# Patient Record
Sex: MALE | Race: WHITE | Employment: OTHER | URBAN - METROPOLITAN AREA
[De-identification: names, ages, dates, MRNs, and addresses within clinical notes are randomized per-mention and may not be internally consistent; named-entity substitution may affect disease eponyms.]

---

## 2017-01-05 RX ORDER — PRAVASTATIN SODIUM 40 MG
TABLET ORAL
Qty: 90 TABLET | Refills: 2 | Status: SHIPPED | OUTPATIENT
Start: 2017-01-05 | End: 2017-06-16 | Stop reason: SDUPTHER

## 2017-01-05 RX ORDER — LISINOPRIL AND HYDROCHLOROTHIAZIDE 25; 20 MG/1; MG/1
TABLET ORAL
Qty: 90 TABLET | Refills: 0 | Status: SHIPPED | OUTPATIENT
Start: 2017-01-05 | End: 2017-04-15 | Stop reason: SDUPTHER

## 2017-04-13 ENCOUNTER — OFFICE VISIT (OUTPATIENT)
Dept: INTERNAL MEDICINE CLINIC | Age: 82
End: 2017-04-13

## 2017-04-13 VITALS — DIASTOLIC BLOOD PRESSURE: 60 MMHG | HEIGHT: 68 IN | SYSTOLIC BLOOD PRESSURE: 130 MMHG

## 2017-04-13 DIAGNOSIS — R41.3 MEMORY CHANGE: ICD-10-CM

## 2017-04-13 DIAGNOSIS — H61.23 IMPACTED CERUMEN OF BOTH EARS: Primary | ICD-10-CM

## 2017-04-13 PROCEDURE — 99212 OFFICE O/P EST SF 10 MIN: CPT | Performed by: NURSE PRACTITIONER

## 2017-04-17 RX ORDER — LISINOPRIL AND HYDROCHLOROTHIAZIDE 25; 20 MG/1; MG/1
TABLET ORAL
Qty: 90 TABLET | Refills: 0 | Status: SHIPPED | OUTPATIENT
Start: 2017-04-17 | End: 2017-06-16 | Stop reason: SDUPTHER

## 2017-04-25 ENCOUNTER — OFFICE VISIT (OUTPATIENT)
Dept: INTERNAL MEDICINE CLINIC | Age: 82
End: 2017-04-25

## 2017-04-25 VITALS
DIASTOLIC BLOOD PRESSURE: 70 MMHG | BODY MASS INDEX: 23.64 KG/M2 | HEIGHT: 68 IN | SYSTOLIC BLOOD PRESSURE: 130 MMHG | WEIGHT: 156 LBS

## 2017-04-25 DIAGNOSIS — E11.9 TYPE 2 DIABETES MELLITUS WITHOUT COMPLICATION, WITHOUT LONG-TERM CURRENT USE OF INSULIN (HCC): ICD-10-CM

## 2017-04-25 DIAGNOSIS — R41.3 MEMORY CHANGES: Primary | ICD-10-CM

## 2017-04-25 DIAGNOSIS — I10 ESSENTIAL HYPERTENSION: ICD-10-CM

## 2017-04-25 DIAGNOSIS — H61.21 IMPACTED CERUMEN OF RIGHT EAR: ICD-10-CM

## 2017-04-25 PROCEDURE — 99214 OFFICE O/P EST MOD 30 MIN: CPT | Performed by: NURSE PRACTITIONER

## 2017-04-25 ASSESSMENT — ENCOUNTER SYMPTOMS
CONSTIPATION: 0
COUGH: 0
BACK PAIN: 0
COLOR CHANGE: 0
DIARRHEA: 0
SHORTNESS OF BREATH: 0
BLOOD IN STOOL: 0
EYES NEGATIVE: 1

## 2017-05-31 ENCOUNTER — HOSPITAL ENCOUNTER (OUTPATIENT)
Dept: GENERAL RADIOLOGY | Age: 82
Discharge: HOME | End: 2017-05-31
Attending: PSYCHIATRY & NEUROLOGY | Admitting: PSYCHIATRY & NEUROLOGY

## 2017-05-31 LAB
ANTI-THYROGLOB ABS: 12 IU/ML
FOLATE: >20 NG/ML (ref 4.78–24.2)
HOMOCYSTEINE: 8 UMOL/L (ref 0–10)
T4 TOTAL: 7.5 UG/DL (ref 4.5–10.9)
TSH SERPL DL<=0.05 MIU/L-ACNC: 1.12 UIU/ML (ref 0.27–4.2)

## 2017-06-01 LAB
RPR: NORMAL
THYROID PEROXIDASE (TPO) ABS: 13 IU/ML

## 2017-06-01 RX ORDER — ALENDRONATE SODIUM 70 MG/1
TABLET ORAL
Qty: 4 TABLET | Refills: 9 | Status: SHIPPED | OUTPATIENT
Start: 2017-06-01 | End: 2018-03-27 | Stop reason: SDUPTHER

## 2017-06-08 ENCOUNTER — HOSPITAL ENCOUNTER (OUTPATIENT)
Dept: NEUROLOGY | Age: 82
Discharge: HOME | End: 2017-06-08
Attending: PSYCHIATRY & NEUROLOGY | Admitting: PSYCHIATRY & NEUROLOGY

## 2017-06-08 DIAGNOSIS — R41.3 OTHER AMNESIA: ICD-10-CM

## 2017-06-08 PROCEDURE — 95816 EEG AWAKE AND DROWSY: CPT | Performed by: PSYCHIATRY & NEUROLOGY

## 2017-06-14 ENCOUNTER — TELEPHONE (OUTPATIENT)
Dept: INTERNAL MEDICINE CLINIC | Age: 82
End: 2017-06-14

## 2017-06-16 ENCOUNTER — HOSPITAL ENCOUNTER (OUTPATIENT)
Dept: GENERAL RADIOLOGY | Age: 82
Discharge: HOME | End: 2017-06-16
Attending: NURSE PRACTITIONER | Admitting: NURSE PRACTITIONER

## 2017-06-16 ENCOUNTER — OFFICE VISIT (OUTPATIENT)
Dept: INTERNAL MEDICINE CLINIC | Age: 82
End: 2017-06-16

## 2017-06-16 VITALS
BODY MASS INDEX: 23.72 KG/M2 | WEIGHT: 156 LBS | HEART RATE: 92 BPM | DIASTOLIC BLOOD PRESSURE: 76 MMHG | SYSTOLIC BLOOD PRESSURE: 132 MMHG | OXYGEN SATURATION: 98 %

## 2017-06-16 DIAGNOSIS — I44.7 LBBB (LEFT BUNDLE BRANCH BLOCK): ICD-10-CM

## 2017-06-16 DIAGNOSIS — E11.9 TYPE 2 DIABETES MELLITUS WITHOUT COMPLICATION, WITHOUT LONG-TERM CURRENT USE OF INSULIN (HCC): Primary | ICD-10-CM

## 2017-06-16 DIAGNOSIS — I10 ESSENTIAL HYPERTENSION: ICD-10-CM

## 2017-06-16 DIAGNOSIS — E78.2 MIXED HYPERLIPIDEMIA: ICD-10-CM

## 2017-06-16 DIAGNOSIS — E11.9 TYPE 2 DIABETES MELLITUS WITHOUT COMPLICATION, WITHOUT LONG-TERM CURRENT USE OF INSULIN (HCC): ICD-10-CM

## 2017-06-16 LAB
A/G RATIO: 1.2 (ref 1.1–2.2)
ALBUMIN SERPL-MCNC: 4.4 G/DL (ref 3.4–5)
ALP BLD-CCNC: 62 U/L (ref 40–129)
ALT SERPL-CCNC: 9 U/L (ref 10–40)
ANION GAP SERPL CALCULATED.3IONS-SCNC: 13 MMOL/L (ref 3–16)
AST SERPL-CCNC: 21 U/L (ref 15–37)
BILIRUB SERPL-MCNC: 0.7 MG/DL (ref 0–1)
BUN BLDV-MCNC: 17 MG/DL (ref 7–20)
CALCIUM SERPL-MCNC: 9.5 MG/DL (ref 8.3–10.6)
CHLORIDE BLD-SCNC: 97 MMOL/L (ref 99–110)
CHOLESTEROL, TOTAL: 139 MG/DL (ref 0–199)
CO2: 29 MMOL/L (ref 21–32)
CREAT SERPL-MCNC: 0.6 MG/DL (ref 0.8–1.3)
ESTIMATED AVERAGE GLUCOSE: 157.1 MG/DL
GFR AFRICAN AMERICAN: >60
GFR NON-AFRICAN AMERICAN: >60
GLOBULIN: 3.6 G/DL
GLUCOSE BLD-MCNC: 121 MG/DL (ref 70–99)
HBA1C MFR BLD: 0 %
HBA1C MFR BLD: 7.1 %
HDLC SERPL-MCNC: 59 MG/DL (ref 40–60)
LDL CHOLESTEROL CALCULATED: 67 MG/DL
POTASSIUM SERPL-SCNC: 4.1 MMOL/L (ref 3.5–5.1)
SODIUM BLD-SCNC: 139 MMOL/L (ref 136–145)
TOTAL PROTEIN: 8 G/DL (ref 6.4–8.2)
TRIGL SERPL-MCNC: 66 MG/DL (ref 0–150)
VLDLC SERPL CALC-MCNC: 13 MG/DL

## 2017-06-16 PROCEDURE — 99214 OFFICE O/P EST MOD 30 MIN: CPT | Performed by: NURSE PRACTITIONER

## 2017-06-16 PROCEDURE — 83036 HEMOGLOBIN GLYCOSYLATED A1C: CPT | Performed by: NURSE PRACTITIONER

## 2017-06-16 RX ORDER — PRAVASTATIN SODIUM 40 MG
TABLET ORAL
Qty: 90 TABLET | Refills: 3 | Status: SHIPPED | OUTPATIENT
Start: 2017-06-16 | End: 2018-08-18 | Stop reason: SDUPTHER

## 2017-06-16 RX ORDER — LISINOPRIL AND HYDROCHLOROTHIAZIDE 25; 20 MG/1; MG/1
TABLET ORAL
Qty: 90 TABLET | Refills: 3 | Status: SHIPPED | OUTPATIENT
Start: 2017-06-16 | End: 2018-09-12 | Stop reason: SDUPTHER

## 2017-06-16 ASSESSMENT — ENCOUNTER SYMPTOMS
FACIAL SWELLING: 0
NAUSEA: 0
COUGH: 0
SINUS PRESSURE: 0
VOMITING: 0
SORE THROAT: 0
DIARRHEA: 0

## 2017-06-16 ASSESSMENT — PATIENT HEALTH QUESTIONNAIRE - PHQ9
SUM OF ALL RESPONSES TO PHQ9 QUESTIONS 1 & 2: 2
1. LITTLE INTEREST OR PLEASURE IN DOING THINGS: 1
SUM OF ALL RESPONSES TO PHQ QUESTIONS 1-9: 2
2. FEELING DOWN, DEPRESSED OR HOPELESS: 1

## 2017-07-03 ENCOUNTER — TELEPHONE (OUTPATIENT)
Dept: INTERNAL MEDICINE CLINIC | Age: 82
End: 2017-07-03

## 2017-07-31 ENCOUNTER — TELEPHONE (OUTPATIENT)
Dept: INTERNAL MEDICINE CLINIC | Age: 82
End: 2017-07-31

## 2017-08-01 ENCOUNTER — OFFICE VISIT (OUTPATIENT)
Dept: INTERNAL MEDICINE CLINIC | Age: 82
End: 2017-08-01

## 2017-08-01 VITALS
TEMPERATURE: 98.8 F | WEIGHT: 154 LBS | SYSTOLIC BLOOD PRESSURE: 124 MMHG | HEART RATE: 53 BPM | HEIGHT: 68 IN | DIASTOLIC BLOOD PRESSURE: 62 MMHG | BODY MASS INDEX: 23.34 KG/M2 | OXYGEN SATURATION: 95 %

## 2017-08-01 DIAGNOSIS — J04.0 LARYNGITIS: ICD-10-CM

## 2017-08-01 DIAGNOSIS — J06.9 UPPER RESPIRATORY TRACT INFECTION, UNSPECIFIED TYPE: Primary | ICD-10-CM

## 2017-08-01 PROCEDURE — 99213 OFFICE O/P EST LOW 20 MIN: CPT | Performed by: INTERNAL MEDICINE

## 2017-08-01 PROCEDURE — 4010F ACE/ARB THERAPY RXD/TAKEN: CPT | Performed by: INTERNAL MEDICINE

## 2017-10-04 ENCOUNTER — HOSPITAL ENCOUNTER (OUTPATIENT)
Dept: GENERAL RADIOLOGY | Age: 82
Discharge: HOME | End: 2017-10-04
Attending: PSYCHIATRY & NEUROLOGY | Admitting: PSYCHIATRY & NEUROLOGY

## 2017-10-04 LAB — VITAMIN B-12: 629 PG/ML (ref 211–911)

## 2017-10-05 ENCOUNTER — TELEPHONE (OUTPATIENT)
Dept: INTERNAL MEDICINE CLINIC | Age: 82
End: 2017-10-05

## 2017-11-08 ENCOUNTER — OFFICE VISIT (OUTPATIENT)
Dept: INTERNAL MEDICINE CLINIC | Age: 82
End: 2017-11-08

## 2017-11-08 ENCOUNTER — HOSPITAL ENCOUNTER (OUTPATIENT)
Dept: CT IMAGING | Age: 82
Discharge: HOME | End: 2017-11-08
Attending: FAMILY MEDICINE | Admitting: FAMILY MEDICINE

## 2017-11-08 ENCOUNTER — TELEPHONE (OUTPATIENT)
Dept: INTERNAL MEDICINE CLINIC | Age: 82
End: 2017-11-08

## 2017-11-08 VITALS
SYSTOLIC BLOOD PRESSURE: 138 MMHG | DIASTOLIC BLOOD PRESSURE: 82 MMHG | WEIGHT: 145 LBS | OXYGEN SATURATION: 96 % | HEART RATE: 62 BPM | BODY MASS INDEX: 22.05 KG/M2

## 2017-11-08 DIAGNOSIS — R59.0 LOCALIZED ENLARGED LYMPH NODES: ICD-10-CM

## 2017-11-08 DIAGNOSIS — R59.0 LYMPHADENOPATHY OF RIGHT CERVICAL REGION: ICD-10-CM

## 2017-11-08 DIAGNOSIS — E11.9 TYPE 2 DIABETES MELLITUS WITHOUT COMPLICATION, WITHOUT LONG-TERM CURRENT USE OF INSULIN (HCC): ICD-10-CM

## 2017-11-08 DIAGNOSIS — Z12.5 SCREENING FOR PROSTATE CANCER: Primary | ICD-10-CM

## 2017-11-08 DIAGNOSIS — R59.0 LYMPHADENOPATHY OF RIGHT CERVICAL REGION: Primary | ICD-10-CM

## 2017-11-08 DIAGNOSIS — R63.4 WEIGHT LOSS: ICD-10-CM

## 2017-11-08 LAB
A/G RATIO: 1.4 (ref 1.1–2.2)
ALBUMIN SERPL-MCNC: 4.2 G/DL (ref 3.4–5)
ALP BLD-CCNC: 60 U/L (ref 40–129)
ALT SERPL-CCNC: 17 U/L (ref 10–40)
ANION GAP SERPL CALCULATED.3IONS-SCNC: 12 MMOL/L (ref 3–16)
AST SERPL-CCNC: 19 U/L (ref 15–37)
BASOPHILS ABSOLUTE: 0 K/UL (ref 0–0.2)
BASOPHILS RELATIVE PERCENT: 0.8 %
BILIRUB SERPL-MCNC: 0.6 MG/DL (ref 0–1)
BUN BLDV-MCNC: 28 MG/DL (ref 7–20)
CALCIUM SERPL-MCNC: 9.9 MG/DL (ref 8.3–10.6)
CHLORIDE BLD-SCNC: 100 MMOL/L (ref 99–110)
CO2: 31 MMOL/L (ref 21–32)
CREAT SERPL-MCNC: 0.6 MG/DL (ref 0.8–1.3)
CREAT SERPL-MCNC: 0.7 MG/DL (ref 0.8–1.3)
EOSINOPHILS ABSOLUTE: 0 K/UL (ref 0–0.6)
EOSINOPHILS RELATIVE PERCENT: 0.9 %
GFR AFRICAN AMERICAN: >60
GFR AFRICAN AMERICAN: >60
GFR NON-AFRICAN AMERICAN: >60
GFR NON-AFRICAN AMERICAN: >60
GLOBULIN: 3.1 G/DL
GLUCOSE BLD-MCNC: 108 MG/DL (ref 70–99)
HCT VFR BLD CALC: 40.9 % (ref 40.5–52.5)
HEMOGLOBIN: 13.7 G/DL (ref 13.5–17.5)
LYMPHOCYTES ABSOLUTE: 0.8 K/UL (ref 1–5.1)
LYMPHOCYTES RELATIVE PERCENT: 21.6 %
MCH RBC QN AUTO: 31.9 PG (ref 26–34)
MCHC RBC AUTO-ENTMCNC: 33.4 G/DL (ref 31–36)
MCV RBC AUTO: 95.5 FL (ref 80–100)
MONOCYTES ABSOLUTE: 0.3 K/UL (ref 0–1.3)
MONOCYTES RELATIVE PERCENT: 9.1 %
NEUTROPHILS ABSOLUTE: 2.6 K/UL (ref 1.7–7.7)
NEUTROPHILS RELATIVE PERCENT: 67.6 %
PDW BLD-RTO: 13.7 % (ref 12.4–15.4)
PLATELET # BLD: 171 K/UL (ref 135–450)
PMV BLD AUTO: 10.8 FL (ref 5–10.5)
POTASSIUM SERPL-SCNC: 5 MMOL/L (ref 3.5–5.1)
RBC # BLD: 4.28 M/UL (ref 4.2–5.9)
SODIUM BLD-SCNC: 143 MMOL/L (ref 136–145)
TOTAL PROTEIN: 7.3 G/DL (ref 6.4–8.2)
WBC # BLD: 3.8 K/UL (ref 4–11)

## 2017-11-08 PROCEDURE — 1036F TOBACCO NON-USER: CPT | Performed by: FAMILY MEDICINE

## 2017-11-08 PROCEDURE — 4040F PNEUMOC VAC/ADMIN/RCVD: CPT | Performed by: FAMILY MEDICINE

## 2017-11-08 PROCEDURE — G8420 CALC BMI NORM PARAMETERS: HCPCS | Performed by: FAMILY MEDICINE

## 2017-11-08 PROCEDURE — 1123F ACP DISCUSS/DSCN MKR DOCD: CPT | Performed by: FAMILY MEDICINE

## 2017-11-08 PROCEDURE — G8427 DOCREV CUR MEDS BY ELIG CLIN: HCPCS | Performed by: FAMILY MEDICINE

## 2017-11-08 PROCEDURE — 99214 OFFICE O/P EST MOD 30 MIN: CPT | Performed by: FAMILY MEDICINE

## 2017-11-08 PROCEDURE — G8484 FLU IMMUNIZE NO ADMIN: HCPCS | Performed by: FAMILY MEDICINE

## 2017-11-08 ASSESSMENT — ENCOUNTER SYMPTOMS
STRIDOR: 0
TROUBLE SWALLOWING: 0
BACK PAIN: 0
SORE THROAT: 0
COUGH: 0
NAUSEA: 0
SHORTNESS OF BREATH: 0
ABDOMINAL PAIN: 0
DIARRHEA: 0
VOMITING: 0

## 2017-11-08 NOTE — TELEPHONE ENCOUNTER
I will call Pt tomorrow when all results are available    Neck CT showed reactive lymphadenopathy, ?  Reactive right submandibular gland, nonspecific sclerotic lesions in C7, T1 and T2

## 2017-11-08 NOTE — PROGRESS NOTES
Neck CT showed reactive lymphadenopathy, ?  Reactive right submandibular gland, nonspecific sclerotic lesions in C7, T1 and T2

## 2017-11-09 DIAGNOSIS — Z12.5 SCREENING FOR PROSTATE CANCER: ICD-10-CM

## 2017-11-09 LAB
ESTIMATED AVERAGE GLUCOSE: 159.9 MG/DL
HBA1C MFR BLD: 7.2 %
PROSTATE SPECIFIC ANTIGEN: 1.76 NG/ML (ref 0–4)

## 2017-11-09 NOTE — PROGRESS NOTES
CBC stable, no anemia   CMP ok except BUN slightly up - rec drink more water  A1c stable and acceptable at 7. 2
normal.   Left Ear: External ear normal.   Nose: Nose normal.   Edentulous on top, dentures in place, poor dentition on the bottom, no oral masses   Eyes: Conjunctivae are normal.   Cardiovascular: Normal rate, regular rhythm and normal heart sounds. Pulmonary/Chest: Effort normal and breath sounds normal.   Abdominal: Soft. Bowel sounds are normal.   Musculoskeletal:   Kyphotic spine   Lymphadenopathy:     He has cervical adenopathy (Palpable, mildly tender mass along superior anterior cervical chain). Right axillary: No pectoral and no lateral adenopathy present. Left axillary: No pectoral and no lateral adenopathy present. Right: No inguinal and no supraclavicular adenopathy present. Left: Inguinal adenopathy present. No supraclavicular adenopathy present. Neurological: He is alert and oriented to person, place, and time. Skin: He is not diaphoretic. Psychiatric: He has a normal mood and affect. His behavior is normal. Thought content normal.   Vitals reviewed. Vitals:    11/08/17 1152   BP: 138/82   Site: Right Arm   Position: Sitting   Cuff Size: Medium Adult   Pulse: 62   SpO2: 96%   Weight: 145 lb (65.8 kg)     Body mass index is 22.05 kg/m². Assessment/Plan:    1. Lymphadenopathy of right cervical region  2. Weight loss  -Concern for lymphadenopathy, possible lymphoma given lack of infectious symptoms and weight loss  Sent patient for CT scan of his neck  Will also check bloodwork    - Comprehensive Metabolic Panel; Future  - CBC Auto Differential; Future  - CREATININE, SERUM; Future  - CT Soft Tissue Neck W Contrast; Future        3. Type 2 diabetes mellitus without complication, without long-term current use of insulin (HCC)  Diet controlled  Will check hemoglobin A1c with blood work, as he will be due for that soon  - Hemoglobin A1C      Return if symptoms worsen or fail to improve.

## 2017-11-09 NOTE — TELEPHONE ENCOUNTER
I called Pt back to discuss CT and lab results:    CBC stable, no anemia   CMP ok except BUN slightly up - rec drink more water  A1c stable and acceptable at 7.2    He does not c/o neck pain. He cannot recall if he has had problems with his prostate in the past. I rec adding on a PSA level to yesterday's bloodwork, as this could be a possible primary CA if bone lesions are metastatic. We discussed considering further workup with a bone scan. He would like to discuss this with his daughters and let us know. I offered to call one of his children as well to discuss, but he declined. He has a daughter in town with him right now and will discuss it. He has a follow-up appointment with his PCP Yuan next month. I told him I would let her know about these results as well.

## 2017-12-18 ENCOUNTER — OFFICE VISIT (OUTPATIENT)
Dept: INTERNAL MEDICINE CLINIC | Age: 82
End: 2017-12-18

## 2017-12-18 ENCOUNTER — TELEPHONE (OUTPATIENT)
Dept: INTERNAL MEDICINE CLINIC | Age: 82
End: 2017-12-18

## 2017-12-18 VITALS
WEIGHT: 145 LBS | DIASTOLIC BLOOD PRESSURE: 84 MMHG | BODY MASS INDEX: 22.05 KG/M2 | OXYGEN SATURATION: 97 % | HEART RATE: 81 BPM | SYSTOLIC BLOOD PRESSURE: 136 MMHG

## 2017-12-18 DIAGNOSIS — I10 ESSENTIAL HYPERTENSION: ICD-10-CM

## 2017-12-18 DIAGNOSIS — E78.2 MIXED HYPERLIPIDEMIA: ICD-10-CM

## 2017-12-18 DIAGNOSIS — R93.89 ABNORMAL CT SCAN, NECK: ICD-10-CM

## 2017-12-18 DIAGNOSIS — E11.9 TYPE 2 DIABETES MELLITUS WITHOUT COMPLICATION, WITHOUT LONG-TERM CURRENT USE OF INSULIN (HCC): Primary | ICD-10-CM

## 2017-12-18 DIAGNOSIS — R41.3 MEMORY LOSS: ICD-10-CM

## 2017-12-18 DIAGNOSIS — N88.9 CERVICAL LESION: ICD-10-CM

## 2017-12-18 DIAGNOSIS — R35.0 URINARY FREQUENCY: ICD-10-CM

## 2017-12-18 DIAGNOSIS — Z23 NEED FOR INFLUENZA VACCINATION: ICD-10-CM

## 2017-12-18 DIAGNOSIS — I49.9 CARDIAC ARRHYTHMIA, UNSPECIFIED CARDIAC ARRHYTHMIA TYPE: ICD-10-CM

## 2017-12-18 PROCEDURE — G8427 DOCREV CUR MEDS BY ELIG CLIN: HCPCS | Performed by: NURSE PRACTITIONER

## 2017-12-18 PROCEDURE — 1123F ACP DISCUSS/DSCN MKR DOCD: CPT | Performed by: NURSE PRACTITIONER

## 2017-12-18 PROCEDURE — G8420 CALC BMI NORM PARAMETERS: HCPCS | Performed by: NURSE PRACTITIONER

## 2017-12-18 PROCEDURE — G8484 FLU IMMUNIZE NO ADMIN: HCPCS | Performed by: NURSE PRACTITIONER

## 2017-12-18 PROCEDURE — 99214 OFFICE O/P EST MOD 30 MIN: CPT | Performed by: NURSE PRACTITIONER

## 2017-12-18 PROCEDURE — 81002 URINALYSIS NONAUTO W/O SCOPE: CPT | Performed by: NURSE PRACTITIONER

## 2017-12-18 PROCEDURE — 4040F PNEUMOC VAC/ADMIN/RCVD: CPT | Performed by: NURSE PRACTITIONER

## 2017-12-18 PROCEDURE — 1036F TOBACCO NON-USER: CPT | Performed by: NURSE PRACTITIONER

## 2017-12-18 NOTE — PROGRESS NOTES
Concern    Not on file     Social History Narrative    No narrative on file     Family History   Problem Relation Age of Onset    Cancer Mother     Diabetes Mother     Alzheimer's Disease Father          Review of Systems   Constitutional: Positive for fatigue. Negative for appetite change, chills, fever and unexpected weight change. HENT: Negative for congestion, ear discharge, ear pain, facial swelling, hearing loss, sinus pressure, sneezing and sore throat. Respiratory: Negative for cough. Cardiovascular: Negative for chest pain. Gastrointestinal: Positive for constipation. Negative for diarrhea, nausea and vomiting. Genitourinary: Positive for frequency. Negative for difficulty urinating, dysuria, hematuria and urgency. Musculoskeletal: Negative for arthralgias and gait problem. Neurological: Negative for dizziness, weakness and headaches. Memory changes   Hematological: Negative for adenopathy. Psychiatric/Behavioral: Negative for sleep disturbance and suicidal ideas. Objective:   Physical Exam   Constitutional: He is oriented to person, place, and time. He appears well-developed and well-nourished. No distress. Thin   HENT:   Head: Normocephalic and atraumatic. Nose: Nose normal.   Mouth/Throat: Oropharynx is clear and moist.   Full dentures   Eyes: Conjunctivae and EOM are normal. Pupils are equal, round, and reactive to light. Neck:   Superior anterior cervical chain with mild tender lymphadenopathy   Cardiovascular: Normal rate, regular rhythm, normal heart sounds and intact distal pulses. Pulmonary/Chest: Effort normal and breath sounds normal. He has no wheezes. He has no rales. Abdominal: Soft. Bowel sounds are normal. He exhibits no mass. There is no tenderness. There is no rebound and no guarding. Musculoskeletal: He exhibits no edema. Kyphotic spine   Neurological: He is alert and oriented to person, place, and time. No cranial nerve deficit. Ambulation with walker    Skin: Skin is warm and dry. He is not diaphoretic. Psychiatric: He has a normal mood and affect. His behavior is normal. Thought content normal.       Assessment:      1. Type 2 diabetes mellitus without complication, without long-term current use of insulin (HCC)  Stable    2. Essential hypertension  Monitor, cont medication    3. Mixed hyperlipidemia  Monitor, cont medication    4. Cardiac arrhythmia, unspecified cardiac arrhythmia type  Monitor    5. Urinary frequency  Unable to leave urine sample will send home with cup    - POCT Urinalysis no Micro    6. Need for influenza vaccination  Pt tolerated well  - INFLUENZA, HIGH DOSE, 65 YRS +, IM, PF, PREFILL SYR, 0.5ML (FLUZONE HD)    7. Cervical lesion  Reviewed abnormal scan with pt and son-in-law. Pt will consider bone scan. - NM Bone Scan Whole Body; Future    8. Abnormal CT scan, neck  Reviewed abnormal scan with patient, will consider bone scan. - NM Bone Scan Whole Body; Future    9. Memory loss  Will consider Aricept          Plan:      See above plan    Consider doxazosin pending UA results    Return in about 6 months (around 6/18/2018) for 30 min appt, HTN, Hyperlipidemia.

## 2017-12-18 NOTE — PATIENT INSTRUCTIONS
Consider Aricept for memory. Consider Doxazosin for frequent urination. Consider Colace for softener of stool.     Take aspirin daily 81mg

## 2017-12-18 NOTE — TELEPHONE ENCOUNTER
Patient had appt today and was told to bring in a Urine sample. .. He was also suppose to get a flu shot and didn't. Please call back and tell him if he can get the flu shot when he brings in the urine sample.   [phone]

## 2017-12-19 LAB
BILIRUBIN, POC: NORMAL
BLOOD URINE, POC: NORMAL
CLARITY, POC: CLEAR
COLOR, POC: YELLOW
GLUCOSE URINE, POC: NORMAL
KETONES, POC: NORMAL
LEUKOCYTE EST, POC: NORMAL
NITRITE, POC: NORMAL
PH, POC: 6
PROTEIN, POC: NORMAL
SPECIFIC GRAVITY, POC: 0.2
UROBILINOGEN, POC: 1.02

## 2017-12-19 PROCEDURE — G0008 ADMIN INFLUENZA VIRUS VAC: HCPCS | Performed by: NURSE PRACTITIONER

## 2017-12-19 PROCEDURE — 90662 IIV NO PRSV INCREASED AG IM: CPT | Performed by: NURSE PRACTITIONER

## 2017-12-19 NOTE — PROGRESS NOTES
Vaccine Information Sheet, \"Influenza - Inactivated\"  given to Lisa Shelton, or parent/legal guardian of  Lisa Shelton and verbalized understanding. Patient responses:    Have you ever had a reaction to a flu vaccine? No  Are you able to eat eggs without adverse effects? Yes  Do you have any current illness? No  Have you ever had Guillian Midkiff Syndrome? No    Flu vaccine given per order. Please see immunization tab.

## 2017-12-20 ASSESSMENT — ENCOUNTER SYMPTOMS
SINUS PRESSURE: 0
NAUSEA: 0
CONSTIPATION: 1
COUGH: 0
VOMITING: 0
DIARRHEA: 0
FACIAL SWELLING: 0
SORE THROAT: 0

## 2018-02-28 ENCOUNTER — TELEPHONE (OUTPATIENT)
Dept: INTERNAL MEDICINE CLINIC | Age: 83
End: 2018-02-28

## 2018-02-28 NOTE — TELEPHONE ENCOUNTER
Son-in-law, Gricel Bray, calling to ask the name of the surgeon that may have taken a growth off of Edrachael's buttocks. Maday Harris doesn't seem to remember who did this surgery a while ago and is now worried that this growth is returning. I don't see a name of a surgeon in the chart that would have done this, but hoping you may be aware of the situation. Call Gricel Bray with the name at [phone].

## 2018-06-22 ENCOUNTER — TELEPHONE (OUTPATIENT)
Dept: INTERNAL MEDICINE CLINIC | Age: 83
End: 2018-06-22

## 2018-06-22 ENCOUNTER — OFFICE VISIT (OUTPATIENT)
Dept: INTERNAL MEDICINE CLINIC | Age: 83
End: 2018-06-22

## 2018-06-22 VITALS — BODY MASS INDEX: 22.05 KG/M2 | SYSTOLIC BLOOD PRESSURE: 138 MMHG | DIASTOLIC BLOOD PRESSURE: 80 MMHG | WEIGHT: 145 LBS

## 2018-06-22 DIAGNOSIS — R41.3 MEMORY LOSS: ICD-10-CM

## 2018-06-22 DIAGNOSIS — N40.1 BENIGN PROSTATIC HYPERPLASIA WITH URINARY FREQUENCY: ICD-10-CM

## 2018-06-22 DIAGNOSIS — K59.00 CONSTIPATION, UNSPECIFIED CONSTIPATION TYPE: ICD-10-CM

## 2018-06-22 DIAGNOSIS — E78.2 MIXED HYPERLIPIDEMIA: ICD-10-CM

## 2018-06-22 DIAGNOSIS — R35.0 BENIGN PROSTATIC HYPERPLASIA WITH URINARY FREQUENCY: ICD-10-CM

## 2018-06-22 DIAGNOSIS — E11.9 TYPE 2 DIABETES MELLITUS WITHOUT COMPLICATION, WITHOUT LONG-TERM CURRENT USE OF INSULIN (HCC): Primary | ICD-10-CM

## 2018-06-22 DIAGNOSIS — I49.9 CARDIAC ARRHYTHMIA, UNSPECIFIED CARDIAC ARRHYTHMIA TYPE: ICD-10-CM

## 2018-06-22 DIAGNOSIS — I10 ESSENTIAL HYPERTENSION: ICD-10-CM

## 2018-06-22 DIAGNOSIS — R93.89 ABNORMAL CT SCAN, NECK: ICD-10-CM

## 2018-06-22 PROCEDURE — 4040F PNEUMOC VAC/ADMIN/RCVD: CPT | Performed by: NURSE PRACTITIONER

## 2018-06-22 PROCEDURE — 1036F TOBACCO NON-USER: CPT | Performed by: NURSE PRACTITIONER

## 2018-06-22 PROCEDURE — 3288F FALL RISK ASSESSMENT DOCD: CPT | Performed by: NURSE PRACTITIONER

## 2018-06-22 PROCEDURE — 1123F ACP DISCUSS/DSCN MKR DOCD: CPT | Performed by: NURSE PRACTITIONER

## 2018-06-22 PROCEDURE — G8420 CALC BMI NORM PARAMETERS: HCPCS | Performed by: NURSE PRACTITIONER

## 2018-06-22 PROCEDURE — G8510 SCR DEP NEG, NO PLAN REQD: HCPCS | Performed by: NURSE PRACTITIONER

## 2018-06-22 PROCEDURE — 99214 OFFICE O/P EST MOD 30 MIN: CPT | Performed by: NURSE PRACTITIONER

## 2018-06-22 PROCEDURE — G8427 DOCREV CUR MEDS BY ELIG CLIN: HCPCS | Performed by: NURSE PRACTITIONER

## 2018-06-22 ASSESSMENT — PATIENT HEALTH QUESTIONNAIRE - PHQ9
SUM OF ALL RESPONSES TO PHQ QUESTIONS 1-9: 0
2. FEELING DOWN, DEPRESSED OR HOPELESS: 0
1. LITTLE INTEREST OR PLEASURE IN DOING THINGS: 0
SUM OF ALL RESPONSES TO PHQ9 QUESTIONS 1 & 2: 0

## 2018-06-22 ASSESSMENT — ENCOUNTER SYMPTOMS
VOMITING: 0
SORE THROAT: 0
COUGH: 0
CONSTIPATION: 1
SINUS PRESSURE: 0
DIARRHEA: 0
FACIAL SWELLING: 0
NAUSEA: 0

## 2018-06-25 ENCOUNTER — TELEPHONE (OUTPATIENT)
Dept: INTERNAL MEDICINE CLINIC | Age: 83
End: 2018-06-25

## 2018-06-29 NOTE — TELEPHONE ENCOUNTER
Please call Misty, Patient's daughter, [phone]. They have concerns and I have concerns for patient's safety at home. Daugher would like some help on community resources. Can you call her to help this family?

## 2018-07-02 ENCOUNTER — CARE COORDINATION (OUTPATIENT)
Dept: CARE COORDINATION | Age: 83
End: 2018-07-02

## 2018-07-02 NOTE — LETTER
7/3/2018    [address] [place] [address] [id number]      Dear Efrain Chambers,    My name is Tori Tony and I am a registered nurse who partners with TEMITOPE Hicks CNP to improve patients' health. TEMITOPE Hicks CNP believes you would benefit from working with me. As a member of your health care team, I would work with other providers involved in your care, offer education for your specific health conditions, and connect you with additional resources as needed. I will collaborate with TEMITOPE Hicks CNP to support you in following your treatment plan. The additional support I provide is no additional cost to you. My primary focus is to help you achieve specific goals and improve your health. We are committed to walk with you on this journey and look forward to working with you. Please call me to further discuss your healthcare needs. I am available by phone or for appointments at the office. You can reach me at [phone].     In good health,     Tori Tony RN

## 2018-07-03 NOTE — CARE COORDINATION
medicaitons)  Barriers to medication adherence:  None  Are you able to afford your medications?:  Yes  How often do you have trouble taking your medications the way you have been told to take them?:  I always take them as prescribed. Do you have Home O2 Therapy?:  No      Ability to seek help/take action for Emergent Urgent situations i.e. fire, crime, inclement weather or health crisis. :  Independent  Ability to ambulate to restroom:  Independent  Ability handle personal hygeine needs (bathing/dressing/grooming): Independent  Ability to manage Medications: Independent  Ability to prepare Food Preparation:  Independent  Ability to maintain home (clean home, laundry):  Needs Assistance  Ability to drive and/or has transportation:  Independent  Ability to do shopping:  Independent  Ability to manage finances: Independent  Is patient able to live independently?:  Yes     Current Housing:  Private Residence        Per the Fall Risk Screening, did the patient have 2 or more falls or 1 fall with injury in the past year?:  No     Frequent urination at night?:  No  Do you use rails/bars?:  Yes  Do you have a non-slip tub mat?:  Yes     Are you experiencing loss of meaning?:  No  Are you experiencing loss of hope and peace?:  No     Thinking about your patient's physical health needs, are there any symptoms or problems (risk indicators) you are unsure about that require further investigation?:  Mild vague physical symptoms or problems; but do not impact on daily life or are not of concern to patient   Are the patients physical health problems impacting on their mental well-being?:  No identified areas of concern   Are there any problems with your patients lifestyle behaviors (alcohol, drugs, diet, exercise) that are impacting on physical or mental well-being?:  No identified areas of concern   Do you have any other concerns about your patients mental well-being?  How would you rate their severity and impact on the patient?:  No identified areas of concern   How would you rate their home environment in terms of safety and stability (including domestic violence, insecure housing, neighbor harassment)?:  Safe, stable, but with some inconsistency   How do daily activities impact on the patient's well-being? (include current or anticipated unemployment, work, caregiving, access to transportation or other):  No identified problems or perceived positive benefits   How would you rate their social network (family, work, friends)?:  Adequate participation with social networks   How would you rate their financial resources (including ability to afford all required medical care)?:  Financially secure, resources adequate, no identified problems   How wells does the patient now understand their health and well-being (symptoms, signs or risk factors) and what they need to do to manage their health?:  Reasonable to good understanding and already engages in managing health or is willing to undertake better management   How well do you think your patient can engage in healthcare discussions? (Barriers include language, deafness, aphasia, alcohol or drug problems, learning difficulties, concentration):  Clear and open communication, no identified barriers   Do other services need to be involved to help this patient?:  Other care/services in place but not sufficient   Are current services involved with this patient well-coordinated? (Include coordination with other services you are now recommendation):  Required care/services in place with some coordination barriers   Suggested Interventions and Community Resources                  Prior to Admission medications    Medication Sig Start Date End Date Taking?  Authorizing Provider   alendronate (FOSAMAX) 70 MG tablet TAKE ONE TABLET BY MOUTH ONCE A WEEK 3/27/18 6/25/18  TEMITOPE Bocanegra - CNP   carbidopa-levodopa (SINEMET)  MG per tablet  5/31/17   Historical Provider, MD

## 2018-08-18 DIAGNOSIS — E78.2 MIXED HYPERLIPIDEMIA: ICD-10-CM

## 2018-08-20 RX ORDER — PRAVASTATIN SODIUM 40 MG
TABLET ORAL
Qty: 90 TABLET | Refills: 3 | Status: SHIPPED | OUTPATIENT
Start: 2018-08-20 | End: 2019-10-14 | Stop reason: SDUPTHER

## 2018-09-12 DIAGNOSIS — I10 ESSENTIAL HYPERTENSION: ICD-10-CM

## 2018-09-12 RX ORDER — LISINOPRIL AND HYDROCHLOROTHIAZIDE 25; 20 MG/1; MG/1
TABLET ORAL
Qty: 90 TABLET | Refills: 3 | Status: SHIPPED | OUTPATIENT
Start: 2018-09-12 | End: 2019-11-01 | Stop reason: SDUPTHER

## 2018-11-09 ENCOUNTER — OFFICE VISIT (OUTPATIENT)
Dept: INTERNAL MEDICINE CLINIC | Age: 83
End: 2018-11-09
Payer: MEDICARE

## 2018-11-09 ENCOUNTER — HOSPITAL ENCOUNTER (OUTPATIENT)
Age: 83
Discharge: HOME | End: 2018-11-09
Payer: MEDICARE

## 2018-11-09 ENCOUNTER — TELEPHONE (OUTPATIENT)
Dept: INTERNAL MEDICINE CLINIC | Age: 83
End: 2018-11-09

## 2018-11-09 ENCOUNTER — HOSPITAL ENCOUNTER (OUTPATIENT)
Dept: GENERAL RADIOLOGY | Age: 83
Discharge: HOME | End: 2018-11-09
Payer: MEDICARE

## 2018-11-09 VITALS
OXYGEN SATURATION: 92 % | WEIGHT: 141.6 LBS | BODY MASS INDEX: 23.59 KG/M2 | TEMPERATURE: 97.3 F | HEART RATE: 69 BPM | HEIGHT: 65 IN | DIASTOLIC BLOOD PRESSURE: 74 MMHG | SYSTOLIC BLOOD PRESSURE: 132 MMHG

## 2018-11-09 DIAGNOSIS — M54.2 NECK PAIN: Primary | ICD-10-CM

## 2018-11-09 DIAGNOSIS — R93.89 ABNORMAL CT SCAN: ICD-10-CM

## 2018-11-09 DIAGNOSIS — M54.2 NECK PAIN: ICD-10-CM

## 2018-11-09 DIAGNOSIS — Z23 NEED FOR IMMUNIZATION AGAINST INFLUENZA: ICD-10-CM

## 2018-11-09 PROCEDURE — G0008 ADMIN INFLUENZA VIRUS VAC: HCPCS | Performed by: NURSE PRACTITIONER

## 2018-11-09 PROCEDURE — 1101F PT FALLS ASSESS-DOCD LE1/YR: CPT | Performed by: NURSE PRACTITIONER

## 2018-11-09 PROCEDURE — G8482 FLU IMMUNIZE ORDER/ADMIN: HCPCS | Performed by: NURSE PRACTITIONER

## 2018-11-09 PROCEDURE — 1036F TOBACCO NON-USER: CPT | Performed by: NURSE PRACTITIONER

## 2018-11-09 PROCEDURE — 4040F PNEUMOC VAC/ADMIN/RCVD: CPT | Performed by: NURSE PRACTITIONER

## 2018-11-09 PROCEDURE — G8427 DOCREV CUR MEDS BY ELIG CLIN: HCPCS | Performed by: NURSE PRACTITIONER

## 2018-11-09 PROCEDURE — 72040 X-RAY EXAM NECK SPINE 2-3 VW: CPT

## 2018-11-09 PROCEDURE — 99213 OFFICE O/P EST LOW 20 MIN: CPT | Performed by: NURSE PRACTITIONER

## 2018-11-09 PROCEDURE — 1123F ACP DISCUSS/DSCN MKR DOCD: CPT | Performed by: NURSE PRACTITIONER

## 2018-11-09 PROCEDURE — G8420 CALC BMI NORM PARAMETERS: HCPCS | Performed by: NURSE PRACTITIONER

## 2018-11-09 PROCEDURE — 90662 IIV NO PRSV INCREASED AG IM: CPT | Performed by: NURSE PRACTITIONER

## 2018-11-09 ASSESSMENT — ENCOUNTER SYMPTOMS
SINUS PRESSURE: 0
NAUSEA: 0
FACIAL SWELLING: 0
COUGH: 0
SORE THROAT: 0
VOMITING: 0
DIARRHEA: 0

## 2018-11-12 NOTE — TELEPHONE ENCOUNTER
Please see my note from Friday and call pt and daughter. Also, I think they can call a place like [facility] who can help them get a lift chair. Let me know if they need a prescription.

## 2018-11-16 ENCOUNTER — TELEPHONE (OUTPATIENT)
Dept: INTERNAL MEDICINE CLINIC | Age: 83
End: 2018-11-16

## 2018-12-14 ENCOUNTER — TELEPHONE (OUTPATIENT)
Dept: INTERNAL MEDICINE CLINIC | Age: 83
End: 2018-12-14

## 2018-12-21 ENCOUNTER — HOSPITAL ENCOUNTER (OUTPATIENT)
Age: 83
Discharge: HOME | End: 2018-12-21
Payer: MEDICARE

## 2018-12-21 ENCOUNTER — OFFICE VISIT (OUTPATIENT)
Dept: INTERNAL MEDICINE CLINIC | Age: 83
End: 2018-12-21
Payer: MEDICARE

## 2018-12-21 VITALS — SYSTOLIC BLOOD PRESSURE: 128 MMHG | DIASTOLIC BLOOD PRESSURE: 76 MMHG | WEIGHT: 141 LBS | BODY MASS INDEX: 23.46 KG/M2

## 2018-12-21 DIAGNOSIS — E11.9 TYPE 2 DIABETES MELLITUS WITHOUT COMPLICATION, WITHOUT LONG-TERM CURRENT USE OF INSULIN (HCC): Primary | ICD-10-CM

## 2018-12-21 DIAGNOSIS — I49.9 CARDIAC ARRHYTHMIA, UNSPECIFIED CARDIAC ARRHYTHMIA TYPE: ICD-10-CM

## 2018-12-21 DIAGNOSIS — E78.2 MIXED HYPERLIPIDEMIA: ICD-10-CM

## 2018-12-21 DIAGNOSIS — R35.0 BENIGN PROSTATIC HYPERPLASIA WITH URINARY FREQUENCY: ICD-10-CM

## 2018-12-21 DIAGNOSIS — N40.1 BENIGN PROSTATIC HYPERPLASIA WITH URINARY FREQUENCY: ICD-10-CM

## 2018-12-21 DIAGNOSIS — R93.89 ABNORMAL CT SCAN, NECK: ICD-10-CM

## 2018-12-21 DIAGNOSIS — R41.3 MEMORY LOSS: ICD-10-CM

## 2018-12-21 DIAGNOSIS — I10 ESSENTIAL HYPERTENSION: ICD-10-CM

## 2018-12-21 DIAGNOSIS — G20 PARKINSON DISEASE (HCC): ICD-10-CM

## 2018-12-21 LAB
A/G RATIO: 1.5 (ref 1.1–2.2)
ALBUMIN SERPL-MCNC: 4.3 G/DL (ref 3.4–5)
ALP BLD-CCNC: 64 U/L (ref 40–129)
ALT SERPL-CCNC: 12 U/L (ref 10–40)
ANION GAP SERPL CALCULATED.3IONS-SCNC: 12 MMOL/L (ref 3–16)
AST SERPL-CCNC: 20 U/L (ref 15–37)
BASOPHILS ABSOLUTE: 0 K/UL (ref 0–0.2)
BASOPHILS RELATIVE PERCENT: 0.9 %
BILIRUB SERPL-MCNC: 0.5 MG/DL (ref 0–1)
BUN BLDV-MCNC: 25 MG/DL (ref 7–20)
CALCIUM SERPL-MCNC: 9.3 MG/DL (ref 8.3–10.6)
CHLORIDE BLD-SCNC: 100 MMOL/L (ref 99–110)
CO2: 29 MMOL/L (ref 21–32)
CREAT SERPL-MCNC: 0.6 MG/DL (ref 0.8–1.3)
EOSINOPHILS ABSOLUTE: 0.1 K/UL (ref 0–0.6)
EOSINOPHILS RELATIVE PERCENT: 2.4 %
GFR AFRICAN AMERICAN: >60
GFR NON-AFRICAN AMERICAN: >60
GLOBULIN: 2.9 G/DL
GLUCOSE BLD-MCNC: 122 MG/DL (ref 70–99)
HBA1C MFR BLD: 7.7 %
HCT VFR BLD CALC: 40.1 % (ref 40.5–52.5)
HEMOGLOBIN: 13.5 G/DL (ref 13.5–17.5)
LYMPHOCYTES ABSOLUTE: 0.7 K/UL (ref 1–5.1)
LYMPHOCYTES RELATIVE PERCENT: 24.4 %
MCH RBC QN AUTO: 32.3 PG (ref 26–34)
MCHC RBC AUTO-ENTMCNC: 33.7 G/DL (ref 31–36)
MCV RBC AUTO: 95.9 FL (ref 80–100)
MONOCYTES ABSOLUTE: 0.3 K/UL (ref 0–1.3)
MONOCYTES RELATIVE PERCENT: 10.1 %
NEUTROPHILS ABSOLUTE: 1.7 K/UL (ref 1.7–7.7)
NEUTROPHILS RELATIVE PERCENT: 62.2 %
PDW BLD-RTO: 12.7 % (ref 12.4–15.4)
PLATELET # BLD: 154 K/UL (ref 135–450)
PMV BLD AUTO: 10.5 FL (ref 5–10.5)
POTASSIUM SERPL-SCNC: 4.5 MMOL/L (ref 3.5–5.1)
RBC # BLD: 4.18 M/UL (ref 4.2–5.9)
SODIUM BLD-SCNC: 141 MMOL/L (ref 136–145)
TOTAL PROTEIN: 7.2 G/DL (ref 6.4–8.2)
WBC # BLD: 2.8 K/UL (ref 4–11)

## 2018-12-21 PROCEDURE — 85025 COMPLETE CBC W/AUTO DIFF WBC: CPT

## 2018-12-21 PROCEDURE — 1101F PT FALLS ASSESS-DOCD LE1/YR: CPT | Performed by: NURSE PRACTITIONER

## 2018-12-21 PROCEDURE — 1123F ACP DISCUSS/DSCN MKR DOCD: CPT | Performed by: NURSE PRACTITIONER

## 2018-12-21 PROCEDURE — G8482 FLU IMMUNIZE ORDER/ADMIN: HCPCS | Performed by: NURSE PRACTITIONER

## 2018-12-21 PROCEDURE — 99214 OFFICE O/P EST MOD 30 MIN: CPT | Performed by: NURSE PRACTITIONER

## 2018-12-21 PROCEDURE — 4040F PNEUMOC VAC/ADMIN/RCVD: CPT | Performed by: NURSE PRACTITIONER

## 2018-12-21 PROCEDURE — G8420 CALC BMI NORM PARAMETERS: HCPCS | Performed by: NURSE PRACTITIONER

## 2018-12-21 PROCEDURE — 36415 COLL VENOUS BLD VENIPUNCTURE: CPT

## 2018-12-21 PROCEDURE — 1036F TOBACCO NON-USER: CPT | Performed by: NURSE PRACTITIONER

## 2018-12-21 PROCEDURE — 80053 COMPREHEN METABOLIC PANEL: CPT

## 2018-12-21 PROCEDURE — G8427 DOCREV CUR MEDS BY ELIG CLIN: HCPCS | Performed by: NURSE PRACTITIONER

## 2018-12-21 PROCEDURE — 83036 HEMOGLOBIN GLYCOSYLATED A1C: CPT | Performed by: NURSE PRACTITIONER

## 2018-12-21 ASSESSMENT — ENCOUNTER SYMPTOMS
SINUS PRESSURE: 0
VOMITING: 0
FACIAL SWELLING: 0
SORE THROAT: 0
NAUSEA: 0
DIARRHEA: 0
COUGH: 0

## 2018-12-21 NOTE — PATIENT INSTRUCTIONS
Goal Fasting Blood Sugar: 90-140s. Check fasting blood sugar once a week or if you EVER DO NOT FEEL WELL. Smaller cheerios serving. Limit cookies. Smaller dessert portions. Aleve 2 pills daily ADD Prilosec 1 pill daily to protect stomach.

## 2018-12-21 NOTE — PROGRESS NOTES
Subjective:      Patient ID: Rosalia Wilhelm is a 80 y.o. male. HPI   Chief Complaint   Patient presents with    Diabetes    Neck Pain       Using walker and cane around home and no falls since last OV. No tripping on belongings. Weight is stable from last OV. His daughter is here today and states that each night he now eats dinner with them and sleeps at daughter's home. In morning, they return him to his house where he can spend his day. They confirm his medications that he is taking.      CT neck showed questionable nonspecific sclerotic lesions in posterior aspect of C7, T1 and T2. Could not exclude metastatic disease. Pt at the time refused bone scan but since then he has tried performing bone scan but d/t body stature radiology was unable to perform. Type 2 DM. A1c 7.7. He does not check BS. Denies hypoglycemic episodes. Denies neuropathy. Urinates every 2 hours, chronic. Dyslipidemia. Pravastatin. LBBB/Pacemaker. Dr Anders Pastrana manages q 6 months. 8/2018 last OV. Dr Sam Field. Increased Sinemet 1 1/2 TID. Prior to Visit Medications    Medication Sig Taking? Authorizing Provider   lisinopril-hydrochlorothiazide (PRINZIDE;ZESTORETIC) 20-25 MG per tablet TAKE ONE TABLET BY MOUTH ONCE DAILY Yes Bertrum Lesches, APRN - CNP   pravastatin (PRAVACHOL) 40 MG tablet TAKE ONE TABLET BY MOUTH ONCE DAILY Yes TEMITOPE Green - CNP   alendronate (FOSAMAX) 70 MG tablet TAKE ONE TABLET BY MOUTH ONCE A WEEK Yes Bertrum Lesches, APRN - [place]   carbidopa-levodopa (SINEMET)  MG per tablet  Yes Historical Provider, MD   therapeutic multivitamin-minerals (THERAGRAN-M) tablet Take 1 tablet by mouth daily. Yes Historical Provider, MD     Social History     Social History    Marital status:      Spouse name: N/A    Number of children: N/A    Years of education: N/A     Occupational History    Not on file.      Social History Main Topics    Smoking status: Former Smoker     Types:

## 2019-02-27 ENCOUNTER — TELEPHONE (OUTPATIENT)
Dept: INTERNAL MEDICINE CLINIC | Age: 84
End: 2019-02-27

## 2019-03-08 ENCOUNTER — TELEPHONE (OUTPATIENT)
Dept: INTERNAL MEDICINE CLINIC | Age: 84
End: 2019-03-08

## 2019-03-08 ENCOUNTER — OFFICE VISIT (OUTPATIENT)
Dept: INTERNAL MEDICINE CLINIC | Age: 84
End: 2019-03-08
Payer: MEDICARE

## 2019-03-08 VITALS
BODY MASS INDEX: 22.96 KG/M2 | WEIGHT: 138 LBS | HEART RATE: 97 BPM | OXYGEN SATURATION: 99 % | DIASTOLIC BLOOD PRESSURE: 68 MMHG | SYSTOLIC BLOOD PRESSURE: 120 MMHG

## 2019-03-08 DIAGNOSIS — R35.0 URINARY FREQUENCY: Primary | ICD-10-CM

## 2019-03-08 DIAGNOSIS — E11.9 TYPE 2 DIABETES MELLITUS WITHOUT COMPLICATION, WITHOUT LONG-TERM CURRENT USE OF INSULIN (HCC): ICD-10-CM

## 2019-03-08 DIAGNOSIS — G20 PARKINSON DISEASE (HCC): ICD-10-CM

## 2019-03-08 DIAGNOSIS — F03.B0 MODERATE DEMENTIA WITHOUT BEHAVIORAL DISTURBANCE: ICD-10-CM

## 2019-03-08 DIAGNOSIS — L89.153 PRESSURE INJURY OF SACRAL REGION, STAGE 3 (HCC): ICD-10-CM

## 2019-03-08 LAB
BILIRUBIN, POC: NORMAL
BLOOD URINE, POC: NORMAL
CLARITY, POC: CLEAR
COLOR, POC: YELLOW
GLUCOSE URINE, POC: 100
HBA1C MFR BLD: 7.6 %
KETONES, POC: NORMAL
LEUKOCYTE EST, POC: NORMAL
NITRITE, POC: NORMAL
PH, POC: 6
PROTEIN, POC: NORMAL
SPECIFIC GRAVITY, POC: 0.2
UROBILINOGEN, POC: 1.02

## 2019-03-08 PROCEDURE — 4040F PNEUMOC VAC/ADMIN/RCVD: CPT | Performed by: NURSE PRACTITIONER

## 2019-03-08 PROCEDURE — G8482 FLU IMMUNIZE ORDER/ADMIN: HCPCS | Performed by: NURSE PRACTITIONER

## 2019-03-08 PROCEDURE — G8427 DOCREV CUR MEDS BY ELIG CLIN: HCPCS | Performed by: NURSE PRACTITIONER

## 2019-03-08 PROCEDURE — 81002 URINALYSIS NONAUTO W/O SCOPE: CPT | Performed by: NURSE PRACTITIONER

## 2019-03-08 PROCEDURE — 83036 HEMOGLOBIN GLYCOSYLATED A1C: CPT | Performed by: NURSE PRACTITIONER

## 2019-03-08 PROCEDURE — 99215 OFFICE O/P EST HI 40 MIN: CPT | Performed by: NURSE PRACTITIONER

## 2019-03-08 PROCEDURE — G8420 CALC BMI NORM PARAMETERS: HCPCS | Performed by: NURSE PRACTITIONER

## 2019-03-08 PROCEDURE — 1123F ACP DISCUSS/DSCN MKR DOCD: CPT | Performed by: NURSE PRACTITIONER

## 2019-03-08 PROCEDURE — 1036F TOBACCO NON-USER: CPT | Performed by: NURSE PRACTITIONER

## 2019-03-08 PROCEDURE — 1101F PT FALLS ASSESS-DOCD LE1/YR: CPT | Performed by: NURSE PRACTITIONER

## 2019-03-08 RX ORDER — DONEPEZIL HYDROCHLORIDE 5 MG/1
5 TABLET, FILM COATED ORAL NIGHTLY
Qty: 30 TABLET | Refills: 1 | Status: SHIPPED | OUTPATIENT
Start: 2019-03-08 | End: 2019-04-08 | Stop reason: SDUPTHER

## 2019-03-11 ENCOUNTER — TELEPHONE (OUTPATIENT)
Dept: INTERNAL MEDICINE CLINIC | Age: 84
End: 2019-03-11

## 2019-03-13 ENCOUNTER — TELEPHONE (OUTPATIENT)
Dept: INTERNAL MEDICINE CLINIC | Age: 84
End: 2019-03-13

## 2019-03-13 RX ORDER — GLUCOSAMINE HCL/CHONDROITIN SU 500-400 MG
CAPSULE ORAL
Qty: 300 STRIP | Refills: 1 | Status: SHIPPED | OUTPATIENT
Start: 2019-03-13 | End: 2020-02-21 | Stop reason: SDUPTHER

## 2019-03-13 NOTE — TELEPHONE ENCOUNTER
Son calling back. He has not heard from [facility]. .. I called [facility] and talked to [place], and she informed me that there is an insurance issue and also, home care cannot be provided the dame day. It usually takes 24-48 hours to get approved. I told son that we will have to try and get this taken care of tomorrow. He was OK with it. We tried to call Pinky Ames for advice because this can't be done today as she asked.

## 2019-03-13 NOTE — TELEPHONE ENCOUNTER
This needs to be handled TODAY. He needs wound care regarding pressure ulcer. See my notes if you have questions or call me. Also, look at other call regarding medication directions for patient.

## 2019-03-13 NOTE — TELEPHONE ENCOUNTER
At last office visit, patient s AVS stated he will need a home care nurse for wound care. .. Please call son in law back with more information.   Sarai Garcia at [phone]

## 2019-03-14 ENCOUNTER — TELEPHONE (OUTPATIENT)
Dept: INTERNAL MEDICINE CLINIC | Age: 84
End: 2019-03-14

## 2019-03-14 ASSESSMENT — ENCOUNTER SYMPTOMS
CONSTIPATION: 1
DIARRHEA: 1
SORE THROAT: 0
COUGH: 0
SINUS PRESSURE: 0
NAUSEA: 0
VOMITING: 0
FACIAL SWELLING: 0

## 2019-03-14 NOTE — TELEPHONE ENCOUNTER
I called Yonathan Garcia, patients son, and he told me that someone from home health came out this afternoon and treated the pressure sore. They will return on Monday to treat again.

## 2019-03-14 NOTE — TELEPHONE ENCOUNTER
Alexi Valdesing with [facility] saw patient today for wound care. She used a cushion dressing, and said the wound on the back side was 1/2 cm x 1/2 cm and  Didn't look like a stage 3 to her. She will see him 2x next week and then weekly for wound care.

## 2019-03-14 NOTE — TELEPHONE ENCOUNTER
I spoke with Geraldine Calderon and she told me that she is trying to get this taken care of asap. They are still waiting on insurance clearance, and its being worked on. She said they will probably send someone out yet today or tonight. The manager of this team is JEANMARIE Nicole phone number is [phone]   I'll try to follow up  As the day goes on. ..

## 2019-04-08 ENCOUNTER — HOSPITAL ENCOUNTER (OUTPATIENT)
Age: 84
Discharge: HOME | End: 2019-04-08
Payer: MEDICARE

## 2019-04-08 ENCOUNTER — HOSPITAL ENCOUNTER (OUTPATIENT)
Dept: GENERAL RADIOLOGY | Age: 84
Discharge: HOME | End: 2019-04-08
Payer: MEDICARE

## 2019-04-08 ENCOUNTER — TELEPHONE (OUTPATIENT)
Dept: INTERNAL MEDICINE CLINIC | Age: 84
End: 2019-04-08

## 2019-04-08 ENCOUNTER — OFFICE VISIT (OUTPATIENT)
Dept: INTERNAL MEDICINE CLINIC | Age: 84
End: 2019-04-08
Payer: MEDICARE

## 2019-04-08 VITALS
DIASTOLIC BLOOD PRESSURE: 76 MMHG | OXYGEN SATURATION: 98 % | WEIGHT: 133 LBS | HEART RATE: 85 BPM | SYSTOLIC BLOOD PRESSURE: 132 MMHG | BODY MASS INDEX: 22.13 KG/M2

## 2019-04-08 DIAGNOSIS — R41.89 COGNITIVE IMPAIRMENT: Primary | ICD-10-CM

## 2019-04-08 DIAGNOSIS — M53.3 SACRAL PAIN: ICD-10-CM

## 2019-04-08 DIAGNOSIS — M54.50 LUMBAR PAIN: ICD-10-CM

## 2019-04-08 DIAGNOSIS — D72.819 LEUKOPENIA, UNSPECIFIED TYPE: ICD-10-CM

## 2019-04-08 DIAGNOSIS — E11.9 TYPE 2 DIABETES MELLITUS WITHOUT COMPLICATION, WITHOUT LONG-TERM CURRENT USE OF INSULIN (HCC): ICD-10-CM

## 2019-04-08 DIAGNOSIS — G20 PARKINSON DISEASE (HCC): ICD-10-CM

## 2019-04-08 DIAGNOSIS — R63.4 WEIGHT LOSS: ICD-10-CM

## 2019-04-08 DIAGNOSIS — R93.89 ABNORMAL CT SCAN, NECK: ICD-10-CM

## 2019-04-08 PROCEDURE — 72100 X-RAY EXAM L-S SPINE 2/3 VWS: CPT

## 2019-04-08 PROCEDURE — 4040F PNEUMOC VAC/ADMIN/RCVD: CPT | Performed by: NURSE PRACTITIONER

## 2019-04-08 PROCEDURE — 1036F TOBACCO NON-USER: CPT | Performed by: NURSE PRACTITIONER

## 2019-04-08 PROCEDURE — G8427 DOCREV CUR MEDS BY ELIG CLIN: HCPCS | Performed by: NURSE PRACTITIONER

## 2019-04-08 PROCEDURE — G8420 CALC BMI NORM PARAMETERS: HCPCS | Performed by: NURSE PRACTITIONER

## 2019-04-08 PROCEDURE — 1123F ACP DISCUSS/DSCN MKR DOCD: CPT | Performed by: NURSE PRACTITIONER

## 2019-04-08 PROCEDURE — 99214 OFFICE O/P EST MOD 30 MIN: CPT | Performed by: NURSE PRACTITIONER

## 2019-04-08 PROCEDURE — 72220 X-RAY EXAM SACRUM TAILBONE: CPT

## 2019-04-08 RX ORDER — DONEPEZIL HYDROCHLORIDE 10 MG/1
10 TABLET, FILM COATED ORAL NIGHTLY
Qty: 30 TABLET | Refills: 2 | Status: SHIPPED | OUTPATIENT
Start: 2019-04-08 | End: 2019-08-10 | Stop reason: SDUPTHER

## 2019-04-08 ASSESSMENT — ENCOUNTER SYMPTOMS
VOMITING: 0
BACK PAIN: 1
FACIAL SWELLING: 0
SORE THROAT: 0
NAUSEA: 0
COUGH: 0
SINUS PRESSURE: 0
DIARRHEA: 0

## 2019-04-08 NOTE — PATIENT INSTRUCTIONS
Eggs - scrambled and hard boiled  Peanut butter - crackers, PBJ, banana  Increase two snacks during the day    [address] [address]    Due for 1 year with Dr Betsy Li - neurologist    Increase Aricept from 5 to 10mg daily evening

## 2019-04-08 NOTE — PROGRESS NOTES
Subjective:      Patient ID: Zina Gilbert is a 80 y.o. male. HPI   Chief Complaint   Patient presents with    Diabetes    Urinary Frequency     Pain in lower back continues near saccrum/coccyx even though pressure ulcer is healing with [facility]. States he is having bowel movements daily but then also says \"constipation\". History from patient is poor d/t memory loss and confusion. Son-in-law present. Son-in-law states that patient is sleeping at their home a few houses down.      Pt's memory has worsened over the last 4 months. States that he continues to drive and this is nice however son-in-law states it has been several months since patient has driven. Poor eating habits per son-in-law. Pt's children are caring for medications and confirming pt is taking medicine. He eats cereal for breakfast and a \"nice, good dinner\" with daughter and son-in-law in evening.      Son-in-law states it is hard to bathe patient and cannot recall the last time he changed clothes or bathed. CT neck showed questionable nonspecific sclerotic lesions in posterior aspect of C7, T1 and T2. Could not exclude metastatic disease. Pt at the time refused bone scan but since then he has tried performing bone scan but d/t body stature radiology was unable to perform. WBC 2.8 12/2018. Podiatrist - f/u 3/2019 - no concerns. Trimmed nails and cleansed feet. Prior to Visit Medications    Medication Sig Taking? Authorizing Provider   donepezil (ARICEPT) 10 MG tablet Take 1 tablet by mouth nightly MEMORY Yes TEMITOPE Green - CNP   blood glucose monitor kit and supplies Test one time a day & as needed for symptoms of irregular blood glucose. Please give whichever meter kit is allowed by insurance. Yes TEMITOPE Morrison   blood glucose monitor strips Test one times a day & as needed for symptoms of irregular blood glucose.   Please give whichever test strips are approved by insurance Yes TEMITOPE Morrison   ONE TOUCH LANCETS MISC 1 each by Does not apply route daily Yes TEMITOPE Santana   lisinopril-hydrochlorothiazide (PRINZIDE;ZESTORETIC) 20-25 MG per tablet TAKE ONE TABLET BY MOUTH ONCE DAILY Yes TEMITOPE Ewing - CNP   pravastatin (PRAVACHOL) 40 MG tablet TAKE ONE TABLET BY MOUTH ONCE DAILY Yes TEMITOPE Green - CNP   alendronate (FOSAMAX) 70 MG tablet TAKE ONE TABLET BY MOUTH ONCE A WEEK Yes TEMITOPE Ewing - CNP   carbidopa-levodopa (SINEMET)  MG per tablet  Yes Historical Provider, MD   therapeutic multivitamin-minerals (THERAGRAN-M) tablet Take 1 tablet by mouth daily. Yes Historical Provider, MD     Social History     Socioeconomic History    Marital status:       Spouse name: Not on file    Number of children: Not on file    Years of education: Not on file    Highest education level: Not on file   Occupational History    Not on file   Social Needs    Financial resource strain: Not on file    Food insecurity:     Worry: Not on file     Inability: Not on file    Transportation needs:     Medical: Not on file     Non-medical: Not on file   Tobacco Use    Smoking status: Former Smoker     Types: Cigarettes, Pipe, Cigars     Last attempt to quit: 1956     Years since quittin.3    Smokeless tobacco: Never Used   Substance and Sexual Activity    Alcohol use: Yes     Comment: 1 GLASS WINE  A DAY    Drug use: No    Sexual activity: Not on file   Lifestyle    Physical activity:     Days per week: Not on file     Minutes per session: Not on file    Stress: Not on file   Relationships    Social connections:     Talks on phone: Not on file     Gets together: Not on file     Attends Religion service: Not on file     Active member of club or organization: Not on file     Attends meetings of clubs or organizations: Not on file     Relationship status: Not on file    Intimate partner violence:     Fear of current or ex partner: Not on file     Emotionally abused: Not on file Physically abused: Not on file     Forced sexual activity: Not on file   Other Topics Concern    Not on file   Social History Narrative    Not on file     Family History   Problem Relation Age of Onset    Cancer Mother     Diabetes Mother     Alzheimer's Disease Father        Review of Systems   Constitutional: Negative for appetite change, chills, fatigue, fever and unexpected weight change. HENT: Negative for congestion, ear discharge, ear pain, facial swelling, hearing loss, sinus pressure, sneezing and sore throat. Respiratory: Negative for cough. Cardiovascular: Negative for chest pain. Gastrointestinal: Negative for diarrhea, nausea and vomiting. Genitourinary: Negative for difficulty urinating, dysuria, hematuria and urgency. Musculoskeletal: Positive for arthralgias, back pain, gait problem, neck pain and neck stiffness. Skin: Positive for wound (improving). Neurological: Negative for dizziness, weakness and headaches. Hematological: Negative for adenopathy. Psychiatric/Behavioral: Negative for sleep disturbance and suicidal ideas. Objective:   Physical Exam   Constitutional: He is oriented to person, place, and time. He appears well-developed and well-nourished. No distress. HENT:   Head: Normocephalic and atraumatic. Cardiovascular: Normal rate, regular rhythm, normal heart sounds and intact distal pulses. Pulmonary/Chest: Effort normal and breath sounds normal. He has no wheezes. Abdominal: Soft. Bowel sounds are normal. There is no tenderness. Neurological: He is alert and oriented to person, place, and time. No cranial nerve deficit. Skin: He is not diaphoretic. Psychiatric:   Pt repeated himself in office visit. Good eye contact. Shirt soiled. Assessment:      1. Cognitive impairment  Increase Aricept. Monitor closely. Reviewed importance of close monitoring patient. - donepezil (ARICEPT) 10 MG tablet;  Take 1 tablet by mouth nightly MEMORY Dispense: 30 tablet; Refill: 2    2. Lumbar pain  Order imaging. Monitor closely  - XR LUMBAR SPINE (2-3 VIEWS); Future  - XR SACRUM COCCYX (MIN 2 VIEWS); Future    3. Sacral pain  Order imaging. Monitor closely. - XR LUMBAR SPINE (2-3 VIEWS); Future  - XR SACRUM COCCYX (MIN 2 VIEWS); Future    4. Leukopenia, unspecified type  Referral Hem/Onc.   - [facility] - Shasha Zendejas MD, Oncology, [facility]    5. Abnormal CT scan, neck  Referral Hem/Onc  - XOCHILT Zendejas MD, Oncology, [facility]    6. Weight loss  Reviewed importance of high protein and fat diet. 7. Type 2 DM  Stable. Monitor BS at home. 8. Parkinson  F/u Dr Casie Ralph. Plan:      See above plan    Return in about 2 months (around 6/8/2019) for 30 min appt, weight check.           Ale Casillas, APRJERSON - CNP

## 2019-04-11 ENCOUNTER — TELEPHONE (OUTPATIENT)
Dept: INTERNAL MEDICINE CLINIC | Age: 84
End: 2019-04-11

## 2019-04-15 ENCOUNTER — TELEPHONE (OUTPATIENT)
Dept: INTERNAL MEDICINE CLINIC | Age: 84
End: 2019-04-15

## 2019-04-15 NOTE — TELEPHONE ENCOUNTER
[place], please get me HEM/ONC records I thought I've reviewed them? Dr Sophie Ybarra - needs to prescribe Sinemet. I do not prescribe for him.

## 2019-04-15 NOTE — TELEPHONE ENCOUNTER
[facility] , spoke with Juan M Julio ,  [address] spoke with Samra Fagan aware of calling Dr. Gillian Coleman for Sinemet. Will await [facility] notes for Gerianne Sep evaluation.

## 2019-04-15 NOTE — TELEPHONE ENCOUNTER
Per daughter, The carvedilol was prescribed by Dr. Dexter Benedict [facility]. Also, Pt has seen the doctor at [facility] - they could not get a CT scan because he cannot lie down flat. Daughter asking if there is another reason why he needs to see someone there or is this the continuation of last evaluation? ?  Call back at  [phone]

## 2019-04-15 NOTE — TELEPHONE ENCOUNTER
Ashley Early, APRN - CNP  [facility]             Xray shows low bone density (osteopenia) and some compression deformities L1 -L5 but these are stable from his hx compression fractures in 2016. No other remarkable findings. Recommend evaluation with Hem/Onc to evaluate Leukopenia. I will place referral.      [phone]  Spoke with Taran Berman , given xray results , they with call [facility] and schedule an appointment. Given phone number. Refill request for sinement  medication.      Name of Pharmacy- [facility]    Last visit - 4-8-19     Pending visit - 6-28-19    Last refill -5-31-17    Medication Contract signed -   Last Kristen lama-     Additional Comments

## 2019-04-17 NOTE — TELEPHONE ENCOUNTER
Let daughter know that his WBC count has decreased even more since seeing [facility] in 1/2019 and I'd like to see if they recommend any treatment. Also after [facility] - appears they recommended performing repeat CT and include chest/abdomen and pelvis to evaluate for any more lesions that were found on the previous neck CT - did they have this performed? Are they planning on performing?

## 2019-04-17 NOTE — TELEPHONE ENCOUNTER
[phone] spoke with Magan Arevalo , they will schedule with [facility] for follow up due to WBC count.    They have not been able to do the CT scan , as patient is unable to lay for the test.

## 2019-04-18 NOTE — TELEPHONE ENCOUNTER
Does he need to lay for CT scan or chest/neck and abdomen/pelvis? I know he cannot perform bone scan b/c of lying but I was not familiar with CT scan b/c he has had one recently.

## 2019-04-24 NOTE — TELEPHONE ENCOUNTER
Spoke with radiology , he would need to lay down for CT scan. I spoke with Ezequiel Laura they state he has not had a CT done .

## 2019-04-26 NOTE — TELEPHONE ENCOUNTER
[phone] (home) [phone] (work)  Spoke with Nisha Eckert, encouraged them to make appointment with [facility]

## 2019-04-26 NOTE — TELEPHONE ENCOUNTER
Noted. Sounds like he will not be able to do CT.  Confirm they set up [facility] appt

## 2019-05-08 ENCOUNTER — TELEPHONE (OUTPATIENT)
Dept: PHARMACY | Facility: CLINIC | Age: 84
End: 2019-05-08

## 2019-05-08 NOTE — TELEPHONE ENCOUNTER
CLINICAL PHARMACY CONSULT: MED RECONCILIATION/REVIEW ADDENDUM    For Pharmacy Admin Tracking Only    PHSO: Yes  Total # of Interventions Recommended: 1  - New Order #: 0 New Medication Order Reason(s): Adherence  - Maintenance Safety Lab Monitoring #: 1  Recommended intervention potential cost savings: 0  Time Spent (min): 15    Love Booth CPhT.   [address]

## 2019-06-07 RX ORDER — ALENDRONATE SODIUM 70 MG/1
TABLET ORAL
Qty: 12 TABLET | Refills: 9 | Status: SHIPPED | OUTPATIENT
Start: 2019-06-07 | End: 2019-11-04

## 2019-06-07 NOTE — TELEPHONE ENCOUNTER
Refill request for alendronate  medication.      Name of Pharmacy- [facility]    Last visit - 4-8-19     Pending visit - 6-28-19    Last refill -4-8-19    Medication Contract signed -   Last Andres lama-     Additional Comments

## 2019-06-28 ENCOUNTER — OFFICE VISIT (OUTPATIENT)
Dept: INTERNAL MEDICINE CLINIC | Age: 84
End: 2019-06-28
Payer: MEDICARE

## 2019-06-28 VITALS
SYSTOLIC BLOOD PRESSURE: 124 MMHG | HEART RATE: 62 BPM | OXYGEN SATURATION: 97 % | DIASTOLIC BLOOD PRESSURE: 80 MMHG | BODY MASS INDEX: 20.8 KG/M2 | WEIGHT: 125 LBS

## 2019-06-28 DIAGNOSIS — I10 ESSENTIAL HYPERTENSION: ICD-10-CM

## 2019-06-28 DIAGNOSIS — M54.50 CHRONIC BILATERAL LOW BACK PAIN WITHOUT SCIATICA: ICD-10-CM

## 2019-06-28 DIAGNOSIS — Z95.0 PACEMAKER: ICD-10-CM

## 2019-06-28 DIAGNOSIS — R41.89 COGNITIVE IMPAIRMENT: ICD-10-CM

## 2019-06-28 DIAGNOSIS — E78.2 MIXED HYPERLIPIDEMIA: ICD-10-CM

## 2019-06-28 DIAGNOSIS — E11.9 TYPE 2 DIABETES MELLITUS WITHOUT COMPLICATION, WITHOUT LONG-TERM CURRENT USE OF INSULIN (HCC): Primary | ICD-10-CM

## 2019-06-28 DIAGNOSIS — R93.89 ABNORMAL CT SCAN, NECK: ICD-10-CM

## 2019-06-28 DIAGNOSIS — G89.29 CHRONIC BILATERAL LOW BACK PAIN WITHOUT SCIATICA: ICD-10-CM

## 2019-06-28 DIAGNOSIS — D72.819 LEUKOPENIA, UNSPECIFIED TYPE: ICD-10-CM

## 2019-06-28 LAB — HBA1C MFR BLD: 7.7 %

## 2019-06-28 PROCEDURE — 1036F TOBACCO NON-USER: CPT | Performed by: NURSE PRACTITIONER

## 2019-06-28 PROCEDURE — G8428 CUR MEDS NOT DOCUMENT: HCPCS | Performed by: NURSE PRACTITIONER

## 2019-06-28 PROCEDURE — 1123F ACP DISCUSS/DSCN MKR DOCD: CPT | Performed by: NURSE PRACTITIONER

## 2019-06-28 PROCEDURE — G8420 CALC BMI NORM PARAMETERS: HCPCS | Performed by: NURSE PRACTITIONER

## 2019-06-28 PROCEDURE — 99215 OFFICE O/P EST HI 40 MIN: CPT | Performed by: NURSE PRACTITIONER

## 2019-06-28 PROCEDURE — 4040F PNEUMOC VAC/ADMIN/RCVD: CPT | Performed by: NURSE PRACTITIONER

## 2019-06-28 PROCEDURE — 83036 HEMOGLOBIN GLYCOSYLATED A1C: CPT | Performed by: NURSE PRACTITIONER

## 2019-06-28 NOTE — PROGRESS NOTES
Subjective:      Patient ID: Ivan Tello is a 80 y.o. male. HPI   Chief Complaint   Patient presents with    Diabetes     Pain in lower back continues near saccrum/coccyx even though pressure ulcer is healing with [address] treatment and discharged from care. He will use Aleve AM and PM but no other pain medication.      History from patient is poor d/t memory loss and confusion. Daughter and Son-in-law present.      Son-in-law states that patient is sleeping at their home a few houses down but then returns to his home during the day. A relative checks on him and he eats breakfast and dinner with relative.      Pt's memory continues to worsen. Pt's children are caring for medications and confirming pt is taking medicine. He eats cereal for breakfast and a \"nice, good dinner\" with daughter and son-in-law in evening.      Son-in-law states it is hard to bathe patient but they are trying to perform sponge baths more frequently.      CT neck showed questionable nonspecific sclerotic lesions in posterior aspect of C7, T1 and T2. Could not exclude metastatic disease. Pt at the time refused bone scan but since then he has tried performing bone scan but d/t body stature radiology was unable to perform. WBC 2.8 12/2018. Had followed up with hem/onc who stated f/u if any further symptoms.      Podiatrist - f/u 3/2019 - no concerns. Trimmed nails and cleansed feet. Prior to Visit Medications    Medication Sig Taking? Authorizing Provider   alendronate (FOSAMAX) 70 MG tablet TAKE 1 TABLET BY MOUTH ONCE A WEEK Yes TEMITOPE Green CNP   donepezil (ARICEPT) 10 MG tablet Take 1 tablet by mouth nightly MEMORY Yes TEMITOPE Green CNP   blood glucose monitor kit and supplies Test one time a day & as needed for symptoms of irregular blood glucose. Please give whichever meter kit is allowed by insurance.  Yes TEMITOPE Shaffer CNP   blood glucose monitor strips Test one times a day & as needed for symptoms of irregular blood glucose. Please give whichever test strips are approved by insurance Yes TEMITOPE Contreras CNP   ONE TOUCH LANCETS MISC 1 each by Does not apply route daily Yes TEMITOPE Contreras CNP   lisinopril-hydrochlorothiazide (PRINZIDE;ZESTORETIC) 20-25 MG per tablet TAKE ONE TABLET BY MOUTH ONCE DAILY Yes TEMITOPE Méndez - CNP   pravastatin (PRAVACHOL) 40 MG tablet TAKE ONE TABLET BY MOUTH ONCE DAILY Yes TEMITOPE Green - CNP   carbidopa-levodopa (SINEMET)  MG per tablet  Yes Historical Provider, MD   therapeutic multivitamin-minerals (THERAGRAN-M) tablet Take 1 tablet by mouth daily. Yes Historical Provider, MD     Social History     Socioeconomic History    Marital status:       Spouse name: Not on file    Number of children: Not on file    Years of education: Not on file    Highest education level: Not on file   Occupational History    Not on file   Social Needs    Financial resource strain: Not on file    Food insecurity:     Worry: Not on file     Inability: Not on file    Transportation needs:     Medical: Not on file     Non-medical: Not on file   Tobacco Use    Smoking status: Former Smoker     Types: Cigarettes, Pipe, Cigars     Last attempt to quit: 1956     Years since quittin.5    Smokeless tobacco: Never Used   Substance and Sexual Activity    Alcohol use: Yes     Comment: 1 GLASS WINE  A DAY    Drug use: No    Sexual activity: Not on file   Lifestyle    Physical activity:     Days per week: Not on file     Minutes per session: Not on file    Stress: Not on file   Relationships    Social connections:     Talks on phone: Not on file     Gets together: Not on file     Attends Restorationist service: Not on file     Active member of club or organization: Not on file     Attends meetings of clubs or organizations: Not on file     Relationship status: Not on file    Intimate partner violence:     Fear of current or ex partner: Not on file     Emotionally abused: Not on file     Physically abused: Not on file     Forced sexual activity: Not on file   Other Topics Concern    Not on file   Social History Narrative    Not on file     Family History   Problem Relation Age of Onset    Cancer Mother     Diabetes Mother     Alzheimer's Disease Father        Review of Systems   Constitutional: Negative for appetite change, chills, fatigue, fever and unexpected weight change. HENT: Negative for congestion, ear discharge, ear pain, facial swelling, hearing loss, sinus pressure, sneezing and sore throat. Respiratory: Negative for cough. Cardiovascular: Negative for chest pain. Gastrointestinal: Negative for diarrhea, nausea and vomiting. Genitourinary: Negative for difficulty urinating, dysuria, hematuria and urgency. Musculoskeletal: Negative for arthralgias and gait problem. Neurological: Negative for dizziness, weakness and headaches. Memory loss   Hematological: Negative for adenopathy. Psychiatric/Behavioral: Negative for sleep disturbance and suicidal ideas. Objective:   Physical Exam   Constitutional: He is oriented to person, place, and time. He appears well-developed. No distress. HENT:   Head: Normocephalic and atraumatic. Cardiovascular: Normal rate, regular rhythm, normal heart sounds and intact distal pulses. Pulmonary/Chest: Effort normal and breath sounds normal. He has no wheezes. Abdominal: Soft. Bowel sounds are normal. There is no tenderness. Musculoskeletal: He exhibits no edema. Neurological: He is alert and oriented to person, place, and time. No cranial nerve deficit. Skin: He is not diaphoretic. Well healing erythematous pressure sore over sacrum. Psychiatric: He has a normal mood and affect. His behavior is normal. Thought content normal.       Assessment:       1.  Type 2 diabetes mellitus without complication, without long-term current use of insulin ([facility])  A1c 7.7  Continue to monitor closely    - POCT glycosylated hemoglobin (Hb A1C)    2. Essential hypertension  Monitor    3. Mixed hyperlipidemia  monitor    4. Pacemaker  Monitor    5. Cognitive impairment  Monitor    6. Abnormal CT scan, neck  Monitor    7. Leukopenia, unspecified type  Monitor    8. Chronic bilateral low back pain without sciatica  Enc to add Tylenol ES every 6 hours as needed  Use donut pillow when seated. Plan:      See above plan    Reviewed importance of increasing food intake, high protein, high fat. Relatives will try to eat lunch with him as well as breakfast and dinner. Given names and #s of helpful resources in area. Return in about 3 months (around 9/28/2019) for 30 min appt, weight check f/u.           Hugh Garcia, TEMITOPE - CNP

## 2019-06-30 PROBLEM — D72.819 LEUKOPENIA: Status: ACTIVE | Noted: 2019-06-30

## 2019-06-30 PROBLEM — R41.89 COGNITIVE IMPAIRMENT: Status: ACTIVE | Noted: 2019-06-30

## 2019-06-30 ASSESSMENT — ENCOUNTER SYMPTOMS
SORE THROAT: 0
COUGH: 0
SINUS PRESSURE: 0
NAUSEA: 0
FACIAL SWELLING: 0
VOMITING: 0
DIARRHEA: 0

## 2019-08-07 DIAGNOSIS — R41.89 COGNITIVE IMPAIRMENT: ICD-10-CM

## 2019-08-09 DIAGNOSIS — R41.89 COGNITIVE IMPAIRMENT: ICD-10-CM

## 2019-08-09 NOTE — TELEPHONE ENCOUNTER
Refill request for Aricept medication.      Name of [id number] visit - 6-28-19     Pending visit - 9-27-19    Last refill - 4-8-19    Medication Contract signed -   Last Gabriela lama-     Additional Comments

## 2019-08-10 RX ORDER — DONEPEZIL HYDROCHLORIDE 10 MG/1
10 TABLET, FILM COATED ORAL NIGHTLY
Qty: 30 TABLET | Refills: 2 | Status: SHIPPED | OUTPATIENT
Start: 2019-08-10 | End: 2019-11-20 | Stop reason: SDUPTHER

## 2019-08-10 RX ORDER — DONEPEZIL HYDROCHLORIDE 10 MG/1
TABLET, FILM COATED ORAL
Qty: 30 TABLET | Refills: 2 | OUTPATIENT
Start: 2019-08-10

## 2019-09-04 ENCOUNTER — HOSPITAL ENCOUNTER (OUTPATIENT)
Age: 84
Discharge: HOME | End: 2019-09-04
Payer: MEDICARE

## 2019-09-04 ENCOUNTER — OFFICE VISIT (OUTPATIENT)
Dept: INTERNAL MEDICINE CLINIC | Age: 84
End: 2019-09-04
Payer: MEDICARE

## 2019-09-04 ENCOUNTER — HOSPITAL ENCOUNTER (OUTPATIENT)
Dept: GENERAL RADIOLOGY | Age: 84
Discharge: HOME | End: 2019-09-04
Payer: MEDICARE

## 2019-09-04 VITALS
DIASTOLIC BLOOD PRESSURE: 62 MMHG | HEIGHT: 72 IN | WEIGHT: 129 LBS | RESPIRATION RATE: 16 BRPM | BODY MASS INDEX: 17.47 KG/M2 | HEART RATE: 67 BPM | SYSTOLIC BLOOD PRESSURE: 116 MMHG | OXYGEN SATURATION: 81 %

## 2019-09-04 DIAGNOSIS — D72.819 LEUKOPENIA, UNSPECIFIED TYPE: ICD-10-CM

## 2019-09-04 DIAGNOSIS — R35.0 URINARY FREQUENCY: ICD-10-CM

## 2019-09-04 DIAGNOSIS — R41.89 COGNITIVE IMPAIRMENT: ICD-10-CM

## 2019-09-04 DIAGNOSIS — K59.00 CONSTIPATION, UNSPECIFIED CONSTIPATION TYPE: ICD-10-CM

## 2019-09-04 DIAGNOSIS — Z95.0 PACEMAKER: ICD-10-CM

## 2019-09-04 DIAGNOSIS — E78.2 MIXED HYPERLIPIDEMIA: ICD-10-CM

## 2019-09-04 DIAGNOSIS — I10 ESSENTIAL HYPERTENSION: ICD-10-CM

## 2019-09-04 DIAGNOSIS — E11.9 TYPE 2 DIABETES MELLITUS WITHOUT COMPLICATION, WITHOUT LONG-TERM CURRENT USE OF INSULIN (HCC): Primary | ICD-10-CM

## 2019-09-04 LAB
A/G RATIO: 1.4 (ref 1.1–2.2)
ALBUMIN SERPL-MCNC: 4.2 G/DL (ref 3.4–5)
ALP BLD-CCNC: 65 U/L (ref 40–129)
ALT SERPL-CCNC: 6 U/L (ref 10–40)
ANION GAP SERPL CALCULATED.3IONS-SCNC: 13 MMOL/L (ref 3–16)
AST SERPL-CCNC: 23 U/L (ref 15–37)
BASOPHILS ABSOLUTE: 0.1 K/UL (ref 0–0.2)
BASOPHILS RELATIVE PERCENT: 1.9 %
BILIRUB SERPL-MCNC: 0.4 MG/DL (ref 0–1)
BILIRUBIN, POC: NORMAL
BLOOD URINE, POC: NORMAL
BUN BLDV-MCNC: 39 MG/DL (ref 7–20)
CALCIUM SERPL-MCNC: 9.8 MG/DL (ref 8.3–10.6)
CHLORIDE BLD-SCNC: 99 MMOL/L (ref 99–110)
CHOLESTEROL, TOTAL: 161 MG/DL (ref 0–199)
CLARITY, POC: NORMAL
CO2: 29 MMOL/L (ref 21–32)
COLOR, POC: NORMAL
CREAT SERPL-MCNC: 0.7 MG/DL (ref 0.8–1.3)
EOSINOPHILS ABSOLUTE: 0 K/UL (ref 0–0.6)
EOSINOPHILS RELATIVE PERCENT: 1 %
GFR AFRICAN AMERICAN: >60
GFR NON-AFRICAN AMERICAN: >60
GLOBULIN: 3.1 G/DL
GLUCOSE BLD-MCNC: 208 MG/DL (ref 70–99)
GLUCOSE URINE, POC: NORMAL
HCT VFR BLD CALC: 37.9 % (ref 40.5–52.5)
HDLC SERPL-MCNC: 72 MG/DL (ref 40–60)
HEMOGLOBIN: 13 G/DL (ref 13.5–17.5)
KETONES, POC: 15
LDL CHOLESTEROL CALCULATED: 69 MG/DL
LEUKOCYTE EST, POC: NORMAL
LYMPHOCYTES ABSOLUTE: 0.8 K/UL (ref 1–5.1)
LYMPHOCYTES RELATIVE PERCENT: 22 %
MCH RBC QN AUTO: 33.2 PG (ref 26–34)
MCHC RBC AUTO-ENTMCNC: 34.2 G/DL (ref 31–36)
MCV RBC AUTO: 97 FL (ref 80–100)
MONOCYTES ABSOLUTE: 0.4 K/UL (ref 0–1.3)
MONOCYTES RELATIVE PERCENT: 10.2 %
NEUTROPHILS ABSOLUTE: 2.2 K/UL (ref 1.7–7.7)
NEUTROPHILS RELATIVE PERCENT: 64.9 %
NITRITE, POC: NORMAL
PDW BLD-RTO: 12.9 % (ref 12.4–15.4)
PH, POC: 5
PLATELET # BLD: 177 K/UL (ref 135–450)
PMV BLD AUTO: 9.9 FL (ref 5–10.5)
POTASSIUM SERPL-SCNC: 4.2 MMOL/L (ref 3.5–5.1)
PROTEIN, POC: NORMAL
RBC # BLD: 3.91 M/UL (ref 4.2–5.9)
SODIUM BLD-SCNC: 141 MMOL/L (ref 136–145)
SPECIFIC GRAVITY, POC: 1.01
TOTAL PROTEIN: 7.3 G/DL (ref 6.4–8.2)
TRIGL SERPL-MCNC: 99 MG/DL (ref 0–150)
UROBILINOGEN, POC: NORMAL
VLDLC SERPL CALC-MCNC: 20 MG/DL
WBC # BLD: 3.5 K/UL (ref 4–11)

## 2019-09-04 PROCEDURE — 4040F PNEUMOC VAC/ADMIN/RCVD: CPT | Performed by: NURSE PRACTITIONER

## 2019-09-04 PROCEDURE — 81003 URINALYSIS AUTO W/O SCOPE: CPT | Performed by: NURSE PRACTITIONER

## 2019-09-04 PROCEDURE — 1036F TOBACCO NON-USER: CPT | Performed by: NURSE PRACTITIONER

## 2019-09-04 PROCEDURE — 80061 LIPID PANEL: CPT

## 2019-09-04 PROCEDURE — 80053 COMPREHEN METABOLIC PANEL: CPT

## 2019-09-04 PROCEDURE — 36415 COLL VENOUS BLD VENIPUNCTURE: CPT

## 2019-09-04 PROCEDURE — 81002 URINALYSIS NONAUTO W/O SCOPE: CPT | Performed by: NURSE PRACTITIONER

## 2019-09-04 PROCEDURE — G8427 DOCREV CUR MEDS BY ELIG CLIN: HCPCS | Performed by: NURSE PRACTITIONER

## 2019-09-04 PROCEDURE — 74018 RADEX ABDOMEN 1 VIEW: CPT

## 2019-09-04 PROCEDURE — G8419 CALC BMI OUT NRM PARAM NOF/U: HCPCS | Performed by: NURSE PRACTITIONER

## 2019-09-04 PROCEDURE — 85025 COMPLETE CBC W/AUTO DIFF WBC: CPT

## 2019-09-04 PROCEDURE — 99214 OFFICE O/P EST MOD 30 MIN: CPT | Performed by: NURSE PRACTITIONER

## 2019-09-04 PROCEDURE — 1123F ACP DISCUSS/DSCN MKR DOCD: CPT | Performed by: NURSE PRACTITIONER

## 2019-09-04 RX ORDER — TAMSULOSIN HYDROCHLORIDE 0.4 MG/1
0.4 CAPSULE ORAL DAILY
Qty: 90 CAPSULE | Refills: 1 | Status: SHIPPED | OUTPATIENT
Start: 2019-09-04 | End: 2020-03-16

## 2019-09-04 ASSESSMENT — PATIENT HEALTH QUESTIONNAIRE - PHQ9
SUM OF ALL RESPONSES TO PHQ QUESTIONS 1-9: 0
1. LITTLE INTEREST OR PLEASURE IN DOING THINGS: 0
SUM OF ALL RESPONSES TO PHQ9 QUESTIONS 1 & 2: 0
SUM OF ALL RESPONSES TO PHQ QUESTIONS 1-9: 0
2. FEELING DOWN, DEPRESSED OR HOPELESS: 0

## 2019-09-04 NOTE — PROGRESS NOTES
Subjective:      Patient ID: Lianne Watson is a 80 y.o. male. HPI   Chief Complaint   Patient presents with    Weight Management     3 Month F/U Check weight    Benign Prostatic Hypertrophy       Pain in lower back continues near saccrum/coccyx even though pressure ulcer is healing with [address] treatment and discharged from care. He will use Aleve AM and PM but no other pain medication except for Tylenol as needed.      History from patient is poor d/t memory loss and confusion. Son-in-law present.      Son-in-law states that patient is sleeping at their home a few houses down but then returns to his home during the day. A relative checks on him and he eats breakfast and dinner with relative. They do not think that he is drinking enough or eating enough (weight up 4 lbs).      Pt's memory continues to worsen. Pt's children are caring for medications and confirming pt is taking medicine. He eats cereal for breakfast and a \"nice, good dinner\" with daughter and son-in-law in evening.      Son-in-law states it is hard to bathe patient but they are trying to perform sponge baths more frequently.      CT neck showed questionable nonspecific sclerotic lesions in posterior aspect of C7, T1 and T2. Could not exclude metastatic disease. Pt at the time refused bone scan but since then he has tried performing bone scan but d/t body stature radiology was unable to perform. WBC 2.8 12/2018. Had followed up with hem/onc who stated f/u if any further symptoms.      Podiatrist - f/u 3/2019 - no concerns. Trimmed nails and cleansed feet.     Prior to Visit Medications    Medication Sig Taking? Authorizing Provider   tamsulosin (FLOMAX) 0.4 MG capsule Take 1 capsule by mouth daily Yes TEMITOPE Green CNP   donepezil (ARICEPT) 10 MG tablet Take 1 tablet by mouth nightly MEMORY Yes TEMITOPE Green CNP   blood glucose monitor kit and supplies Test one time a day & as needed for symptoms of irregular blood glucose. Please give whichever meter kit is allowed by insurance. Yes TEMITOPE Martínez CNP   blood glucose monitor strips Test one times a day & as needed for symptoms of irregular blood glucose. Please give whichever test strips are approved by insurance Yes TEMITOPE Martínez CNP   ONE TOUCH LANCETS MISC 1 each by Does not apply route daily Yes TEMITOPE Martínez CNP   lisinopril-hydrochlorothiazide (PRINZIDE;ZESTORETIC) 20-25 MG per tablet TAKE ONE TABLET BY MOUTH ONCE DAILY Yes TEMITOPE Wallace CNP   pravastatin (PRAVACHOL) 40 MG tablet TAKE ONE TABLET BY MOUTH ONCE DAILY Yes TEMITOPE Green CNP   carbidopa-levodopa (SINEMET)  MG per tablet  Yes Historical Provider, MD   therapeutic multivitamin-minerals (THERAGRAN-M) tablet Take 1 tablet by mouth daily. Yes Historical Provider, MD   alendronate (FOSAMAX) 70 MG tablet TAKE 1 TABLET BY MOUTH ONCE A WEEK  Patient not taking: Reported on 2019  TEMITOPE Sweet CNP     Social History     Socioeconomic History    Marital status:       Spouse name: Not on file    Number of children: Not on file    Years of education: Not on file    Highest education level: Not on file   Occupational History    Not on file   Social Needs    Financial resource strain: Not on file    Food insecurity:     Worry: Not on file     Inability: Not on file    Transportation needs:     Medical: Not on file     Non-medical: Not on file   Tobacco Use    Smoking status: Former Smoker     Types: Cigarettes, Pipe, Cigars     Last attempt to quit: 1956     Years since quittin.7    Smokeless tobacco: Never Used   Substance and Sexual Activity    Alcohol use: Yes     Comment: 1 GLASS WINE  A DAY    Drug use: No    Sexual activity: Not on file   Lifestyle    Physical activity:     Days per week: Not on file     Minutes per session: Not on file    Stress: Not on file   Relationships    Social connections:     Talks on phone: Not on file     Gets

## 2019-09-04 NOTE — PATIENT INSTRUCTIONS
1) Tamsulosin 1 pill daily to help with urinary frequency    2) Call [facility] for extra resources    3) Xray abdomen    4) Blood work today

## 2019-09-05 ASSESSMENT — ENCOUNTER SYMPTOMS
NAUSEA: 0
VOMITING: 0
SORE THROAT: 0
BACK PAIN: 1
COUGH: 0
SINUS PRESSURE: 0
CONSTIPATION: 1
FACIAL SWELLING: 0
DIARRHEA: 0

## 2019-09-06 LAB — URINE CULTURE, ROUTINE: NORMAL

## 2019-10-14 DIAGNOSIS — E78.2 MIXED HYPERLIPIDEMIA: ICD-10-CM

## 2019-10-14 RX ORDER — PRAVASTATIN SODIUM 40 MG
TABLET ORAL
Qty: 90 TABLET | Refills: 3 | Status: SHIPPED | OUTPATIENT
Start: 2019-10-14 | End: 2020-03-16 | Stop reason: SDUPTHER

## 2019-11-01 DIAGNOSIS — I10 ESSENTIAL HYPERTENSION: ICD-10-CM

## 2019-11-01 RX ORDER — LISINOPRIL AND HYDROCHLOROTHIAZIDE 25; 20 MG/1; MG/1
TABLET ORAL
Qty: 90 TABLET | Refills: 3 | Status: SHIPPED
Start: 2019-11-01 | End: 2020-02-05 | Stop reason: DRUGHIGH

## 2019-11-04 ENCOUNTER — OFFICE VISIT (OUTPATIENT)
Dept: INTERNAL MEDICINE CLINIC | Age: 84
End: 2019-11-04
Payer: MEDICARE

## 2019-11-04 VITALS
WEIGHT: 139 LBS | BODY MASS INDEX: 18.85 KG/M2 | SYSTOLIC BLOOD PRESSURE: 138 MMHG | HEART RATE: 102 BPM | OXYGEN SATURATION: 97 % | DIASTOLIC BLOOD PRESSURE: 90 MMHG

## 2019-11-04 DIAGNOSIS — K59.00 CONSTIPATION, UNSPECIFIED CONSTIPATION TYPE: ICD-10-CM

## 2019-11-04 DIAGNOSIS — R35.0 BENIGN PROSTATIC HYPERPLASIA WITH URINARY FREQUENCY: ICD-10-CM

## 2019-11-04 DIAGNOSIS — N40.1 BENIGN PROSTATIC HYPERPLASIA WITH URINARY FREQUENCY: ICD-10-CM

## 2019-11-04 DIAGNOSIS — Z23 NEED FOR INFLUENZA VACCINATION: ICD-10-CM

## 2019-11-04 DIAGNOSIS — E11.9 TYPE 2 DIABETES MELLITUS WITHOUT COMPLICATION, WITHOUT LONG-TERM CURRENT USE OF INSULIN (HCC): Primary | ICD-10-CM

## 2019-11-04 DIAGNOSIS — R41.89 COGNITIVE IMPAIRMENT: ICD-10-CM

## 2019-11-04 DIAGNOSIS — E78.2 MIXED HYPERLIPIDEMIA: ICD-10-CM

## 2019-11-04 DIAGNOSIS — I10 ESSENTIAL HYPERTENSION: ICD-10-CM

## 2019-11-04 LAB — HBA1C MFR BLD: 7.9 %

## 2019-11-04 PROCEDURE — G8427 DOCREV CUR MEDS BY ELIG CLIN: HCPCS | Performed by: NURSE PRACTITIONER

## 2019-11-04 PROCEDURE — 99214 OFFICE O/P EST MOD 30 MIN: CPT | Performed by: NURSE PRACTITIONER

## 2019-11-04 PROCEDURE — 1123F ACP DISCUSS/DSCN MKR DOCD: CPT | Performed by: NURSE PRACTITIONER

## 2019-11-04 PROCEDURE — 83036 HEMOGLOBIN GLYCOSYLATED A1C: CPT | Performed by: NURSE PRACTITIONER

## 2019-11-04 PROCEDURE — G8420 CALC BMI NORM PARAMETERS: HCPCS | Performed by: NURSE PRACTITIONER

## 2019-11-04 PROCEDURE — G8482 FLU IMMUNIZE ORDER/ADMIN: HCPCS | Performed by: NURSE PRACTITIONER

## 2019-11-04 PROCEDURE — 4040F PNEUMOC VAC/ADMIN/RCVD: CPT | Performed by: NURSE PRACTITIONER

## 2019-11-04 PROCEDURE — G0008 ADMIN INFLUENZA VIRUS VAC: HCPCS | Performed by: NURSE PRACTITIONER

## 2019-11-04 PROCEDURE — 1036F TOBACCO NON-USER: CPT | Performed by: NURSE PRACTITIONER

## 2019-11-04 PROCEDURE — 90653 IIV ADJUVANT VACCINE IM: CPT | Performed by: NURSE PRACTITIONER

## 2019-11-04 ASSESSMENT — ENCOUNTER SYMPTOMS
FACIAL SWELLING: 0
COUGH: 0
NAUSEA: 0
SORE THROAT: 0
DIARRHEA: 0
VOMITING: 0
SINUS PRESSURE: 0

## 2019-11-20 DIAGNOSIS — R41.89 COGNITIVE IMPAIRMENT: ICD-10-CM

## 2019-11-20 RX ORDER — DONEPEZIL HYDROCHLORIDE 10 MG/1
TABLET, FILM COATED ORAL
Qty: 30 TABLET | Refills: 2 | Status: SHIPPED | OUTPATIENT
Start: 2019-11-20 | End: 2020-03-16

## 2020-01-01 ENCOUNTER — TELEPHONE (OUTPATIENT)
Dept: INTERNAL MEDICINE CLINIC | Age: 85
End: 2020-01-01

## 2020-01-01 ENCOUNTER — APPOINTMENT (OUTPATIENT)
Dept: CT IMAGING | Age: 85
DRG: 682 | End: 2020-01-01
Payer: MEDICARE

## 2020-01-01 ENCOUNTER — HOSPITAL ENCOUNTER (OUTPATIENT)
Age: 85
Discharge: HOME | End: 2020-05-29
Payer: MEDICARE

## 2020-01-01 ENCOUNTER — CARE COORDINATION (OUTPATIENT)
Dept: CASE MANAGEMENT | Age: 85
End: 2020-01-01

## 2020-01-01 ENCOUNTER — TELEMEDICINE (OUTPATIENT)
Dept: INTERNAL MEDICINE CLINIC | Age: 85
End: 2020-01-01
Payer: MEDICARE

## 2020-01-01 ENCOUNTER — TELEPHONE (OUTPATIENT)
Dept: PHARMACY | Facility: CLINIC | Age: 85
End: 2020-01-01

## 2020-01-01 ENCOUNTER — HOSPITAL ENCOUNTER (OUTPATIENT)
Age: 85
Discharge: HOME | End: 2020-05-15
Payer: MEDICARE

## 2020-01-01 ENCOUNTER — APPOINTMENT (OUTPATIENT)
Dept: GENERAL RADIOLOGY | Age: 85
DRG: 682 | End: 2020-01-01
Payer: MEDICARE

## 2020-01-01 ENCOUNTER — HOSPITAL ENCOUNTER (INPATIENT)
Age: 85
LOS: 3 days | Discharge: HOME HEALTH | DRG: 682 | End: 2020-04-30
Attending: EMERGENCY MEDICINE
Payer: MEDICARE

## 2020-01-01 ENCOUNTER — HOSPITAL ENCOUNTER (OUTPATIENT)
Age: 85
Discharge: HOME | End: 2020-09-28
Payer: MEDICARE

## 2020-01-01 ENCOUNTER — OFFICE VISIT (OUTPATIENT)
Dept: PRIMARY CARE CLINIC | Age: 85
End: 2020-01-01
Payer: MEDICARE

## 2020-01-01 ENCOUNTER — OFFICE VISIT (OUTPATIENT)
Dept: INTERNAL MEDICINE CLINIC | Age: 85
End: 2020-01-01
Payer: MEDICARE

## 2020-01-01 VITALS
WEIGHT: 133.4 LBS | HEIGHT: 72 IN | OXYGEN SATURATION: 99 % | RESPIRATION RATE: 18 BRPM | SYSTOLIC BLOOD PRESSURE: 112 MMHG | TEMPERATURE: 97.3 F | HEART RATE: 65 BPM | BODY MASS INDEX: 18.07 KG/M2 | DIASTOLIC BLOOD PRESSURE: 59 MMHG

## 2020-01-01 VITALS — BODY MASS INDEX: 20.61 KG/M2 | WEIGHT: 152 LBS | TEMPERATURE: 97.3 F

## 2020-01-01 VITALS — SYSTOLIC BLOOD PRESSURE: 145 MMHG | HEART RATE: 78 BPM | DIASTOLIC BLOOD PRESSURE: 90 MMHG

## 2020-01-01 LAB
A/G RATIO: 1.4 (ref 1.1–2.2)
A/G RATIO: 1.5 (ref 1.1–2.2)
ALBUMIN SERPL-MCNC: 3.2 G/DL (ref 3.4–5)
ALBUMIN SERPL-MCNC: 3.2 G/DL (ref 3.4–5)
ALBUMIN SERPL-MCNC: 3.4 G/DL (ref 3.4–5)
ALBUMIN SERPL-MCNC: 3.8 G/DL (ref 3.4–5)
ALBUMIN SERPL-MCNC: 4.2 G/DL (ref 3.4–5)
ALBUMIN SERPL-MCNC: 4.4 G/DL (ref 3.4–5)
ALP BLD-CCNC: 78 U/L (ref 40–129)
ALP BLD-CCNC: 85 U/L (ref 40–129)
ALT SERPL-CCNC: 14 U/L (ref 10–40)
ALT SERPL-CCNC: 6 U/L (ref 10–40)
ANION GAP SERPL CALCULATED.3IONS-SCNC: 10 MMOL/L (ref 3–16)
ANION GAP SERPL CALCULATED.3IONS-SCNC: 11 MMOL/L (ref 3–16)
ANION GAP SERPL CALCULATED.3IONS-SCNC: 13 MMOL/L (ref 3–16)
ANION GAP SERPL CALCULATED.3IONS-SCNC: 15 MMOL/L (ref 3–16)
ANION GAP SERPL CALCULATED.3IONS-SCNC: 8 MMOL/L (ref 3–16)
ANION GAP SERPL CALCULATED.3IONS-SCNC: 8 MMOL/L (ref 3–16)
ANION GAP SERPL CALCULATED.3IONS-SCNC: 9 MMOL/L (ref 3–16)
ANISOCYTOSIS: ABNORMAL
APTT: 134.5 SEC (ref 24.2–36.2)
APTT: 155.9 SEC (ref 24.2–36.2)
APTT: 30.7 SEC (ref 24.2–36.2)
APTT: 44.2 SEC (ref 24.2–36.2)
APTT: 46.3 SEC (ref 24.2–36.2)
APTT: 52 SEC (ref 24.2–36.2)
APTT: 55.6 SEC (ref 24.2–36.2)
APTT: 56.8 SEC (ref 24.2–36.2)
APTT: 59.6 SEC (ref 24.2–36.2)
APTT: 60.9 SEC (ref 24.2–36.2)
AST SERPL-CCNC: 22 U/L (ref 15–37)
AST SERPL-CCNC: 38 U/L (ref 15–37)
BANDED NEUTROPHILS RELATIVE PERCENT: 1 % (ref 0–7)
BASOPHILS ABSOLUTE: 0 K/UL (ref 0–0.2)
BASOPHILS RELATIVE PERCENT: 0 %
BASOPHILS RELATIVE PERCENT: 0.4 %
BASOPHILS RELATIVE PERCENT: 0.4 %
BILIRUB SERPL-MCNC: 0.3 MG/DL (ref 0–1)
BILIRUB SERPL-MCNC: 0.5 MG/DL (ref 0–1)
BILIRUBIN URINE: NEGATIVE
BLOOD, URINE: NEGATIVE
BUN BLDV-MCNC: 127 MG/DL (ref 7–20)
BUN BLDV-MCNC: 19 MG/DL (ref 7–20)
BUN BLDV-MCNC: 25 MG/DL (ref 7–20)
BUN BLDV-MCNC: 26 MG/DL (ref 7–20)
BUN BLDV-MCNC: 29 MG/DL (ref 7–20)
BUN BLDV-MCNC: 39 MG/DL (ref 7–20)
BUN BLDV-MCNC: 58 MG/DL (ref 7–20)
BUN BLDV-MCNC: 86 MG/DL (ref 7–20)
BUN BLDV-MCNC: 99 MG/DL (ref 7–20)
CALCIUM SERPL-MCNC: 10 MG/DL (ref 8.3–10.6)
CALCIUM SERPL-MCNC: 7.8 MG/DL (ref 8.3–10.6)
CALCIUM SERPL-MCNC: 7.9 MG/DL (ref 8.3–10.6)
CALCIUM SERPL-MCNC: 8.4 MG/DL (ref 8.3–10.6)
CALCIUM SERPL-MCNC: 8.8 MG/DL (ref 8.3–10.6)
CALCIUM SERPL-MCNC: 8.9 MG/DL (ref 8.3–10.6)
CALCIUM SERPL-MCNC: 9 MG/DL (ref 8.3–10.6)
CALCIUM SERPL-MCNC: 9 MG/DL (ref 8.3–10.6)
CALCIUM SERPL-MCNC: 9.8 MG/DL (ref 8.3–10.6)
CHLORIDE BLD-SCNC: 103 MMOL/L (ref 99–110)
CHLORIDE BLD-SCNC: 105 MMOL/L (ref 99–110)
CHLORIDE BLD-SCNC: 109 MMOL/L (ref 99–110)
CHLORIDE BLD-SCNC: 111 MMOL/L (ref 99–110)
CHLORIDE BLD-SCNC: 113 MMOL/L (ref 99–110)
CHLORIDE BLD-SCNC: 117 MMOL/L (ref 99–110)
CHLORIDE BLD-SCNC: 117 MMOL/L (ref 99–110)
CHLORIDE BLD-SCNC: 97 MMOL/L (ref 99–110)
CHLORIDE BLD-SCNC: 98 MMOL/L (ref 99–110)
CHOLESTEROL, TOTAL: 153 MG/DL (ref 0–199)
CLARITY: CLEAR
CO2: 22 MMOL/L (ref 21–32)
CO2: 24 MMOL/L (ref 21–32)
CO2: 25 MMOL/L (ref 21–32)
CO2: 26 MMOL/L (ref 21–32)
CO2: 27 MMOL/L (ref 21–32)
CO2: 27 MMOL/L (ref 21–32)
CO2: 29 MMOL/L (ref 21–32)
COLOR: YELLOW
CREAT SERPL-MCNC: 0.6 MG/DL (ref 0.8–1.3)
CREAT SERPL-MCNC: 0.7 MG/DL (ref 0.8–1.3)
CREAT SERPL-MCNC: 0.7 MG/DL (ref 0.8–1.3)
CREAT SERPL-MCNC: 0.8 MG/DL (ref 0.8–1.3)
CREAT SERPL-MCNC: 0.8 MG/DL (ref 0.8–1.3)
CREAT SERPL-MCNC: 0.9 MG/DL (ref 0.8–1.3)
CREAT SERPL-MCNC: 1.2 MG/DL (ref 0.8–1.3)
CREAT SERPL-MCNC: 1.2 MG/DL (ref 0.8–1.3)
CREAT SERPL-MCNC: 1.8 MG/DL (ref 0.8–1.3)
EKG ATRIAL RATE: 67 BPM
EKG DIAGNOSIS: NORMAL
EKG P AXIS: 73 DEGREES
EKG P-R INTERVAL: 156 MS
EKG Q-T INTERVAL: 460 MS
EKG QRS DURATION: 162 MS
EKG QTC CALCULATION (BAZETT): 486 MS
EKG R AXIS: 55 DEGREES
EKG T AXIS: 187 DEGREES
EKG VENTRICULAR RATE: 67 BPM
EOSINOPHILS ABSOLUTE: 0 K/UL (ref 0–0.6)
EOSINOPHILS RELATIVE PERCENT: 0 %
EOSINOPHILS RELATIVE PERCENT: 1.2 %
EOSINOPHILS RELATIVE PERCENT: 1.2 %
ESTIMATED AVERAGE GLUCOSE: 180 MG/DL
ESTIMATED AVERAGE GLUCOSE: 197.3 MG/DL
GFR AFRICAN AMERICAN: 44
GFR AFRICAN AMERICAN: >60
GFR NON-AFRICAN AMERICAN: 36
GFR NON-AFRICAN AMERICAN: 58
GFR NON-AFRICAN AMERICAN: 58
GFR NON-AFRICAN AMERICAN: >60
GLOBULIN: 3 G/DL
GLOBULIN: 3 G/DL
GLUCOSE BLD-MCNC: 100 MG/DL (ref 70–99)
GLUCOSE BLD-MCNC: 100 MG/DL (ref 70–99)
GLUCOSE BLD-MCNC: 107 MG/DL (ref 70–99)
GLUCOSE BLD-MCNC: 111 MG/DL (ref 70–99)
GLUCOSE BLD-MCNC: 112 MG/DL (ref 70–99)
GLUCOSE BLD-MCNC: 117 MG/DL (ref 70–99)
GLUCOSE BLD-MCNC: 121 MG/DL (ref 70–99)
GLUCOSE BLD-MCNC: 132 MG/DL (ref 70–99)
GLUCOSE BLD-MCNC: 141 MG/DL (ref 70–99)
GLUCOSE BLD-MCNC: 161 MG/DL (ref 70–99)
GLUCOSE BLD-MCNC: 163 MG/DL (ref 70–99)
GLUCOSE BLD-MCNC: 186 MG/DL (ref 70–99)
GLUCOSE BLD-MCNC: 198 MG/DL (ref 70–99)
GLUCOSE BLD-MCNC: 204 MG/DL (ref 70–99)
GLUCOSE BLD-MCNC: 234 MG/DL (ref 70–99)
GLUCOSE BLD-MCNC: 236 MG/DL (ref 70–99)
GLUCOSE BLD-MCNC: 243 MG/DL (ref 70–99)
GLUCOSE BLD-MCNC: 296 MG/DL (ref 70–99)
GLUCOSE BLD-MCNC: 326 MG/DL (ref 70–99)
GLUCOSE BLD-MCNC: 77 MG/DL (ref 70–99)
GLUCOSE BLD-MCNC: 79 MG/DL (ref 70–99)
GLUCOSE BLD-MCNC: 81 MG/DL (ref 70–99)
GLUCOSE BLD-MCNC: 95 MG/DL (ref 70–99)
GLUCOSE URINE: NEGATIVE MG/DL
HBA1C MFR BLD: 7.9 %
HBA1C MFR BLD: 8.5 %
HCT VFR BLD CALC: 31.4 % (ref 40.5–52.5)
HCT VFR BLD CALC: 34.3 % (ref 40.5–52.5)
HCT VFR BLD CALC: 43.1 % (ref 40.5–52.5)
HDLC SERPL-MCNC: 61 MG/DL (ref 40–60)
HEMOGLOBIN: 10.5 G/DL (ref 13.5–17.5)
HEMOGLOBIN: 11.1 G/DL (ref 13.5–17.5)
HEMOGLOBIN: 13.9 G/DL (ref 13.5–17.5)
INR BLD: 0.97 (ref 0.86–1.14)
KETONES, URINE: NEGATIVE MG/DL
LACTIC ACID: 0.9 MMOL/L (ref 0.4–2)
LACTIC ACID: 1 MMOL/L (ref 0.4–2)
LACTIC ACID: 1.7 MMOL/L (ref 0.4–2)
LACTIC ACID: 2.1 MMOL/L (ref 0.4–2)
LDL CHOLESTEROL CALCULATED: 75 MG/DL
LEUKOCYTE ESTERASE, URINE: NEGATIVE
LV EF: 28 %
LVEF MODALITY: NORMAL
LYMPHOCYTES ABSOLUTE: 1 K/UL (ref 1–5.1)
LYMPHOCYTES ABSOLUTE: 1.1 K/UL (ref 1–5.1)
LYMPHOCYTES ABSOLUTE: 1.1 K/UL (ref 1–5.1)
LYMPHOCYTES RELATIVE PERCENT: 21 %
LYMPHOCYTES RELATIVE PERCENT: 28.4 %
LYMPHOCYTES RELATIVE PERCENT: 29.9 %
MCH RBC QN AUTO: 30.8 PG (ref 26–34)
MCH RBC QN AUTO: 31 PG (ref 26–34)
MCH RBC QN AUTO: 32.3 PG (ref 26–34)
MCHC RBC AUTO-ENTMCNC: 32.3 G/DL (ref 31–36)
MCHC RBC AUTO-ENTMCNC: 32.4 G/DL (ref 31–36)
MCHC RBC AUTO-ENTMCNC: 33.5 G/DL (ref 31–36)
MCV RBC AUTO: 95.4 FL (ref 80–100)
MCV RBC AUTO: 95.6 FL (ref 80–100)
MCV RBC AUTO: 96.4 FL (ref 80–100)
MICROSCOPIC EXAMINATION: NORMAL
MONOCYTES ABSOLUTE: 0.3 K/UL (ref 0–1.3)
MONOCYTES ABSOLUTE: 0.3 K/UL (ref 0–1.3)
MONOCYTES ABSOLUTE: 0.4 K/UL (ref 0–1.3)
MONOCYTES RELATIVE PERCENT: 10.4 %
MONOCYTES RELATIVE PERCENT: 5 %
MONOCYTES RELATIVE PERCENT: 9.2 %
NEUTROPHILS ABSOLUTE: 2.2 K/UL (ref 1.7–7.7)
NEUTROPHILS ABSOLUTE: 2.2 K/UL (ref 1.7–7.7)
NEUTROPHILS ABSOLUTE: 4 K/UL (ref 1.7–7.7)
NEUTROPHILS RELATIVE PERCENT: 59.3 %
NEUTROPHILS RELATIVE PERCENT: 59.6 %
NEUTROPHILS RELATIVE PERCENT: 73 %
NITRITE, URINE: NEGATIVE
OVALOCYTES: ABNORMAL
PDW BLD-RTO: 13.9 % (ref 12.4–15.4)
PDW BLD-RTO: 14 % (ref 12.4–15.4)
PDW BLD-RTO: 14.4 % (ref 12.4–15.4)
PERFORMED ON: ABNORMAL
PERFORMED ON: NORMAL
PH UA: 5.5 (ref 5–8)
PHOSPHORUS: 1.5 MG/DL (ref 2.5–4.9)
PHOSPHORUS: 1.9 MG/DL (ref 2.5–4.9)
PHOSPHORUS: 2.5 MG/DL (ref 2.5–4.9)
PHOSPHORUS: 3.2 MG/DL (ref 2.5–4.9)
PLATELET # BLD: 123 K/UL (ref 135–450)
PLATELET # BLD: 145 K/UL (ref 135–450)
PLATELET # BLD: 223 K/UL (ref 135–450)
PLATELET SLIDE REVIEW: ADEQUATE
PMV BLD AUTO: 8.5 FL (ref 5–10.5)
PMV BLD AUTO: 8.7 FL (ref 5–10.5)
PMV BLD AUTO: 9.1 FL (ref 5–10.5)
POIKILOCYTES: ABNORMAL
POTASSIUM REFLEX MAGNESIUM: 4.9 MMOL/L (ref 3.5–5.1)
POTASSIUM SERPL-SCNC: 3.9 MMOL/L (ref 3.5–5.1)
POTASSIUM SERPL-SCNC: 3.9 MMOL/L (ref 3.5–5.1)
POTASSIUM SERPL-SCNC: 4 MMOL/L (ref 3.5–5.1)
POTASSIUM SERPL-SCNC: 4.3 MMOL/L (ref 3.5–5.1)
POTASSIUM SERPL-SCNC: 4.3 MMOL/L (ref 3.5–5.1)
POTASSIUM SERPL-SCNC: 4.4 MMOL/L (ref 3.5–5.1)
POTASSIUM SERPL-SCNC: 4.4 MMOL/L (ref 3.5–5.1)
POTASSIUM SERPL-SCNC: 5.1 MMOL/L (ref 3.5–5.1)
PRO-BNP: 1648 PG/ML (ref 0–449)
PRO-BNP: 3974 PG/ML (ref 0–449)
PROTEIN UA: NEGATIVE MG/DL
PROTHROMBIN TIME: 11.3 SEC (ref 10–13.2)
RBC # BLD: 3.25 M/UL (ref 4.2–5.9)
RBC # BLD: 3.59 M/UL (ref 4.2–5.9)
RBC # BLD: 4.52 M/UL (ref 4.2–5.9)
SARS-COV-2, NAA: NOT DETECTED
SLIDE REVIEW: ABNORMAL
SODIUM BLD-SCNC: 133 MMOL/L (ref 136–145)
SODIUM BLD-SCNC: 135 MMOL/L (ref 136–145)
SODIUM BLD-SCNC: 142 MMOL/L (ref 136–145)
SODIUM BLD-SCNC: 143 MMOL/L (ref 136–145)
SODIUM BLD-SCNC: 144 MMOL/L (ref 136–145)
SODIUM BLD-SCNC: 145 MMOL/L (ref 136–145)
SODIUM BLD-SCNC: 147 MMOL/L (ref 136–145)
SODIUM BLD-SCNC: 150 MMOL/L (ref 136–145)
SODIUM BLD-SCNC: 153 MMOL/L (ref 136–145)
SPECIFIC GRAVITY UA: 1.02 (ref 1–1.03)
TOTAL CK: 331 U/L (ref 39–308)
TOTAL CK: 340 U/L (ref 39–308)
TOTAL PROTEIN: 7.2 G/DL (ref 6.4–8.2)
TOTAL PROTEIN: 7.4 G/DL (ref 6.4–8.2)
TRIGL SERPL-MCNC: 87 MG/DL (ref 0–150)
TROPONIN: 0.92 NG/ML
TROPONIN: 0.96 NG/ML
TROPONIN: 1.02 NG/ML
TROPONIN: 1.24 NG/ML
URINE REFLEX TO CULTURE: NORMAL
URINE TYPE: NORMAL
UROBILINOGEN, URINE: 0.2 E.U./DL
VLDLC SERPL CALC-MCNC: 17 MG/DL
WBC # BLD: 3.7 K/UL (ref 4–11)
WBC # BLD: 3.7 K/UL (ref 4–11)
WBC # BLD: 5.4 K/UL (ref 4–11)

## 2020-01-01 PROCEDURE — 6370000000 HC RX 637 (ALT 250 FOR IP): Performed by: INTERNAL MEDICINE

## 2020-01-01 PROCEDURE — 1123F ACP DISCUSS/DSCN MKR DOCD: CPT | Performed by: NURSE PRACTITIONER

## 2020-01-01 PROCEDURE — 97530 THERAPEUTIC ACTIVITIES: CPT

## 2020-01-01 PROCEDURE — 80053 COMPREHEN METABOLIC PANEL: CPT

## 2020-01-01 PROCEDURE — 2580000003 HC RX 258: Performed by: PHYSICIAN ASSISTANT

## 2020-01-01 PROCEDURE — 97110 THERAPEUTIC EXERCISES: CPT

## 2020-01-01 PROCEDURE — 6360000002 HC RX W HCPCS: Performed by: INTERNAL MEDICINE

## 2020-01-01 PROCEDURE — 99239 HOSP IP/OBS DSCHRG MGMT >30: CPT | Performed by: INTERNAL MEDICINE

## 2020-01-01 PROCEDURE — 80048 BASIC METABOLIC PNL TOTAL CA: CPT

## 2020-01-01 PROCEDURE — 4004F PT TOBACCO SCREEN RCVD TLK: CPT | Performed by: NURSE PRACTITIONER

## 2020-01-01 PROCEDURE — 36415 COLL VENOUS BLD VENIPUNCTURE: CPT

## 2020-01-01 PROCEDURE — 99211 OFF/OP EST MAY X REQ PHY/QHP: CPT | Performed by: NURSE PRACTITIONER

## 2020-01-01 PROCEDURE — 97166 OT EVAL MOD COMPLEX 45 MIN: CPT

## 2020-01-01 PROCEDURE — 85730 THROMBOPLASTIN TIME PARTIAL: CPT

## 2020-01-01 PROCEDURE — 71045 X-RAY EXAM CHEST 1 VIEW: CPT

## 2020-01-01 PROCEDURE — 80069 RENAL FUNCTION PANEL: CPT

## 2020-01-01 PROCEDURE — 83880 ASSAY OF NATRIURETIC PEPTIDE: CPT

## 2020-01-01 PROCEDURE — 2580000003 HC RX 258: Performed by: INTERNAL MEDICINE

## 2020-01-01 PROCEDURE — G8420 CALC BMI NORM PARAMETERS: HCPCS | Performed by: NURSE PRACTITIONER

## 2020-01-01 PROCEDURE — 85025 COMPLETE CBC W/AUTO DIFF WBC: CPT

## 2020-01-01 PROCEDURE — 99232 SBSQ HOSP IP/OBS MODERATE 35: CPT | Performed by: INTERNAL MEDICINE

## 2020-01-01 PROCEDURE — 83036 HEMOGLOBIN GLYCOSYLATED A1C: CPT

## 2020-01-01 PROCEDURE — 80061 LIPID PANEL: CPT

## 2020-01-01 PROCEDURE — 3288F FALL RISK ASSESSMENT DOCD: CPT | Performed by: NURSE PRACTITIONER

## 2020-01-01 PROCEDURE — 99233 SBSQ HOSP IP/OBS HIGH 50: CPT | Performed by: INTERNAL MEDICINE

## 2020-01-01 PROCEDURE — 70450 CT HEAD/BRAIN W/O DYE: CPT

## 2020-01-01 PROCEDURE — 99214 OFFICE O/P EST MOD 30 MIN: CPT | Performed by: NURSE PRACTITIONER

## 2020-01-01 PROCEDURE — 94761 N-INVAS EAR/PLS OXIMETRY MLT: CPT

## 2020-01-01 PROCEDURE — 90694 VACC AIIV4 NO PRSRV 0.5ML IM: CPT | Performed by: NURSE PRACTITIONER

## 2020-01-01 PROCEDURE — 99285 EMERGENCY DEPT VISIT HI MDM: CPT

## 2020-01-01 PROCEDURE — 93010 ELECTROCARDIOGRAM REPORT: CPT | Performed by: INTERNAL MEDICINE

## 2020-01-01 PROCEDURE — G8427 DOCREV CUR MEDS BY ELIG CLIN: HCPCS | Performed by: NURSE PRACTITIONER

## 2020-01-01 PROCEDURE — 4040F PNEUMOC VAC/ADMIN/RCVD: CPT | Performed by: NURSE PRACTITIONER

## 2020-01-01 PROCEDURE — 3052F HG A1C>EQUAL 8.0%<EQUAL 9.0%: CPT | Performed by: NURSE PRACTITIONER

## 2020-01-01 PROCEDURE — 97112 NEUROMUSCULAR REEDUCATION: CPT

## 2020-01-01 PROCEDURE — 81003 URINALYSIS AUTO W/O SCOPE: CPT

## 2020-01-01 PROCEDURE — 99223 1ST HOSP IP/OBS HIGH 75: CPT | Performed by: INTERNAL MEDICINE

## 2020-01-01 PROCEDURE — 85610 PROTHROMBIN TIME: CPT

## 2020-01-01 PROCEDURE — 97535 SELF CARE MNGMENT TRAINING: CPT

## 2020-01-01 PROCEDURE — G8510 SCR DEP NEG, NO PLAN REQD: HCPCS | Performed by: NURSE PRACTITIONER

## 2020-01-01 PROCEDURE — 2060000000 HC ICU INTERMEDIATE R&B

## 2020-01-01 PROCEDURE — 93005 ELECTROCARDIOGRAM TRACING: CPT | Performed by: EMERGENCY MEDICINE

## 2020-01-01 PROCEDURE — G0008 ADMIN INFLUENZA VIRUS VAC: HCPCS | Performed by: NURSE PRACTITIONER

## 2020-01-01 PROCEDURE — 97116 GAIT TRAINING THERAPY: CPT

## 2020-01-01 PROCEDURE — 2580000003 HC RX 258: Performed by: EMERGENCY MEDICINE

## 2020-01-01 PROCEDURE — 84484 ASSAY OF TROPONIN QUANT: CPT

## 2020-01-01 PROCEDURE — 83605 ASSAY OF LACTIC ACID: CPT

## 2020-01-01 PROCEDURE — 93306 TTE W/DOPPLER COMPLETE: CPT

## 2020-01-01 PROCEDURE — 97162 PT EVAL MOD COMPLEX 30 MIN: CPT

## 2020-01-01 PROCEDURE — 82550 ASSAY OF CK (CPK): CPT

## 2020-01-01 PROCEDURE — G0438 PPPS, INITIAL VISIT: HCPCS | Performed by: NURSE PRACTITIONER

## 2020-01-01 RX ORDER — AZITHROMYCIN 250 MG/1
250 TABLET, FILM COATED ORAL SEE ADMIN INSTRUCTIONS
Qty: 6 TABLET | Refills: 0 | Status: SHIPPED | OUTPATIENT
Start: 2020-01-01 | End: 2020-01-01

## 2020-01-01 RX ORDER — HEPARIN SODIUM 1000 [USP'U]/ML
60 INJECTION, SOLUTION INTRAVENOUS; SUBCUTANEOUS PRN
Status: DISCONTINUED | OUTPATIENT
Start: 2020-01-01 | End: 2020-01-01 | Stop reason: ALTCHOICE

## 2020-01-01 RX ORDER — METOPROLOL SUCCINATE 25 MG/1
12.5 TABLET, EXTENDED RELEASE ORAL DAILY
Status: DISCONTINUED | OUTPATIENT
Start: 2020-01-01 | End: 2020-01-01 | Stop reason: HOSPADM

## 2020-01-01 RX ORDER — DEXTROSE MONOHYDRATE 50 MG/ML
100 INJECTION, SOLUTION INTRAVENOUS PRN
Status: DISCONTINUED | OUTPATIENT
Start: 2020-01-01 | End: 2020-01-01 | Stop reason: HOSPADM

## 2020-01-01 RX ORDER — POLYETHYLENE GLYCOL 3350 17 G/17G
17 POWDER, FOR SOLUTION ORAL DAILY PRN
Status: DISCONTINUED | OUTPATIENT
Start: 2020-01-01 | End: 2020-01-01 | Stop reason: HOSPADM

## 2020-01-01 RX ORDER — NICOTINE POLACRILEX 4 MG
15 LOZENGE BUCCAL PRN
Status: DISCONTINUED | OUTPATIENT
Start: 2020-01-01 | End: 2020-01-01 | Stop reason: HOSPADM

## 2020-01-01 RX ORDER — ASPIRIN 81 MG/1
81 TABLET, CHEWABLE ORAL DAILY
Status: DISCONTINUED | OUTPATIENT
Start: 2020-01-01 | End: 2020-01-01 | Stop reason: HOSPADM

## 2020-01-01 RX ORDER — DONEPEZIL HYDROCHLORIDE 5 MG/1
10 TABLET, FILM COATED ORAL NIGHTLY
Status: DISCONTINUED | OUTPATIENT
Start: 2020-01-01 | End: 2020-01-01 | Stop reason: HOSPADM

## 2020-01-01 RX ORDER — ASPIRIN 81 MG/1
81 TABLET, CHEWABLE ORAL DAILY
Qty: 30 TABLET | Refills: 0 | Status: SHIPPED | OUTPATIENT
Start: 2020-01-01

## 2020-01-01 RX ORDER — CLOPIDOGREL BISULFATE 75 MG/1
75 TABLET ORAL DAILY
Qty: 30 TABLET | Refills: 5 | Status: SHIPPED | OUTPATIENT
Start: 2020-01-01 | End: 2020-01-01

## 2020-01-01 RX ORDER — PROMETHAZINE HYDROCHLORIDE 25 MG/1
12.5 TABLET ORAL EVERY 6 HOURS PRN
Status: DISCONTINUED | OUTPATIENT
Start: 2020-01-01 | End: 2020-01-01

## 2020-01-01 RX ORDER — 0.9 % SODIUM CHLORIDE 0.9 %
30 INTRAVENOUS SOLUTION INTRAVENOUS ONCE
Status: COMPLETED | OUTPATIENT
Start: 2020-01-01 | End: 2020-01-01

## 2020-01-01 RX ORDER — SODIUM CHLORIDE 450 MG/100ML
INJECTION, SOLUTION INTRAVENOUS CONTINUOUS
Status: DISCONTINUED | OUTPATIENT
Start: 2020-01-01 | End: 2020-01-01

## 2020-01-01 RX ORDER — SODIUM CHLORIDE 0.9 % (FLUSH) 0.9 %
10 SYRINGE (ML) INJECTION PRN
Status: DISCONTINUED | OUTPATIENT
Start: 2020-01-01 | End: 2020-01-01 | Stop reason: HOSPADM

## 2020-01-01 RX ORDER — DEXTROSE MONOHYDRATE 25 G/50ML
12.5 INJECTION, SOLUTION INTRAVENOUS PRN
Status: DISCONTINUED | OUTPATIENT
Start: 2020-01-01 | End: 2020-01-01 | Stop reason: HOSPADM

## 2020-01-01 RX ORDER — PRAVASTATIN SODIUM 40 MG
TABLET ORAL
Qty: 90 TABLET | Refills: 0 | Status: SHIPPED | OUTPATIENT
Start: 2020-01-01 | End: 2021-01-01

## 2020-01-01 RX ORDER — FUROSEMIDE 20 MG/1
20 TABLET ORAL DAILY
COMMUNITY
Start: 2020-01-01

## 2020-01-01 RX ORDER — LISINOPRIL 5 MG/1
2.5 TABLET ORAL DAILY
Status: DISCONTINUED | OUTPATIENT
Start: 2020-01-01 | End: 2020-01-01 | Stop reason: HOSPADM

## 2020-01-01 RX ORDER — PRAVASTATIN SODIUM 40 MG
40 TABLET ORAL DAILY
Status: DISCONTINUED | OUTPATIENT
Start: 2020-01-01 | End: 2020-01-01 | Stop reason: HOSPADM

## 2020-01-01 RX ORDER — SODIUM CHLORIDE 9 MG/ML
INJECTION, SOLUTION INTRAVENOUS CONTINUOUS
Status: DISCONTINUED | OUTPATIENT
Start: 2020-01-01 | End: 2020-01-01

## 2020-01-01 RX ORDER — CLOPIDOGREL BISULFATE 75 MG/1
75 TABLET ORAL DAILY
Status: DISCONTINUED | OUTPATIENT
Start: 2020-01-01 | End: 2020-01-01 | Stop reason: HOSPADM

## 2020-01-01 RX ORDER — LISINOPRIL 2.5 MG/1
2.5 TABLET ORAL DAILY
Qty: 30 TABLET | Refills: 5 | Status: SHIPPED | OUTPATIENT
Start: 2020-01-01 | End: 2020-01-01

## 2020-01-01 RX ORDER — ACETAMINOPHEN 325 MG/1
650 TABLET ORAL EVERY 6 HOURS PRN
Status: DISCONTINUED | OUTPATIENT
Start: 2020-01-01 | End: 2020-01-01 | Stop reason: HOSPADM

## 2020-01-01 RX ORDER — LISINOPRIL 2.5 MG/1
2.5 TABLET ORAL DAILY
Qty: 30 TABLET | Refills: 0 | Status: SHIPPED | OUTPATIENT
Start: 2020-01-01 | End: 2020-01-01 | Stop reason: SDUPTHER

## 2020-01-01 RX ORDER — ONDANSETRON 2 MG/ML
4 INJECTION INTRAMUSCULAR; INTRAVENOUS EVERY 6 HOURS PRN
Status: DISCONTINUED | OUTPATIENT
Start: 2020-01-01 | End: 2020-01-01

## 2020-01-01 RX ORDER — CLOPIDOGREL BISULFATE 75 MG/1
TABLET ORAL
Qty: 30 TABLET | Refills: 0 | Status: SHIPPED | OUTPATIENT
Start: 2020-01-01 | End: 2020-01-01

## 2020-01-01 RX ORDER — HEPARIN SODIUM 1000 [USP'U]/ML
60 INJECTION, SOLUTION INTRAVENOUS; SUBCUTANEOUS ONCE
Status: COMPLETED | OUTPATIENT
Start: 2020-01-01 | End: 2020-01-01

## 2020-01-01 RX ORDER — ACETAMINOPHEN 650 MG/1
650 SUPPOSITORY RECTAL EVERY 6 HOURS PRN
Status: DISCONTINUED | OUTPATIENT
Start: 2020-01-01 | End: 2020-01-01 | Stop reason: HOSPADM

## 2020-01-01 RX ORDER — HEPARIN SODIUM 10000 [USP'U]/100ML
12 INJECTION, SOLUTION INTRAVENOUS CONTINUOUS
Status: DISCONTINUED | OUTPATIENT
Start: 2020-01-01 | End: 2020-01-01

## 2020-01-01 RX ORDER — HEPARIN SODIUM 1000 [USP'U]/ML
30 INJECTION, SOLUTION INTRAVENOUS; SUBCUTANEOUS PRN
Status: DISCONTINUED | OUTPATIENT
Start: 2020-01-01 | End: 2020-01-01 | Stop reason: ALTCHOICE

## 2020-01-01 RX ORDER — LISINOPRIL 2.5 MG/1
TABLET ORAL
Qty: 30 TABLET | Refills: 0 | Status: SHIPPED | OUTPATIENT
Start: 2020-01-01 | End: 2020-01-01

## 2020-01-01 RX ORDER — HEPARIN SODIUM 10000 [USP'U]/100ML
5.1 INJECTION, SOLUTION INTRAVENOUS CONTINUOUS
Status: DISCONTINUED | OUTPATIENT
Start: 2020-01-01 | End: 2020-01-01 | Stop reason: ALTCHOICE

## 2020-01-01 RX ORDER — CLOPIDOGREL BISULFATE 75 MG/1
75 TABLET ORAL DAILY
Qty: 30 TABLET | Refills: 0 | Status: SHIPPED | OUTPATIENT
Start: 2020-01-01 | End: 2020-01-01 | Stop reason: SDUPTHER

## 2020-01-01 RX ORDER — SODIUM CHLORIDE 0.9 % (FLUSH) 0.9 %
10 SYRINGE (ML) INJECTION EVERY 12 HOURS SCHEDULED
Status: DISCONTINUED | OUTPATIENT
Start: 2020-01-01 | End: 2020-01-01 | Stop reason: HOSPADM

## 2020-01-01 RX ORDER — PROCHLORPERAZINE EDISYLATE 5 MG/ML
10 INJECTION INTRAMUSCULAR; INTRAVENOUS EVERY 6 HOURS PRN
Status: DISCONTINUED | OUTPATIENT
Start: 2020-01-01 | End: 2020-01-01 | Stop reason: HOSPADM

## 2020-01-01 RX ORDER — POTASSIUM CHLORIDE 750 MG/1
10 TABLET, EXTENDED RELEASE ORAL 2 TIMES DAILY
Status: ON HOLD | COMMUNITY
Start: 2020-01-01 | End: 2021-01-01 | Stop reason: HOSPADM

## 2020-01-01 RX ORDER — METOPROLOL SUCCINATE 25 MG/1
12.5 TABLET, EXTENDED RELEASE ORAL DAILY
Qty: 30 TABLET | Refills: 3 | Status: SHIPPED | OUTPATIENT
Start: 2020-01-01

## 2020-01-01 RX ADMIN — CLOPIDOGREL BISULFATE 75 MG: 75 TABLET ORAL at 08:56

## 2020-01-01 RX ADMIN — CARBIDOPA AND LEVODOPA 1.5 TABLET: 25; 100 TABLET ORAL at 14:58

## 2020-01-01 RX ADMIN — INSULIN LISPRO 4 UNITS: 100 INJECTION, SOLUTION INTRAVENOUS; SUBCUTANEOUS at 21:47

## 2020-01-01 RX ADMIN — SODIUM CHLORIDE: 4.5 INJECTION, SOLUTION INTRAVENOUS at 02:43

## 2020-01-01 RX ADMIN — CARBIDOPA AND LEVODOPA 1.5 TABLET: 25; 100 TABLET ORAL at 12:43

## 2020-01-01 RX ADMIN — SODIUM CHLORIDE 1893 ML: 9 INJECTION, SOLUTION INTRAVENOUS at 23:33

## 2020-01-01 RX ADMIN — SODIUM CHLORIDE: 9 INJECTION, SOLUTION INTRAVENOUS at 03:44

## 2020-01-01 RX ADMIN — METOPROLOL SUCCINATE 12.5 MG: 25 TABLET, EXTENDED RELEASE ORAL at 12:02

## 2020-01-01 RX ADMIN — SODIUM CHLORIDE: 4.5 INJECTION, SOLUTION INTRAVENOUS at 17:17

## 2020-01-01 RX ADMIN — Medication 10 ML: at 21:44

## 2020-01-01 RX ADMIN — INSULIN LISPRO 4 UNITS: 100 INJECTION, SOLUTION INTRAVENOUS; SUBCUTANEOUS at 12:43

## 2020-01-01 RX ADMIN — LISINOPRIL 2.5 MG: 5 TABLET ORAL at 12:43

## 2020-01-01 RX ADMIN — INSULIN LISPRO 1 UNITS: 100 INJECTION, SOLUTION INTRAVENOUS; SUBCUTANEOUS at 20:39

## 2020-01-01 RX ADMIN — INSULIN LISPRO 4 UNITS: 100 INJECTION, SOLUTION INTRAVENOUS; SUBCUTANEOUS at 12:03

## 2020-01-01 RX ADMIN — SODIUM CHLORIDE: 4.5 INJECTION, SOLUTION INTRAVENOUS at 06:41

## 2020-01-01 RX ADMIN — PRAVASTATIN SODIUM 40 MG: 40 TABLET ORAL at 09:16

## 2020-01-01 RX ADMIN — HEPARIN SODIUM 3790 UNITS: 1000 INJECTION, SOLUTION INTRAVENOUS; SUBCUTANEOUS at 03:56

## 2020-01-01 RX ADMIN — ASPIRIN 81 MG 81 MG: 81 TABLET ORAL at 08:56

## 2020-01-01 RX ADMIN — DONEPEZIL HYDROCHLORIDE 10 MG: 5 TABLET, FILM COATED ORAL at 20:36

## 2020-01-01 RX ADMIN — CARBIDOPA AND LEVODOPA 1.5 TABLET: 25; 100 TABLET ORAL at 15:56

## 2020-01-01 RX ADMIN — Medication 10 ML: at 09:16

## 2020-01-01 RX ADMIN — DONEPEZIL HYDROCHLORIDE 10 MG: 5 TABLET, FILM COATED ORAL at 21:44

## 2020-01-01 RX ADMIN — INSULIN LISPRO 4 UNITS: 100 INJECTION, SOLUTION INTRAVENOUS; SUBCUTANEOUS at 12:53

## 2020-01-01 RX ADMIN — Medication 10 ML: at 08:56

## 2020-01-01 RX ADMIN — Medication 10 ML: at 10:48

## 2020-01-01 RX ADMIN — CARBIDOPA AND LEVODOPA 1.5 TABLET: 25; 100 TABLET ORAL at 10:42

## 2020-01-01 RX ADMIN — CARBIDOPA AND LEVODOPA 1.5 TABLET: 25; 100 TABLET ORAL at 09:17

## 2020-01-01 RX ADMIN — CARBIDOPA AND LEVODOPA 1.5 TABLET: 25; 100 TABLET ORAL at 09:31

## 2020-01-01 RX ADMIN — CLOPIDOGREL BISULFATE 75 MG: 75 TABLET ORAL at 09:30

## 2020-01-01 RX ADMIN — INSULIN LISPRO 2 UNITS: 100 INJECTION, SOLUTION INTRAVENOUS; SUBCUTANEOUS at 12:19

## 2020-01-01 RX ADMIN — ASPIRIN 81 MG 81 MG: 81 TABLET ORAL at 09:31

## 2020-01-01 RX ADMIN — HEPARIN SODIUM AND DEXTROSE 3.8 ML/HR: 10000; 5 INJECTION INTRAVENOUS at 13:58

## 2020-01-01 RX ADMIN — SODIUM CHLORIDE: 4.5 INJECTION, SOLUTION INTRAVENOUS at 12:46

## 2020-01-01 RX ADMIN — Medication 10 ML: at 20:36

## 2020-01-01 RX ADMIN — INSULIN LISPRO 1 UNITS: 100 INJECTION, SOLUTION INTRAVENOUS; SUBCUTANEOUS at 21:44

## 2020-01-01 RX ADMIN — ASPIRIN 81 MG 81 MG: 81 TABLET ORAL at 10:42

## 2020-01-01 RX ADMIN — PRAVASTATIN SODIUM 40 MG: 40 TABLET ORAL at 08:56

## 2020-01-01 RX ADMIN — Medication 10 ML: at 09:31

## 2020-01-01 RX ADMIN — CARBIDOPA AND LEVODOPA 1.5 TABLET: 25; 100 TABLET ORAL at 20:36

## 2020-01-01 RX ADMIN — INSULIN LISPRO 4 UNITS: 100 INJECTION, SOLUTION INTRAVENOUS; SUBCUTANEOUS at 17:10

## 2020-01-01 RX ADMIN — CARBIDOPA AND LEVODOPA 1.5 TABLET: 25; 100 TABLET ORAL at 21:44

## 2020-01-01 RX ADMIN — LISINOPRIL 2.5 MG: 5 TABLET ORAL at 12:19

## 2020-01-01 RX ADMIN — CARBIDOPA AND LEVODOPA 1.5 TABLET: 25; 100 TABLET ORAL at 08:55

## 2020-01-01 RX ADMIN — METOPROLOL SUCCINATE 12.5 MG: 25 TABLET, EXTENDED RELEASE ORAL at 08:56

## 2020-01-01 RX ADMIN — PRAVASTATIN SODIUM 40 MG: 40 TABLET ORAL at 09:31

## 2020-01-01 RX ADMIN — CARBIDOPA AND LEVODOPA 1.5 TABLET: 25; 100 TABLET ORAL at 15:40

## 2020-01-01 RX ADMIN — METOPROLOL SUCCINATE 12.5 MG: 25 TABLET, EXTENDED RELEASE ORAL at 09:31

## 2020-01-01 RX ADMIN — HEPARIN SODIUM AND DEXTROSE 12 UNITS/KG/HR: 10000; 5 INJECTION INTRAVENOUS at 03:56

## 2020-01-01 RX ADMIN — SODIUM CHLORIDE: 4.5 INJECTION, SOLUTION INTRAVENOUS at 15:39

## 2020-01-01 RX ADMIN — HEPARIN SODIUM AND DEXTROSE 5.1 ML/HR: 10000; 5 INJECTION INTRAVENOUS at 22:02

## 2020-01-01 RX ADMIN — ASPIRIN 81 MG 81 MG: 81 TABLET ORAL at 09:16

## 2020-01-01 RX ADMIN — POLYETHYLENE GLYCOL (3350) 17 G: 17 POWDER, FOR SOLUTION ORAL at 09:17

## 2020-01-01 RX ADMIN — PRAVASTATIN SODIUM 40 MG: 40 TABLET ORAL at 10:42

## 2020-01-01 ASSESSMENT — ENCOUNTER SYMPTOMS
VOMITING: 0
DIARRHEA: 0
BACK PAIN: 1
SORE THROAT: 0
NAUSEA: 0
SORE THROAT: 0
SINUS PRESSURE: 0
COUGH: 0
VOMITING: 0
FACIAL SWELLING: 0
COUGH: 0
NAUSEA: 0
FACIAL SWELLING: 0
SHORTNESS OF BREATH: 1
DIARRHEA: 0
SINUS PRESSURE: 0

## 2020-01-01 ASSESSMENT — PATIENT HEALTH QUESTIONNAIRE - PHQ9
1. LITTLE INTEREST OR PLEASURE IN DOING THINGS: 0
SUM OF ALL RESPONSES TO PHQ QUESTIONS 1-9: 2
SUM OF ALL RESPONSES TO PHQ QUESTIONS 1-9: 0
SUM OF ALL RESPONSES TO PHQ QUESTIONS 1-9: 2
2. FEELING DOWN, DEPRESSED OR HOPELESS: 0
SUM OF ALL RESPONSES TO PHQ9 QUESTIONS 1 & 2: 0
SUM OF ALL RESPONSES TO PHQ QUESTIONS 1-9: 0

## 2020-01-01 ASSESSMENT — LIFESTYLE VARIABLES: HOW OFTEN DO YOU HAVE A DRINK CONTAINING ALCOHOL: 0

## 2020-01-01 ASSESSMENT — PAIN SCALES - GENERAL: PAINLEVEL_OUTOF10: 0

## 2020-01-06 ENCOUNTER — TELEPHONE (OUTPATIENT)
Dept: INTERNAL MEDICINE CLINIC | Age: 85
End: 2020-01-06

## 2020-02-05 ENCOUNTER — OFFICE VISIT (OUTPATIENT)
Dept: INTERNAL MEDICINE CLINIC | Age: 85
End: 2020-02-05
Payer: MEDICARE

## 2020-02-05 VITALS
DIASTOLIC BLOOD PRESSURE: 86 MMHG | BODY MASS INDEX: 18.85 KG/M2 | HEART RATE: 106 BPM | SYSTOLIC BLOOD PRESSURE: 144 MMHG | WEIGHT: 139 LBS | OXYGEN SATURATION: 97 %

## 2020-02-05 LAB — HBA1C MFR BLD: 9.7 %

## 2020-02-05 PROCEDURE — G8420 CALC BMI NORM PARAMETERS: HCPCS | Performed by: NURSE PRACTITIONER

## 2020-02-05 PROCEDURE — G8427 DOCREV CUR MEDS BY ELIG CLIN: HCPCS | Performed by: NURSE PRACTITIONER

## 2020-02-05 PROCEDURE — 1036F TOBACCO NON-USER: CPT | Performed by: NURSE PRACTITIONER

## 2020-02-05 PROCEDURE — 4040F PNEUMOC VAC/ADMIN/RCVD: CPT | Performed by: NURSE PRACTITIONER

## 2020-02-05 PROCEDURE — 99214 OFFICE O/P EST MOD 30 MIN: CPT | Performed by: NURSE PRACTITIONER

## 2020-02-05 PROCEDURE — 1123F ACP DISCUSS/DSCN MKR DOCD: CPT | Performed by: NURSE PRACTITIONER

## 2020-02-05 PROCEDURE — G8482 FLU IMMUNIZE ORDER/ADMIN: HCPCS | Performed by: NURSE PRACTITIONER

## 2020-02-05 PROCEDURE — 83036 HEMOGLOBIN GLYCOSYLATED A1C: CPT | Performed by: NURSE PRACTITIONER

## 2020-02-05 RX ORDER — LISINOPRIL AND HYDROCHLOROTHIAZIDE 20; 12.5 MG/1; MG/1
2 TABLET ORAL DAILY
Qty: 60 TABLET | Refills: 5 | Status: SHIPPED | OUTPATIENT
Start: 2020-02-05 | End: 2020-03-16

## 2020-02-05 ASSESSMENT — PATIENT HEALTH QUESTIONNAIRE - PHQ9
SUM OF ALL RESPONSES TO PHQ9 QUESTIONS 1 & 2: 0
1. LITTLE INTEREST OR PLEASURE IN DOING THINGS: 0
2. FEELING DOWN, DEPRESSED OR HOPELESS: 0
SUM OF ALL RESPONSES TO PHQ QUESTIONS 1-9: 0
SUM OF ALL RESPONSES TO PHQ QUESTIONS 1-9: 0

## 2020-02-05 NOTE — PROGRESS NOTES
Torri Sender  1935      HPI:  Chief Complaint   Patient presents with    Diabetes     History from patient is poor d/t memory loss and confusion. Son-in-law present.      Son-in-law states that patient is sleeping at their home every night and then some days they will take him back to patient's home (next door) to stay for the day. This is getting harder and harder on patient so they find patient staying at daughter's home more often. A relative checks on him and he eats breakfast and dinner with relatives. He is doing a much better job eating and drinking, enjoying desserts. New caretaker helps pt get showers and dressed. She is also helping with laundry and various ADLs.    Pt's memory continues to worsen. Pt's children are caring for medications and confirming pt is taking medicine.     CT neck showed questionable nonspecific sclerotic lesions in posterior aspect of C7, T1 and T2. Could not exclude metastatic disease. Pt at the time refused bone scan but since then he has tried performing bone scan but d/t body stature radiology was unable to perform. WBC 2.8 12/2018.   Had followed up with hem/onc who stated f/u if any further symptoms.      Podiatrist - f/u 3/2019 - no concerns. Trimmed nails and cleansed feet.      A1c 9.7 <-- 7.9. Not checking BS. Weight is up. Eating consistently but has increased desserts. No medication for DM at this time. Cardiac pacemaker, CAD, Dr Suzette Batista manages yearly f/u with Dr Brooks Buerger at [facility].     BP (!) 144/86 (Site: Right Upper Arm, Position: Sitting, Cuff Size: Large Adult)   Pulse 106   Wt 139 lb (63 kg)   SpO2 97%   BMI 18.85 kg/m²     Prior to Visit Medications    Medication Sig Taking?  Authorizing Provider   metFORMIN (GLUCOPHAGE) 500 MG tablet Take 1 tablet by mouth 2 times daily (with meals) Increase in 4 weeks to 2 pills twice daily (with meals) Yes Melvyn Harada, APRN - CNP   lisinopril-hydrochlorothiazide (PRINZIDE;ZESTORETIC) 20-12.5 MG per tablet on file   Lifestyle    Physical activity:     Days per week: Not on file     Minutes per session: Not on file    Stress: Not on file   Relationships    Social connections:     Talks on phone: Not on file     Gets together: Not on file     Attends Scientologist service: Not on file     Active member of club or organization: Not on file     Attends meetings of clubs or organizations: Not on file     Relationship status: Not on file    Intimate partner violence:     Fear of current or ex partner: Not on file     Emotionally abused: Not on file     Physically abused: Not on file     Forced sexual activity: Not on file   Other Topics Concern    Not on file   Social History Narrative    Not on file       Review of Systems   Constitutional: Negative for appetite change, chills, fatigue, fever and unexpected weight change. HENT: Negative for congestion, ear discharge, ear pain, facial swelling, hearing loss, sinus pressure, sneezing and sore throat. Respiratory: Negative for cough. Cardiovascular: Negative for chest pain. Gastrointestinal: Negative for diarrhea, nausea and vomiting. Genitourinary: Negative for difficulty urinating, dysuria, hematuria and urgency. Musculoskeletal: Negative for arthralgias and gait problem. Neurological: Negative for dizziness, weakness and headaches. Hematological: Negative for adenopathy. Psychiatric/Behavioral: Positive for confusion. Negative for sleep disturbance and suicidal ideas. Dementia       Physical Exam  Constitutional:       Appearance: He is normal weight. HENT:      Head: Normocephalic. Right Ear: Tympanic membrane, ear canal and external ear normal.      Left Ear: Tympanic membrane, ear canal and external ear normal.   Neck:      Vascular: No carotid bruit. Cardiovascular:      Rate and Rhythm: Normal rate and regular rhythm. Pulses: Normal pulses. Heart sounds: Normal heart sounds.    Pulmonary:      Effort: Pulmonary effort is normal.      Breath sounds: Normal breath sounds. Abdominal:      General: Bowel sounds are normal.      Tenderness: There is no abdominal tenderness. Musculoskeletal:      Right lower leg: No edema. Left lower leg: No edema. Neurological:      General: No focal deficit present. Mental Status: He is alert. Psychiatric:         Mood and Affect: Mood normal.      Comments: Pt oriented to person, place but not time         Assessment:     1. Type 2 diabetes mellitus without complication, without long-term current use of insulin ([facility])  A1c 9.7  Add Metformin 500mg twice daily and increase in 4 weeks to 1000mg twice daily  Reviewed following serving size of dessert and limit chocolate milk  Check BS and keep log to bring to next appt    - POCT glycosylated hemoglobin (Hb A1C)  - metFORMIN (GLUCOPHAGE) 500 MG tablet; Take 1 tablet by mouth 2 times daily (with meals) Increase in 4 weeks to 2 pills twice daily (with meals)  Dispense: 120 tablet; Refill: 5    2. Essential hypertension  Increase Lisinopril from 20mg daily to 40mg daily, continue HCTZ 25mg daily  Monitor BP at home    - lisinopril-hydrochlorothiazide (PRINZIDE;ZESTORETIC) 20-12.5 MG per tablet; Take 2 tablets by mouth daily  Dispense: 60 tablet; Refill: 5    3. Cognitive impairment  Monitor closely. Cont Aricept  Enc caretaker to continue to have him leave the home for grocery store runs etc       4. Mixed hyperlipidemia  Monitor, cont Pravastatin    5. Benign prostatic hyperplasia with urinary frequency  Monitor, cont Flomax    6. Leukopenia, unspecified type  Monitor    7. Pacemaker  F/u Cardiology, stable        Plan:    See above plan. Return in about 3 months (around 5/5/2020) for 30 min appt, DM, A1C, dementia.     Mikal Chang, APRN - CNP

## 2020-02-06 ASSESSMENT — ENCOUNTER SYMPTOMS
FACIAL SWELLING: 0
DIARRHEA: 0
COUGH: 0
NAUSEA: 0
VOMITING: 0
SINUS PRESSURE: 0
SORE THROAT: 0

## 2020-02-11 ENCOUNTER — TELEPHONE (OUTPATIENT)
Dept: INTERNAL MEDICINE CLINIC | Age: 85
End: 2020-02-11

## 2020-02-11 NOTE — TELEPHONE ENCOUNTER
Patient's daughter called. They are concerned that his BS before lunch was 303. His morning FBS was 196 .    He is taking the metformin BID

## 2020-02-21 ENCOUNTER — TELEPHONE (OUTPATIENT)
Dept: INTERNAL MEDICINE CLINIC | Age: 85
End: 2020-02-21

## 2020-02-21 RX ORDER — GLUCOSAMINE HCL/CHONDROITIN SU 500-400 MG
CAPSULE ORAL
Qty: 100 STRIP | Refills: 5 | Status: ON HOLD
Start: 2020-02-21 | End: 2020-01-01 | Stop reason: HOSPADM

## 2020-02-21 NOTE — TELEPHONE ENCOUNTER
Refill request for accu check guide test strip  medication. Name of [facility]    Last visit - 2-5-20     Pending visit - 5-8-20    Last refill -3-13-19    Medication Contract signed -   Bradley lama-   Additional Comments needs different type of strip.

## 2020-02-24 RX ORDER — GLUCOSAMINE HCL/CHONDROITIN SU 500-400 MG
CAPSULE ORAL
Qty: 300 STRIP | Refills: 1 | Status: ON HOLD
Start: 2020-02-24 | End: 2020-01-01 | Stop reason: HOSPADM

## 2020-03-16 RX ORDER — PRAVASTATIN SODIUM 40 MG
TABLET ORAL
Qty: 90 TABLET | Refills: 3 | Status: SHIPPED | OUTPATIENT
Start: 2020-03-16 | End: 2020-01-01

## 2020-03-16 NOTE — TELEPHONE ENCOUNTER
Refill request for  medication.      Pravastatin 10/14/19    Name of [facility] [address] visit - 2/5/20     Pending visit - 5/8/20    Last refill -10/14/19

## 2020-04-07 ENCOUNTER — TELEMEDICINE (OUTPATIENT)
Dept: INTERNAL MEDICINE CLINIC | Age: 85
End: 2020-04-07
Payer: MEDICARE

## 2020-04-07 PROCEDURE — 99213 OFFICE O/P EST LOW 20 MIN: CPT | Performed by: NURSE PRACTITIONER

## 2020-04-07 PROCEDURE — 1123F ACP DISCUSS/DSCN MKR DOCD: CPT | Performed by: NURSE PRACTITIONER

## 2020-04-07 PROCEDURE — 4040F PNEUMOC VAC/ADMIN/RCVD: CPT | Performed by: NURSE PRACTITIONER

## 2020-04-07 PROCEDURE — G8427 DOCREV CUR MEDS BY ELIG CLIN: HCPCS | Performed by: NURSE PRACTITIONER

## 2020-04-07 RX ORDER — SULFAMETHOXAZOLE AND TRIMETHOPRIM 800; 160 MG/1; MG/1
1 TABLET ORAL 2 TIMES DAILY
Qty: 14 TABLET | Refills: 0 | Status: SHIPPED | OUTPATIENT
Start: 2020-04-07 | End: 2020-04-14

## 2020-04-07 ASSESSMENT — ENCOUNTER SYMPTOMS
COUGH: 0
NAUSEA: 0
DIARRHEA: 0
SINUS PAIN: 0
CHEST TIGHTNESS: 0
CONSTIPATION: 0
SHORTNESS OF BREATH: 0
VOMITING: 0
SORE THROAT: 0

## 2020-04-07 NOTE — PROGRESS NOTES
Heart rate-    Respiratory rate-    Temperature-  Pulse oximetry-     Constitutional: [x] Appears well-developed and well-nourished [x] No apparent distress      [] Abnormal-   Mental status  [x] Alert and awake  [x] Oriented to person/place/time [x]Able to follow commands      Eyes:  EOM    [x]  Normal  [] Abnormal-  Sclera  []  Normal  [] Abnormal -         Discharge []  None visible  [] Abnormal -    HENT:   [x] Normocephalic, atraumatic. [] Abnormal   [x] Mouth/Throat: Mucous membranes are moist.     External Ears [x] Normal  [] Abnormal-     Neck: [x] No visualized mass     Pulmonary/Chest: [x] Respiratory effort normal.  [x] No visualized signs of difficulty breathing or respiratory distress        [] Abnormal-      Musculoskeletal:   [] Normal gait with no signs of ataxia         [] Normal range of motion of neck        [] Abnormal-       Neurological:        [] No Facial Asymmetry (Cranial nerve 7 motor function) (limited exam to video visit)          [] No gaze palsy        [] Abnormal-         Skin:        [x] No significant exanthematous lesions or discoloration noted on facial skin         [] Abnormal-            Psychiatric:       [x] Normal Affect [x] No Hallucinations        [] Abnormal-     Other pertinent observable physical exam findings-     ASSESSMENT/PLAN:  1. Urinary frequency    - sulfamethoxazole-trimethoprim (BACTRIM DS;SEPTRA DS) 800-160 MG per tablet; Take 1 tablet by mouth 2 times daily for 7 days  Dispense: 14 tablet; Refill: 0    Trial of abx to cover for suspected UTI vs cystitis vs prostatitis given symptoms. BG stated to be controlled throughout the day so less likely a possibility of hyperglycemia causing his frequency. Encouraged to increase hydration to flush the urinary tract. Can use ibuprofen/tylenol for additional symptom relief. Encouraged proper genital hygiene to prevent further UTI.  Will need urine specimen if symptoms persist.     Return if symptoms worsen or fail to

## 2020-04-20 NOTE — PROGRESS NOTES
unable to walk long distances, dx parkinsons, chronic lower back pain, cognitive impairment Yes TEMITOPE Green CNP   lisinopril-hydroCHLOROthiazide (PRINZIDE;ZESTORETIC) 20-12.5 MG per tablet Take 2 tablets by mouth once daily Yes TEMITOPE Green CNP   donepezil (ARICEPT) 10 MG tablet TAKE 1 TABLET BY MOUTH NIGHTLY FOR MEMORY Yes TEMITOPE Green CNP   tamsulosin (FLOMAX) 0.4 MG capsule Take 1 capsule by mouth once daily Yes TEMITOPE Green CNP   pravastatin (PRAVACHOL) 40 MG tablet TAKE 1 TABLET BY MOUTH ONCE DAILY Yes TEMITOPE Green CNP   blood glucose monitor strips Test one times a day & as needed for symptoms of irregular blood glucose. Please give whichever test strips are approved by insurance Yes TEMITOPE Benitez CNP   blood glucose monitor strips Test 1 times a day & as needed for symptoms of irregular blood glucose. PLEASE DISPENSE :  ACCU CHECK GUIDE TEST STRIPS. Dx: E11.19 Yes TEMITOPE Benitez CNP   blood glucose test strips (ASCENSIA AUTODISC VI;ONE TOUCH ULTRA TEST VI) strip 1 each by In Vitro route daily As needed. Yes TEMITOPE De Luna CNP   metFORMIN (GLUCOPHAGE) 500 MG tablet Take 1 tablet by mouth 2 times daily (with meals) Increase in 4 weeks to 2 pills twice daily (with meals) Yes TEMITOPE De Luna CNP   blood glucose monitor kit and supplies Test one time a day & as needed for symptoms of irregular blood glucose. Please give whichever meter kit is allowed by insurance. Yes Ollie Lanes, APRN - CNP   ONE TOUCH LANCETS MISC 1 each by Does not apply route daily Yes Ollie Lanes, APRN - CNP   carbidopa-levodopa (SINEMET)  MG per tablet  Yes Historical Provider, MD   therapeutic multivitamin-minerals (THERAGRAN-M) tablet Take 1 tablet by mouth daily.  Yes Historical Provider, MD       Social History     Tobacco Use    Smoking status: Former Smoker     Types: Cigarettes, Pipe, Cigars     Last attempt to quit: 1956     Years since quittin.3    Musculoskeletal:   [] Normal gait with no signs of ataxia         [] Normal range of motion of neck        [x] Abnormal- Shuffling walk    Neurological:        [x] No Facial Asymmetry (Cranial nerve 7 motor function) (limited exam to video visit)          [] No gaze palsy        [] Abnormal-         Skin:        [x] No significant exanthematous lesions or discoloration noted on facial skin         [] Abnormal-            Psychiatric:       [] Normal Affect [] No Hallucinations        [] Abnormal-   2 inch x 3 inch indurated nodule over left lateral aspect of abdomen/hip. Non tender. No erythema. No warmth. Other pertinent observable physical exam findings-     ASSESSMENT/PLAN:  1. Cognitive impairment  Monitor, stable. Maintain medication    2. Syncope, unspecified syncope type  Suspect s/t weight loss, poor diet and hypotension (recent increase in medication)  Will decrease BP medication and increase protein shakes     3. Type 2 diabetes mellitus without complication, without long-term current use of insulin (HCC)  Monitor. No changes at this time. Monitor BS consistently    4. Parkinson disease ([facility] [address]  Monitor    5. Essential hypertension  Decrease BP medication from 2 pills daily to 1 pill daily  Increase diet intake      6. Chronic bilateral low back pain without sciatica  Monitor    7. Skin nodule  Monitor as pt denies getting imaging or testing at this time. Suspect lipoma vs sebaceous cyst          Return in about 8 weeks (around 6/15/2020) for VV 30 min appt. Rob Tinoco is a 80 y.o. male being evaluated by a Virtual Visit (video visit) encounter to address concerns as mentioned above. A caregiver was present when appropriate. Due to this being a TeleHealth encounter (During AGEPI-30 public health emergency), evaluation of the following organ systems was limited: Vitals/Constitutional/EENT/Resp/CV/GI//MS/Neuro/Skin/Heme-Lymph-Imm.   Pursuant to the emergency declaration under the [facility] Act and the [address] waiver authority and the [employer] and [employer] Act, this Virtual Visit was conducted with patient's (and/or legal guardian's) consent, to reduce the patient's risk of exposure to COVID-19 and provide necessary medical care. The patient (and/or legal guardian) has also been advised to contact this office for worsening conditions or problems, and seek emergency medical treatment and/or call 911 if deemed necessary. Services were provided through a video synchronous discussion virtually to substitute for in-person clinic visit. Patient and provider were located at their individual homes. --TEMITOPE Garcia CNP on 4/21/2020 at 9:28 PM    An electronic signature was used to authenticate this note.

## 2020-04-20 NOTE — TELEPHONE ENCOUNTER
Forgot to mention at appointment today,Patient has a constant runny nose for months. It is clear, could it be a side effect to a medicine?  Please advise     Please Advise [phone] Estephania Record)

## 2020-04-27 PROBLEM — N17.9 AKI (ACUTE KIDNEY INJURY) (HCC): Status: ACTIVE | Noted: 2020-01-01

## 2020-04-27 NOTE — TELEPHONE ENCOUNTER
HEATHER. Jones Jarrell called delaney FREY, he wanted to let Corbinaidan Javon know that they took him to the hospital last night. They are thinking he might have had a heart attack. Triponin is elevated and they are keeping him for a few days.

## 2020-04-27 NOTE — CONSULTS
[facility]  Consult Note           Reason for Consult:  Acute kidney injury  Requesting Physician:  Dr. Bruno Apple    Chief Complaint:  Shortness of breath  History Obtained From:  patient, electronic medical record    History of Present Ilness:    80year old male with HTN, DM admitted with sob. We have been asked to assist in further mgmt of his MING. Has not been feeling well with reduced intake, weakness, & confusion. Had syncopal event ~1 week ago. Noticed sob on admission with cyanosis. No chest pain, abdominal pain, or fevers. Brought in by family for further evaluation. Past Medical History:        Diagnosis Date    Bruit     Elevated PSA     HBP (high blood pressure)     IGT (impaired glucose tolerance)     IH (inguinal hernia)     LBBB (left bundle branch block)     Murmur     NIDDY (non-insulin dependent diabetes mellitus in young) ([facility] [address]     Pacer at end of battery life     Post PTCA     S/P lens implant        Past Surgical History:        Procedure Laterality Date    COLONOSCOPY      EYE SURGERY      HERNIA REPAIR         Home Medications:    No current facility-administered medications on file prior to encounter.       Current Outpatient Medications on File Prior to Encounter   Medication Sig Dispense Refill    Handicap Placard MISC by Does not apply route Expires : 4-, unable to walk long distances, dx parkinsons, chronic lower back pain, cognitive impairment 1 each 0    lisinopril-hydroCHLOROthiazide (PRINZIDE;ZESTORETIC) 20-12.5 MG per tablet Take 2 tablets by mouth once daily (Patient taking differently: Take 1 tablet by mouth daily ) 180 tablet 0    donepezil (ARICEPT) 10 MG tablet TAKE 1 TABLET BY MOUTH NIGHTLY FOR MEMORY 30 tablet 2    tamsulosin (FLOMAX) 0.4 MG capsule Take 1 capsule by mouth once daily 90 capsule 0    pravastatin (PRAVACHOL) 40 MG tablet TAKE 1 TABLET BY MOUTH ONCE DAILY 90 tablet 3    blood glucose monitor strips Test one activity     Days per week: Not on file     Minutes per session: Not on file    Stress: Not on file   Relationships    Social connections     Talks on phone: Not on file     Gets together: Not on file     Attends Druze service: Not on file     Active member of club or organization: Not on file     Attends meetings of clubs or organizations: Not on file     Relationship status: Not on file    Intimate partner violence     Fear of current or ex partner: Not on file     Emotionally abused: Not on file     Physically abused: Not on file     Forced sexual activity: Not on file   Other Topics Concern    Not on file   Social History Narrative    Not on file       Family History:   Family History   Problem Relation Age of Onset    Cancer Mother     Diabetes Mother     Alzheimer's Disease Father        Review of Systems:   Pertinent positives stated above in HPI. Remainder of 10 point review of systems were reviewed and were negative.     Physical exam:   Constitutional:  VITALS:  BP (!) 103/54   Pulse 81   Temp 96.2 °F (35.7 °C) (Axillary)   Resp 18   Ht 6' (1.829 m)   Wt 139 lb (63 kg)   SpO2 97%   BMI 18.85 kg/m²   Gen: alert, awake, nad  Skin: no rash, turgor wnl  Heent:  eomi, mmm  Neck: no bruits or jvd noted, thyroid normal  Cardiovascular:  S1, S2 without m/r/g  Respiratory: CTA B without w/r/r; respiratory effort normal  Abdomen:  +bs, soft, nt, nd, no hepatosplenomegaly  Ext: no lower extremity edema  Psychiatric: mood and affect appropriate; judgement and insight intact  Musculoskeletal:  Rom, muscular strength limited; digits, nails normal    Data/  CBC:   Lab Results   Component Value Date    WBC 5.4 04/26/2020    RBC 4.52 04/26/2020    HGB 13.9 04/26/2020    HCT 43.1 04/26/2020    MCV 95.4 04/26/2020    MCH 30.8 04/26/2020    MCHC 32.3 04/26/2020    RDW 14.4 04/26/2020     04/26/2020    MPV 9.1 04/26/2020     BMP:    Lab Results   Component Value Date     04/26/2020    K 4.9

## 2020-04-27 NOTE — ED PROVIDER NOTES
diabetes mellitus in young) ([facility]    [place] Code at end of battery life     Post PTCA     S/P lens implant          SURGICAL HISTORY       Past Surgical History:   Procedure Laterality Date    COLONOSCOPY      EYE SURGERY      HERNIA REPAIR           CURRENT MEDICATIONS       Previous Medications    BLOOD GLUCOSE MONITOR KIT AND SUPPLIES    Test one time a day & as needed for symptoms of irregular blood glucose. Please give whichever meter kit is allowed by insurance. BLOOD GLUCOSE MONITOR STRIPS    Test 1 times a day & as needed for symptoms of irregular blood glucose. PLEASE DISPENSE :  ACCU CHECK GUIDE TEST STRIPS. Dx: E11.19    BLOOD GLUCOSE MONITOR STRIPS    Test one times a day & as needed for symptoms of irregular blood glucose. Please give whichever test strips are approved by insurance    BLOOD GLUCOSE TEST STRIPS (ASCENSIA AUTODISC VI;ONE TOUCH ULTRA TEST VI) STRIP    1 each by In Vitro route daily As needed. CARBIDOPA-LEVODOPA (SINEMET)  MG PER TABLET    Take 1.5 tablets by mouth 3 times daily     DONEPEZIL (ARICEPT) 10 MG TABLET    TAKE 1 TABLET BY MOUTH NIGHTLY FOR MEMORY    HANDICAP PLACARD MISC    by Does not apply route Expires : 4-, unable to walk long distances, dx parkinsons, chronic lower back pain, cognitive impairment    LISINOPRIL-HYDROCHLOROTHIAZIDE (PRINZIDE;ZESTORETIC) 20-12.5 MG PER TABLET    Take 2 tablets by mouth once daily    METFORMIN (GLUCOPHAGE) 500 MG TABLET    Take 1 tablet by mouth 2 times daily (with meals) Increase in 4 weeks to 2 pills twice daily (with meals)    ONE TOUCH LANCETS MISC    1 each by Does not apply route daily    PRAVASTATIN (PRAVACHOL) 40 MG TABLET    TAKE 1 TABLET BY MOUTH ONCE DAILY    TAMSULOSIN (FLOMAX) 0.4 MG CAPSULE    Take 1 capsule by mouth once daily    THERAPEUTIC MULTIVITAMIN-MINERALS (THERAGRAN-M) TABLET    Take 1 tablet by mouth daily.        ALLERGIES     Pcn [penicillins]    FAMILY HISTORY       Family History   Problem

## 2020-04-27 NOTE — ED NOTES
Gave pt urinal pt attempted to urinate unable to void at this time      Gini Boggs, RN  04/26/20 [id number]

## 2020-04-27 NOTE — CONSULTS
[address] [facility]  [phone]        Reason for Consultation/Chief Complaint: No complaints at this time; Consulted for elevated troponin     History of Present Illness:  Mariana Salcedo is a 80 y.o. male who presented to [facility] 4/26/20 with reported SOB and fingers turning blue. He follows Phuong Lundberg and Juan Moran for OP cardiology--LOV 3/6/20 Dr. Maryann Colin. 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 . Most recent ECHO 4/6/11 EF 50-55%. Wall motion  normal; no regional wall motion abnormalities. grade 1 DD. Note patient is poor historian for specific history due to dementia. Family brought to ER for c/o SOB and cyanosis of finger. Note possible syncopal episode 1 week ago and EMS called but vitals stable and not brought to ER. Reported poor po intake x 1 week. Admit EKG 4/26/20 showed NSR w/ PAC; 1st degree AV block;  LBBB (no change from 11/10). Note Vikas 1.24, 1.02, 0.96. Pro-BNP 1,648. , Cr 1.8; note CXR no acute process; note CT head no intracranial abnormality. He was placed on heparin gtt and admitted to PCU. Patient is oriented to person, place, and time but does not know why he is here. He has no complaints of chest pain, SOB, palpitations, dizziness, edema, or orthopnea/PND. I have been asked to provide consultation regarding further management and testing. Past Medical History:   has a past medical history of Bruit, Elevated PSA, HBP (high blood pressure), IGT (impaired glucose tolerance), IH (inguinal hernia), LBBB (left bundle branch block), Murmur, NIDDY (non-insulin dependent diabetes mellitus in young) ([facility] [address] Pacer at end of battery life, Post PTCA, and S/P lens implant. Surgical History:   has a past surgical history that includes Colonoscopy; hernia repair; and eye surgery.      Social History:   reports that he has been smoking cigarettes, pipe, and cigars. He has been smoking about 0.50 packs per day. He has never used smokeless tobacco. He reports current alcohol use. He reports that he does not use drugs. Family History:  family history includes Alzheimer's Disease in his father; Cancer in his mother; Diabetes in his mother. Home Medications:  Were reviewed and are listed in nursing record. and/or listed below  Prior to Admission medications    Medication Sig Start Date End Date Taking? Authorizing Provider   Handicap Placard MISC by Does not apply route Expires : 4-, unable to walk long distances, dx parkinsons, chronic lower back pain, cognitive impairment 4/20/20   TEMITOPE Palomares CNP   lisinopril-hydroCHLOROthiazide (PRINZIDE;ZESTORETIC) 20-12.5 MG per tablet Take 2 tablets by mouth once daily  Patient taking differently: Take 1 tablet by mouth daily  3/16/20   TEMITOPE Palomares CNP   donepezil (ARICEPT) 10 MG tablet TAKE 1 TABLET BY MOUTH NIGHTLY FOR MEMORY 3/16/20   TEMITOPE Palomares CNP   tamsulosin (FLOMAX) 0.4 MG capsule Take 1 capsule by mouth once daily 3/16/20   TEMITOPE Green CNP   pravastatin (PRAVACHOL) 40 MG tablet TAKE 1 TABLET BY MOUTH ONCE DAILY 3/16/20   TEMITOPE Green CNP   blood glucose monitor strips Test one times a day & as needed for symptoms of irregular blood glucose. Please give whichever test strips are approved by insurance 2/24/20   TEMITOPE Jeffries CNP   blood glucose monitor strips Test 1 times a day & as needed for symptoms of irregular blood glucose. PLEASE DISPENSE :  ACCU CHECK GUIDE TEST STRIPS. Dx: E11.19 2/21/20   TEMITOPE Jeffries CNP   blood glucose test strips (ASCENSIA AUTODISC VI;ONE TOUCH ULTRA TEST VI) strip 1 each by In Vitro route daily As needed.  2/19/20   TEMITOPE Palomares CNP   metFORMIN (GLUCOPHAGE) 500 MG tablet Take 1 tablet by mouth 2 times daily (with meals) Increase in 4 weeks to 2 pills twice daily (with meals) 2/5/20 TEMITOPE Khan CNP   blood glucose monitor kit and supplies Test one time a day & as needed for symptoms of irregular blood glucose. Please give whichever meter kit is allowed by insurance. 3/13/19   TEMITOPE Miller CNP   ONE TOUCH LANCETS MISC 1 each by Does not apply route daily 3/13/19   TEMITOPE Miller CNP   carbidopa-levodopa (SINEMET)  MG per tablet Take 1.5 tablets by mouth 3 times daily  5/31/17   Historical Provider, MD   therapeutic multivitamin-minerals [facility]) tablet Take 1 tablet by mouth daily. Historical Provider, MD        Allergies:  Pcn [penicillins]     Review of Systems:   All 14 point review of symptoms completed. Pertinent positives identified in the HPI, all other review of symptoms negative as below.       Physical Examination:    Vitals:    04/27/20 0245   BP: (!) 91/45   Pulse: 66   Resp: 18   Temp: 97.5 °F (36.4 °C)   SpO2: 96%    Weight: 139 lb (63 kg)         General Appearance:  Alert, cooperative, no distress, appears stated age   Head:  Normocephalic, without obvious abnormality, atraumatic   Eyes:  PERRL, conjunctiva/corneas clear       Nose: Nares normal, no drainage or sinus tenderness   Throat: Lips, mucosa, and tongue normal   Neck: Supple, symmetrical, trachea midline, no adenopathy, thyroid: not enlarged, symmetric, no tenderness/mass/nodules, no carotid bruit or JVD       Lungs:   Clear to auscultation bilaterally, respirations unlabored   Chest Wall:  No tenderness or deformity   Heart:  Regular rate and rhythm, S1, S2 normal, no murmur, rub or gallop   Abdomen:   Soft, non-tender, bowel sounds active all four quadrants,  no masses, no organomegaly           Extremities: Extremities normal, atraumatic, no cyanosis or edema   Pulses: 2+ and symmetric   Skin: Skin color, texture, turgor normal, no rashes or lesions   Pysch: Oriented x 3 but unsure of why he is here and hard to get specifics on history taking   Neurologic: Normal gross motor and sensory exam.         Labs  CBC:   Lab Results   Component Value Date    WBC 5.4 04/26/2020    RBC 4.52 04/26/2020    HGB 13.9 04/26/2020    HCT 43.1 04/26/2020    MCV 95.4 04/26/2020    RDW 14.4 04/26/2020     04/26/2020     CMP:    Lab Results   Component Value Date     04/26/2020    K 4.9 04/26/2020     04/26/2020    CO2 27 04/26/2020     04/26/2020    CREATININE 1.8 04/26/2020    GFRAA 44 04/26/2020    GFRAA >60 11/18/2010    AGRATIO 1.5 04/26/2020    LABGLOM 36 04/26/2020    LABGLOM 86 09/11/2013    GLUCOSE 296 04/26/2020    PROT 7.4 04/26/2020    CALCIUM 10.0 04/26/2020    BILITOT 0.3 04/26/2020    ALKPHOS 78 04/26/2020    AST 38 04/26/2020    ALT 6 04/26/2020     PT/INR:  No results found for: PTINR  Lab Results   Component Value Date    CKTOTAL 331 (H) 04/27/2020    TROPONINI 0.96 ([place]) 04/27/2020   [facility] 10/7/2010   PCI stent to 1st OMB     [facility] 9/2010   PCI to RCA with DANNY     ECHO 4/6/11  Left ventricle: The cavity size was mildly dilated. Wall thickness    was normal. Systolic function was normal. The estimated ejection    fraction was in the range of 50% to 55%. Wall motion was normal;    there were no regional wall motion abnormalities. Doppler    parameters are consistent with abnormal left ventricular    relaxation (grade 1 diastolic dysfunction). EKG 4/26/20  Sinus rhythm with Premature atrial complexesFirst degree AV blockLeft bundle branch blockAbnormal ECGNo previous ECGs availableConfirmed by FAITH Woody MD ([id number]) on 4/27/2020 7:33:34 AM    Assessment:  Renae Tracy is a 80 y.o. male who presented to [facility] 4/26/20 with reported SOB and fingers turning blue. He follows Phuong Lundberg and Juan M Vegas for OP cardiology--LOV 3/6/20 Dr. Lissette Son. 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 . Most recent ECHO 4/6/11 EF 50-55%.  Marco Stallings motion  normal; no regional wall motion abnormalities. grade 1 DD. Note patient is poor historian for specific history due to dementia. Family brought to ER for c/o SOB and cyanosis of finger. Note possible syncopal episode 1 week ago and EMS called but vitals stable and not brought to ER. Reported poor po intake x 1 week. Admit EKG 4/26/20 showed NSR w/ PAC; 1st degree AV block;  LBBB (no change from 11/10). Note Vikas 1.24, 1.02, 0.96. Pro-BNP 1,648. , Cr 1.8; note CXR no acute process; note CT head no intracranial abnormality. He was placed on heparin gtt and admitted to PCU. Patient is oriented to person, place, and time but does not know why he is here. Diagnosis of elevated Vikas concerning for NSTEMI in elderly male with acute renal failure and hx of CAD s/p stents to RCA and OM1 remotely and . Baseline LBBB EKG old and he denies any CP but history may be unreliable. Recs:  1. Continue unfractionated heparin gtt x 48 hours. 2. Continue pravachol 40mg qhs and baby aspirin qd.  3. No beta blocker due to lower BP. 4. I recommend ECHO to evaluate LV function and size, wall motion, and valves for any structural abnormalities. Will need ACE-I and BB if EF depressed and able to tolerate. 5. IVF for rehydration and follow renal function. 6. Would treat conservatively now given elderly age, ARF, and dementia/Parkinson's. I would not pursue ischemia w/u with stress as would not recommend cath if abnormal. Note I spoke to POA daughter who agrees with plan and thinks her dad too frail for any invasive testing.        Patient Active Problem List   Diagnosis    Elevated PSA    LBBB (left bundle branch block)    Post PTCA    Bruit    Pacemaker    Mixed hyperlipidemia    Essential hypertension    Type 2 diabetes mellitus without complication, without long-term current use of insulin ([address]    New onset seizure ([address]    Leukopenia    Cognitive impairment    MING (acute kidney injury) ([address] Thank you for allowing to us to participate in the care or Dinorah Matter. Further evaluation will be based upon the patient's clinical course and testing results.

## 2020-04-27 NOTE — PLAN OF CARE
Pt was seen by nocturnist on 4/27/2020. Please see complete H&P from 4/27/2020 for further information. Chart Reviewed:    VSS  BP (!) 103/54   Pulse 81   Temp 96.2 °F (35.7 °C) (Axillary)   Resp 18   Ht 6' (1.829 m)   Wt 139 lb (63 kg)   SpO2 97%   BMI 18.85 kg/m²      Brief Exam:  General: elderly  male, mildly Tribal; laying on his side, legs drawn up  Cardio: RRR, no murmurs, rubs or gallops, no edema  Resp: CTA bilaterally  Skin: warm, dry, no notable rashes  Psych: oriented to self only, able to recall month but not year or president    Labs reviewed: Na 153, Cl 117, Cr 1.8 -->1.2, BUN 99, , Trop 1.24 --> 0.96 --> 0.92; CBC unremarkable, aPTT: 134.5  Micro: None  Radiology: CT head - non acute; CXR non acute      Diagnoses: MING w/ marked azotemia  Dehydration  - Cr 1.8,   - Etiology: likely prerenal  - Admit to PCU, tele  - monitor renal function: 1.8 --> 1.2  - IVF --> changed to 0.45 NS at rate 75 cc/hr, hold HCTZ & ACE-I  - nephro c/s    NSTEMI  CAD s/p PCI to RCA & OM1   - follows with Dr. Imelda Nicolas and Haja Greenwood  - initial trop: 1.24, repeat 1.02  - tele, serial troponins  - start ASA, full dose heparin gtt, Pravastatin, no BB 2/2 low BP  - cardiology consulted  - check echo - pending    Elevated aPTT  - 155.9 --> 134.5  - stop infusion x 1 hours and then decreasef dose & repeat aPTT  - pharmacy to manage    Hypernatremia  - 145 --> 153  - stop Normal saline, change to 0.45 NS   - repeat BMP at 1400    Borderline Low BPs  Hx of HTN  - on IVF, hold HCTZ and ACEi  - will monitor for now    S/p Dual Chamber Pacemaker  - 2/2 hx of symptomatic bradycardia    DM Type II  - uncontrolled  - hold Metformin, low dose SSI  - POC Glucose, monitor    Parkinson's dementia   - family reports this is patient's baseline  - cont home Sinemet and donepezil    Syncope  - ~ 1 week ago  - no episodes since  - tele, fall precautions.       Generalized weakness  - PT/OT eval.    Prolonged QTc  - 486 ms  -  avoid qt prolonging agents as able.       Code Status  - Pt's daughter Richie Muhammad wants clarification and further explanation regarding various code status options as pt's wife passed & there was uncertainty regarding her code status at her time of passing  - will place palliative care consult for code status discussion  - pt will remain full code at this time    Yumi Armenta PA-C 12:13 PM 4/27/2020

## 2020-04-27 NOTE — ED NOTES
trp = 1.09, ED MD notified.       Chio Membreno, HUBER  04/27/20 [address] [facility], RN  04/27/20 [id number]

## 2020-04-27 NOTE — PROGRESS NOTES
Patient admitted to room 325-1 from ED. Patient oriented to room, call light, bed rails, phone, lights and bathroom. Patient instructed about the schedule of the day including: vital sign frequency, lab draws, possible tests, frequency of MD and staff rounds, daily weights, I &O's and prescribed diet. YES bed alarm in place, patient aware of placement and reason. YES Telemetry box in place, patient aware of placement and reason. Bed locked, in lowest position, side rails up 2/4, call light within reach. Telesitter placed for diagnosis of dementia      Recliner Assessment  Patient is not able to demonstrated the ability to move from a reclining position to an upright position within the recliner. Patient is confused, demented and /or unable to follow instruction. 4 Eyes Skin Assessment     The patient is being assessed for Admission    I agree that 2 RN's have performed a thorough Head to Toe Skin Assessment on the patient. ALL assessment sites listed below have been assessed. Areas assessed by both nurses: Harland Bevel  [x]   Head, Face, and Ears   [x]   Shoulders, Back, and Chest, Abdomen  [x]   Arms, Elbows, and Hands   [x]   Coccyx, Sacrum, and Ischium  [x]   Legs, Feet, and Heels        Blanchable redness to coccyx; preventative mepilex placed. Dry flaky feet bilaterally. Co-signer eSignature: Electronically signed by Jasmina Mendez RN on 4/27/20 at 3:37 AM EDT    Does the Patient have Skin Breakdown?   No          Santhosh Prevention initiated:  Yes   Wound Care Orders initiated:  No      [facility] nurse consulted for Pressure Injury (Stage 3,4, Unstageable, DTI, NWPT, Complex wounds)and New or Established Ostomies:  No      Primary Nurse eSignature: Electronically signed by Carmen Montes De Oca RN on 4/27/20 at 3:25 AM EDT

## 2020-04-27 NOTE — ED NOTES
[id number]- perfect serve to Dr. Melva Randhawa for admission consult per Dr. Arabella Benedict. Anjelica Diss  04/27/20 [phone]- Dr. Melva Randhawa returned page spoke with Dr. Arabella Benedict.      Anjelica Diss  04/27/20 0033

## 2020-04-27 NOTE — PROGRESS NOTES
The patient is an extremely poor historian, oriented to self only. Admission questions unable to be completed at this time. Will need to call family for additional information.

## 2020-04-27 NOTE — PROGRESS NOTES
Report given to Guilherme Luevano RN at bedside for transfer of care. Pt denies needs at this time, call light within reach.

## 2020-04-27 NOTE — ED PROVIDER NOTES
11:36 PM: I discussed the history, physical examination, laboratory and imaging studies, and treatment plan with Dr. Fatoumata Brower. Alen Nunez was signed out to me in stable condition. Please see Dr. Keely Zepeda documentation for details of their history, physical, and laboratory studies. Upon re-examination, a summary of Neftali Orellana's history, physical examination, and studies are as follows: I did reevaluate the patient, he is confused, does not know his name, understands he is in a hospital, does not know how he arrived here and does not know why he is here. He is found to have an acute kidney injury with significant dehydration with a markedly elevated BUN to creatinine ratio, this is likely the cause of the elevated troponin, we are placing a Acosta catheter, providing fluid bolus over the next couple of hours, checking for infection in the urine. He has a left bundle branch block which is old. He has no ongoing or active chest pain. Chest x-ray was clear and CT head showed no acute intracranial abnormality. 12:25 AM  Urinalysis is clear will admit for fluids and renal management. 1:40 AM  Repeat troponin after fluids has improved, have spoken with hospitalist who will admit. 1. Generalized weakness    2. Confusion    3. Syncope and collapse    4.  MING (acute kidney injury) ([address]        Results for orders placed or performed during the hospital encounter of 04/26/20   CBC Auto Differential   Result Value Ref Range    WBC 5.4 4.0 - 11.0 K/uL    RBC 4.52 4.20 - 5.90 M/uL    Hemoglobin 13.9 13.5 - 17.5 g/dL    Hematocrit 43.1 40.5 - 52.5 %    MCV 95.4 80.0 - 100.0 fL    MCH 30.8 26.0 - 34.0 pg    MCHC 32.3 31.0 - 36.0 g/dL    RDW 14.4 12.4 - 15.4 %    Platelets 235 193 - 869 K/uL    MPV 9.1 5.0 - 10.5 fL    PLATELET SLIDE REVIEW Adequate     SLIDE REVIEW see below     Neutrophils % 73.0 %    Lymphocytes % 21.0 %    Monocytes % 5.0 %    Eosinophils % 0.0 %    Basophils % 0.0 %    Neutrophils Absolute No acute process.         Lisseth Norman MD  04/27/20 0025       Lisseth Norman MD  04/27/20 [id number]

## 2020-04-27 NOTE — FLOWSHEET NOTE
04/27/20 0808   Vital Signs   Temp 96.2 °F (35.7 °C)   Temp Source Axillary   Pulse 81   Heart Rate Source Monitor   Resp 18   BP (!) 103/54   BP Location Right Arm   BP Upper/Lower Upper   MAP (mmHg) 70   Patient Position Supine;Lying right side   Level of Consciousness 0   MEWS Score 1   Patient Currently in Pain Denies   Oxygen Therapy   SpO2 97 %   O2 Device None (Room air)   Patient resting quietly in bed. No s/s of distress noted. Shift assessment complete, see flow sheet. Denies needs at this time. Call light within reach. Will continue to monitor.

## 2020-04-27 NOTE — CARE COORDINATION
CM attempted to call into the room to do CM initial assessment/interview. Call placed to [facility]- Misty/Kemal Self. No Answer/no VM.  CM will follow up to do assessment in the am.

## 2020-04-27 NOTE — TELEPHONE ENCOUNTER
Received call from patient son in law Toña Vides) states patient still not eating/drinking enough and is breathing shallow and fingertips purple wants to know if they should take to the hospital ER or if can wait until tomorrow. Tried to call back 5 times. Voice mail box full. [phone]. Finally got in touch with daughter Radha Dubois [phone]. both on HIPAA. Advised to go to ED. Uncomfortable with that due to COVID. Understanding but in light of situation if stable could proceed to flu clinic in am at [address] [facility] otherwise ED tonight. Discussed with them. Breathing, blueing of finger tips, /54. No weight gain. Stable at 139. Some SOB on exertion. No chest pain. Will follow up in AM. They know to call back tonight if any issues or concerns.  Rylee Dodson

## 2020-04-27 NOTE — H&P
Hospital Medicine History & Physical      PCP: TEMITOPE Fuentes - CNP    Date of Service: Pt seen/examined on 4/27/20 and admitted on 4/27/20 to Inpatient    Chief Complaint   Patient presents with    Shortness of Breath     family brought pt to triage with report of SOB worsening and they noticed his fingers turning blue , pt is caox2 denies SOB , Denies CP at this time, pt poor historian       History Of Present Illness: The patient is a 80 y.o. male with PMH below, presents with cyanosis, recent syncopal event, decreased PO intake, confusion, generalized weakness. Pt reportedly had a syncopal event ~ 1 week ago. EMS responded but family/pt opted to not have him taken in for eval.  For the last week, the pt has reportedly not been eating very well; either fluids or solids. Family also reported to ER that pt has been increasingly weak and confused. Family also reportedly noticed that the pt's extremities were cyanotic today so they brought him to ER. No family at bedside when I saw pt. Pt is a very poor historian. He denies CP at this time. 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  Authorizing Provider   Handicap Placard MISC by Does not apply route Expires : 4-, unable to walk long distances, dx parkinsons, chronic lower back pain, cognitive impairment 4/20/20   TEMITOPE Fuentes CNP   lisinopril-hydro[facility]iazide [facility]) used smokeless tobacco.  ETOH:   reports current alcohol use. Family History:  Reviewed in detail and negative for DM, Early CAD, Cancer (except as below). Positive as follows:        Problem Relation Age of Onset    Cancer Mother     Diabetes Mother     Alzheimer's Disease Father        REVIEW OF SYSTEMS:   Unable to obtain 2/2 pt confusion. PHYSICAL EXAM PERFORMED:    BP (!) 95/57   Pulse 64   Temp 97.6 °F (36.4 °C) (Oral)   Resp 15   Ht 6' (1.829 m)   Wt 139 lb (63 kg)   SpO2 94%   BMI 18.85 kg/m²     GEN:  A&Ox1, NAD. HEENT:  NC/AT,EOMI, dry MM, no erythema/exudates or visible masses. CVS:  Normal S1,S2. RRR. Without M/G/R.   LUNG:   CTA-B. no wheezes, rales or rhonchi  ABD:  Soft, ND/NT, BS+ x4. Without G/R.  EXT: 2+ pulses, no c/c/e. Brisk cap refill. PSY:  Thought process confused, affect appropriate. SOFIE:  Pt does not follow commands very well. Grossly: CN III-XII intact, moves all 4 spontaneously, sensory grossly intact. SKIN: No rash or lesions on visible skin. Chart review shows recent radiographs:  Ct Head Wo Contrast    Result Date: 4/26/2020  EXAMINATION: CT OF THE HEAD WITHOUT CONTRAST  4/26/2020 10:54 pm TECHNIQUE: CT of the head was performed without the administration of intravenous contrast. Dose modulation, iterative reconstruction, and/or weight based adjustment of the mA/kV was utilized to reduce the radiation dose to as low as reasonably achievable. COMPARISON: 03/10/2016 HISTORY: ORDERING SYSTEM PROVIDED HISTORY: fall, ams, syncope TECHNOLOGIST PROVIDED HISTORY: Reason for exam:->fall, ams, syncope Has a \"code stroke\" or \"stroke alert\" been called? ->No Reason for Exam: possible head inj Acuity: Unknown Type of Exam: Unknown Mechanism of Injury: hx of fall, pt confused; unable to give hx, unknown LOC FINDINGS: BRAIN/VENTRICLES: There is no acute intracranial hemorrhage, mass effect or midline shift. No abnormal extra-axial fluid collection.   No evidence of recent territorial infarct. There are nonspecific areas of hypoattenuation in the periventricular white matter and centrum semiovale that are likely related to chronic small vessel ischemic disease. The ventricles and cisternal spaces are prominent consistent with cerebral atrophy. There is no evidence of hydrocephalus. There is intracranial atherosclerosis. ORBITS: The visualized portion of the orbits demonstrate no acute abnormality. SINUSES: The visualized paranasal sinuses and mastoid air cells demonstrate no acute abnormality. SOFT TISSUES/SKULL:  No acute abnormality of the visualized skull or soft tissues. No acute intracranial abnormality. Xr Chest Portable    Result Date: 4/26/2020  EXAMINATION: ONE XRAY VIEW OF THE CHEST 4/26/2020 10:31 pm COMPARISON: None HISTORY: ORDERING SYSTEM PROVIDED HISTORY: sob TECHNOLOGIST PROVIDED HISTORY: Reason for exam:->sob Reason for Exam: SOB, AMS Acuity: Unknown Type of Exam: Unknown Additional signs and symptoms: pt  unable to give hx, denies chest pain FINDINGS: A dual lead left chest pacemaker is in place. The cardiac silhouette is normal in appearance. The descending thoracic aorta is tortuous. No pneumothorax, vascular congestion, consolidation, or pleural effusion is identified. No acute osseous abnormality. No acute process. EKG 12 Lead [[id number]]    Collected: 04/26/20 2214    Updated: 04/26/20 2236     Ventricular Rate 67 BPM    Atrial Rate 67 BPM    P-R Interval 156 ms    QRS Duration 162 ms    Q-T Interval 460 ms    QTc Calculation (Bazett) 486 ms    P Axis 73 degrees    R Axis 55 degrees    T Axis 187 degrees    Diagnosis Sinus rhythm with Premature atrial complexesLeft bundle branch blockAbnormal ECGNo previous ECGs available     Appears similar to previous from 11/18/10.       CBC:  Recent Labs     04/26/20  2200   WBC 5.4   HGB 13.9   HCT 43.1           RENAL  Recent Labs     04/26/20  2200      K 4.9      CO2 27   BUN 127*   CREATININE 1.8*   GLUCOSE 296*       Hemoglobin a1c:  Lab Results   Component Value Date    LABA1C 9.7 02/05/2020    LABA1C 7.9 11/04/2019    LABA1C 7.7 06/28/2019       LFT'S:  Recent Labs     04/26/20  2200   AST 38*   ALT 6*   BILITOT 0.3   ALKPHOS 78       COAG:  Recent Labs     04/26/20  2200   INR 0.97       CARDIAC ENZYMES:   Recent Labs     04/26/20  2200 04/27/20  0110   TROPONINI 1.24* 1.02*     Lab Results   Component Value Date    PROBNP 1,648 (H) 04/26/2020       LACTIC ACID:  Recent Labs     04/26/20  2348   LACTA 2.1*     U/A:  Recent Labs     04/26/20  2340   LEUKOCYTESUR Negative   COLORU Yellow   CLARITYU Clear   SPECGRAV 1.025   BLOODU Negative   GLUCOSEU Negative       PHYSICIAN CERTIFICATION  I certify that Marjean Lesches is expected to be hospitalized for 2 midnights based on the following assessment and plan:    ASSESSMENT/PLAN:  1. MING w/ marked azotemia, Cr 1.8, . Suspect prerenal causes, IVF, nephro c/s. Hold home HCTZ and ACE-I.    2. NSTEMI, likely type 2 but type 1 is a consideration given his degree of elevated troponin and inability to give useful hx. Initial trop 1.24, repeat 1.02. Start ASA, full dose heparin gtt. IVF, tele, serial trops, cards c/s.  3. Parkinson's dementia w/ ? Acute encephalopathy, cont home Sinemet and donepezil. Unable to confirm current dosing or pt's other home Rx so will order as previously recorded in Epic. Will likely need confirmation from pt's pharmacy in am ([address]?, [phone]). 4. Syncope, ~ 1 week ago, no episodes since. Fall precautions. 5. Generalized weakness, PT/OT eval.  6. Prolonged QTc, 486 ms, avoid qt prolonging agents as able. 7. Dehydration, IVF. DVT Prophylaxis: heparin gtt  Diet: renal, carb control.    Code Status: Full Code  PT/OT Eval Status: ordered  Dispo - Admit to inpatient PCU    Karyle Milroy, MD    Thank you TEMITOPE Sanchez - CATARINA for the opportunity to be involved in this patient's care. If you have any questions or concerns please feel free to contact me via the [employer] at [phone]. This chart was generated using the [address] [place] [address] dictation system. I created this record but it may contain dictation errors given the limitations of this technology.

## 2020-04-27 NOTE — PROGRESS NOTES
Inpatient Occupational Therapy  Evaluation and Treatment    Unit: PCU  Date:  4/27/2020  Patient Name:    Jazmin Timmons  Admitting diagnosis:  MING (acute kidney injury) [facility]) [N17.9]  Admit Date:  4/26/2020  Precautions/Restrictions/WB Status/ Lines/ Wounds/ Oxygen:   fall risk, IV, bed/chair alarm, liu catheter , telemetry and telesitter  Treatment Time:  0350-5262  Treatment Number: 1   Billable Treatment Time: 23 minutes   Total Treatment Time:   33   minutes    Patient Goals for Therapy:  \" not stated \"      Discharge Recommendations: Home with 24/7 assist and home therapy  DME needs for discharge: RW       Therapy recommendations for staff:   Assist of 1 (minimal assist) with use of rolling walker (RW) for all transfers to/from BSC/chair    History of Present Illness:   80 y.o. male with past medical history of pretension, hyperlipidemia, diabetes, left bundle branch block and dementia here today for concern of family     Family reports that 1 week ago the patient had a syncopal event. EMS was called to the [facility] S4 Level Recommendation:  Level 1 Standard  AM-PAC Score:  15    Preadmission Environment    Information from pt's RN who spoke with family earlier this. Per RN pt unreliable historian due to confusion/dementia. Pt. Lives with family (dtr and DAYRON) and 24/7 Assist Available   Home environment:    Unknown  Steps to enter first floor: Unknown  Bathroom: Unknown  Equipment owned: [facility]   Pt in [address] [facility] chair for comfort due to history of vertebral fxs - unable to tolerate laying flat     Preadmission Status:  Pt. Able to drive: No  Pt Fully independent with ADLs: Yes  Pt. Required assistance from family for: Cleaning, Cooking and Laundry   Pt.  Fully independent for transfers and gait and walked with Debbora Olp  History of falls Unknown  Pain  No  Rating:NA  Location:NA  Pain Medicine Status: No request made      Cognition    A&O Person and Place (able to state [facility], not which), not date (pt able to oriented to spring by looking out window)  Re-oriented to date  Able to follow 1 step commands    Subjective  Patient lying supine in bed with no family present  Pt agreeable to this OT eval & tx. Upper Extremity ROM:    WFL    Upper Extremity Strength:    WFL  Upper Extremity Sensation    NT    Upper Extremity Proprioception:   NT    Coordination and Tone  Diminished    Balance  Functional Sitting Balance:  WFL  Functional Standing Balance:Impaired    Bed mobility:    Supine to sit:   Modified Independent  Sit to supine:   SBA  Scooting to head of bed:   Supervision  Scooting in sitting:  Supervision  Rolling:   Not Tested  Bridging:   Not Tested    Transfers:    Sit to stand:  CGA  Stand to sit:  CGA  Bed to BSC:  NT  Bed to chair:   CGA with hand held assist  Standard toilet: Not Tested    Activity Tolerance   Pt completed therapy session with SOB noted with activity  SpO2: 95% on room air   BP: 110/60    Dressing:      UE:   Not Tested  LE:    Supervision to don socks in bed     Bathing:    UE:  Not Tested  LE:  Not Tested    Eating:   Supervision drinking from cup and eating peaches    Toileting:  Not Tested    Positioning Needs: In bed, call light and needs in reach. Alarm Set    Exercise / Activities Initiated:   N/A    Patient/Family Education:   Role of OT    Assessment of Deficits: Pt seen for Occupational therapy evaluation in acute care setting. Pt demonstrated decreased Activity Tolerance, ADL's, Balance, Bathing, Bed Mobility, Dressing, Safety Awareness and Transfers. Pt functioning below baseline and will likely benefit from skilled occupational therapy services to maximize safety and independence. Goal(s) : To be met in 3 Visits:  1). Bed to toilet/BSC: SBA and with use of RW    To be met in 5 Visits:  1). Supine to Sit: Modified Independent  2). Upper Body Bathing:  Supervision  3). Lower Body Bathing:  CGA  4). Upper Body Dressing: Supervision  5).  Lower Body Dressing:

## 2020-04-27 NOTE — PROGRESS NOTES
Inpatient Physical Therapy Evaluation and Treatment    Unit: PCU  Date:  4/27/2020  Patient Name:    Blu Fry  Admitting diagnosis:  MING (acute kidney injury) [facility]) [N17.9]  Admit Date:  4/26/2020  Precautions/Restrictions/WB Status/ Lines/ Wounds/ Oxygen: fall risk, IV, bed/chair alarm, liu catheter , telemetry and telesitter    Treatment Time:  3879-0997  Treatment Number:  1   Timed Code Treatment Minutes: 21 minutes  Total Treatment Minutes:  31  minutes    Patient Goals for Therapy: Not stated          Discharge Recommendations: Home with 24/7 assist and home therapy  DME needs for discharge: RW       Therapy recommendation for EMS Transport: can transport by wheelchair    Therapy recommendations for staff:   Assist of 1 with use of rolling walker (RW) for all transfers to/from BSC/chair    History of Present Illness: Per EMR: \"80 y.o. male with PMH below, presents with cyanosis, recent syncopal event, decreased PO intake, confusion, generalized weakness. Pt reportedly had a syncopal event ~ 1 week ago. \"    Home Health S4 Level Recommendation:  Level 1 Standard  AM-PAC Mobility Score    AM-PAC Inpatient Mobility Raw Score : 17       Preadmission Environment  Information from pt's RN who spoke with family earlier this. Per RN pt unreliable historian due to confusion/dementia. Pt. Lives with family (dtr and DAYRON) and 24/7 Assist Available   Home environment:  Unknown  Steps to enter first floor: Unknown  Bathroom: Unknown  Equipment owned: [facility]   Pt in [address] chair for comfort due to history of vertebral fxs - unable to tolerate laying flat    Preadmission Status:  Pt. Able to drive: No  Pt Fully independent with ADLs: Yes  Pt. Required assistance from family for: Cleaning, Cooking and Laundry   Pt.  Fully independent for transfers and gait and walked with Shawn Olea  History of falls Unknown    Pain   No  Location:   Rating: NA /10  Pain Medicine Status: No request made    Cognition    A&O Person and Place (able to state hospital, not which), not date (pt able to oriented to spring by looking out window)  Re-oriented to date  Able to follow 1 step commands    Subjective  Patient lying supine in bed with no family present  Pt agreeable to this PT eval & tx. Upper Extremity ROM/Strength  Please see OT evaluation. Lower Extremity ROM / Strength    AROM WFL: Yes  ROM limitations:     Formal strength testing deferred due to confusion. and BLE strength impaired, but not formally assessed with MMT. Lower Extremity Sensation    NT    Lower Extremity Proprioception:   NT    Coordination and Tone  Impaired    Balance  Static Sitting:  Good - ; CGA   Tolerance:   Dynamic Sitting:  Fair +; CGA   Tolerance:   Static Standing: Fair ; CGA   Tolerance:   Dynamic Standing: Fair -; CGA   Tolerance:     Bed Mobility   Supine to Sit:   Modified Independent with HOB elevated  Sit to Supine:  SBA  Rolling:   Not Tested  Scooting at EOB: Supervision  Scooting to [facility]:  Not Tested    Transfer Training     Sit to stand:   CGA  Stand to sit:   CGA  Bed to Chair:  CGA with use of No AD   Chair to Bed:  CGA with RW    Gait gait completed as indicated below  Distance:  3 ft  Deviations (firm surface/linoleum): decreased huy, shuffles, decreased step height bilaterally and decreased step length bilaterally   Assistive Device Used:  No AD, unilateral HHA  Level of Assist: Min A  Comment:     Distance:  3 ft  Deviations (firm surface/linoleum): decreased huy, shuffles, decreased step height bilaterally and decreased step length bilaterally   Assistive Device Used:  RW  Level of Assist: CGA for balance, Mod A for management of RW  Comment:     Stair Training deferred, pt unsafe/not appropriate to complete stairs at this time  Pt ascended/descended  stairs with N/A with N/A, and use of N/A.   Pattern: N/A    Activity Tolerance   Pt completed therapy session with SOB noted with transfer, recovered ~ 1-2 min of seated rest Post-transfer  SpO2: 95% on RA  BP: 110/60    Positioning Needs   Pt instructed and educated on use of call light to call for assist if desiring to get up or change position, call light handed to pt and needs within reach, Pt returned to bed and alarm set    Patient/Family Education   Pt educated on role of inpatient PT, POC, importance of continued activity, safety awareness, transfer techniques and calling for assist with mobility. Assessment  Pt seen for Physical Therapy evaluation in acute care setting. Pt demonstrated decreased Activity tolerance, Balance, Safety and Strength and decreased independence with Ambulation and Transfers. Pt requiring CGA to min A for OOB mobility at this time. Pt limited by confusion with mobility requiring frequent VC for safety awareness. Pt would benefit from continued acute PT services to safely progress IND with functional mobility. Recommend home 24/7 assist and home PT at discharge. Goals : To be met in 3 visits:  1). Independent with LE Ex x 10 reps    To be met in 6 visits:  1). Supine to/from sit: Modified Independent  2). Sit to/from stand: SBA  3). Bed to chair: SBA  4). Gait: Ambulate 50 ft.  with  SBA  and use of LRAD (least restrictive assistive device)  5). Tolerate B LE exercises 3 sets of 10-15 reps  6). Ascend/descend 2 steps with Min A with use of one hand rail and No AD. Rehabilitation Potential: Good  Strengths for achieving goals include:   PLOF, Family Support and Pt cooperative  Barriers to achieving goals include: Other: confusion    Plan    To be seen 3-5 x / week  while in acute care setting for therapeutic exercises, bed mobility, transfers, progressive gait training, balance training, and family/patient education. Nely Tate, PT, DPT #[id number]    If patient discharges from this facility prior to next visit, this note will serve as the Discharge Summary.

## 2020-04-28 PROBLEM — E43 SEVERE PROTEIN-CALORIE MALNUTRITION (HCC): Status: ACTIVE | Noted: 2020-01-01

## 2020-04-28 NOTE — PLAN OF CARE
Problem: Falls - Risk of:  Goal: Will remain free from falls  Description: Will remain free from falls  4/28/2020 0446 by Radha Ryder RN  Outcome: Ongoing  Note: Pt will remain free of falls during shift. Fall Precautions in place. Bed locked in lowest position. Bed alarm engaged. Telesitter in use. Call light within reach. 4/27/2020 1839 by Marcia Everett RN  Outcome: Ongoing  Goal: Absence of physical injury  Description: Absence of physical injury  4/28/2020 0446 by Radha Ryder RN  Outcome: Ongoing  4/27/2020 1839 by Marcia Everett RN  Outcome: Ongoing     Problem: Infection:  Goal: Will remain free from infection  Description: Will remain free from infection  4/28/2020 0446 by Radha Ryder RN  Outcome: Ongoing  4/27/2020 1839 by Marcia Everett RN  Outcome: Ongoing     Problem: Safety:  Goal: Free from accidental physical injury  Description: Free from accidental physical injury  4/27/2020 1839 by Marcia Everett RN  Outcome: Ongoing  Goal: Free from intentional harm  Description: Free from intentional harm  4/27/2020 1839 by Marcia Everett RN  Outcome: Ongoing     Problem: Daily Care:  Goal: Daily care needs are met  Description: Daily care needs are met  4/28/2020 0446 by Radha Ryder RN  Outcome: Ongoing  4/27/2020 1839 by Marcia Everett RN  Outcome: Ongoing     Problem: Pain:  Goal: Patient's pain/discomfort is manageable  Description: Patient's pain/discomfort is manageable  4/28/2020 0446 by Radha Ryder RN  Outcome: Ongoing  Note: Pt will use pain scale 0-10 appropriately. Will assess for pain frequently. Will medicate with pain meds as ordered. No current complaints of pain    4/27/2020 1839 by Marcia Everett RN  Outcome: Ongoing     Problem: Skin Integrity:  Goal: Skin integrity will stabilize  Description: Skin integrity will stabilize  4/28/2020 0446 by Radha Ryder RN  Outcome: Ongoing  Note: Pt remain free of skin breakdown during hospital stay.  Pt encouraged to self turn frequently. Turn q2h and prn. Will assess skin per shift or as needed.      4/27/2020 1839 by Sohail Robles RN  Outcome: Ongoing     Problem: Discharge Planning:  Goal: Patients continuum of care needs are met  Description: Patients continuum of care needs are met  4/27/2020 1839 by Sohail Robles, RN  Outcome: Ongoing

## 2020-04-28 NOTE — PLAN OF CARE
Problem: Falls - Risk of:  Goal: Will remain free from falls  Description: Will remain free from falls  4/28/2020 1006 by Silva Lopez RN  Outcome: Ongoing  4/28/2020 0446 by Carmen Montes De Oca RN  Outcome: Ongoing  Note: Pt will remain free of falls during shift. Fall Precautions in place. Bed locked in lowest position. Bed alarm engaged. Telesitter in use. Call light within reach. Goal: Absence of physical injury  Description: Absence of physical injury  4/28/2020 1006 by Silva Lopez RN  Outcome: Ongoing  4/28/2020 0446 by Carmen Montes De Oca RN  Outcome: Ongoing     Problem: Infection:  Goal: Will remain free from infection  Description: Will remain free from infection  4/28/2020 1006 by Silva Lopez RN  Outcome: Ongoing  4/28/2020 0446 by Carmen Montes De Oca RN  Outcome: Ongoing     Problem: Safety:  Goal: Free from accidental physical injury  Description: Free from accidental physical injury  Outcome: Ongoing  Goal: Free from intentional harm  Description: Free from intentional harm  Outcome: Ongoing     Problem: Daily Care:  Goal: Daily care needs are met  Description: Daily care needs are met  4/28/2020 1006 by Silva Lopez RN  Outcome: Ongoing  4/28/2020 0446 by Carmen Montes De Oca RN  Outcome: Ongoing     Problem: Pain:  Goal: Patient's pain/discomfort is manageable  Description: Patient's pain/discomfort is manageable  4/28/2020 1006 by Silva Lopez RN  Outcome: Ongoing  4/28/2020 0446 by Carmen Montes De Oca RN  Outcome: Ongoing  Note: Pt will use pain scale 0-10 appropriately. Will assess for pain frequently. Will medicate with pain meds as ordered. No current complaints of pain       Problem: Skin Integrity:  Goal: Skin integrity will stabilize  Description: Skin integrity will stabilize  4/28/2020 1006 by Silva Lopez RN  Outcome: Ongoing  4/28/2020 0446 by Carmne Montes De Oca RN  Outcome: Ongoing  Note: Pt remain free of skin breakdown during hospital stay.  Pt encouraged to self

## 2020-04-28 NOTE — CARE COORDINATION
[facility]    Referral received from  to follow for home care services. I will follow for needs, and speak with patient to verify demos.       Aliya Lee RN, BSN CTN  [facility] [phone]

## 2020-04-28 NOTE — PROGRESS NOTES
Pharmacy-heparin Drip  Ptt= 59.6 secs, goal 49-76 secs  Continue with same rate of 5.1ml/hr. Ptt daily, given 2 consecutive goal.  Graciela Pappas Pharm D 4/28/20202:09 PM  .      .

## 2020-04-28 NOTE — FLOWSHEET NOTE
04/28/20 0800   Vital Signs   Temp 97.5 °F (36.4 °C)   Temp Source Oral   Pulse 60   Heart Rate Source Monitor   Resp 16   BP (!) 102/59   BP Location Right Arm   BP Upper/Lower Upper   MAP (mmHg) 73   Patient Position Semi fowlers   Level of Consciousness 0   MEWS Score 1   Patient Currently in Pain Denies   Oxygen Therapy   SpO2 99 %   O2 Device None (Room air)   Patient resting quietly in bed. No s/s of distress noted. Shift assessment complete, see flow sheet. Denies needs at this time. Call light within reach. Will continue to monitor.

## 2020-04-28 NOTE — PROGRESS NOTES
Nutrition Assessment    Type and Reason for Visit: Initial, Positive Nutrition Screen(+ screen for MST = 2)    Nutrition Recommendations:   1. Continue CCC-4 + cardiac + no caffeine diet order. 2. Trial mixed berry magic cup and chocolate Glucerna with dinner this evening. 3. Monitor appetite, meal intake, and acceptance/intake of ONS (will order ONS more consistently if patient likes one or both). 4. Monitor nutrition-related labs, bowel function, and weight trends. Nutrition Assessment: patient is nutritionally compromised AEB decreased appetite + poor po intake PTA and respiratory dysfunction PTA and he is at risk for further compromise d/t patient reported ongoing loss of appetite, po intake < 50% x 3 meals thus far, and altered nutrition-related labs; will continue cardiac + CCC-4 + no caffeine diet order and will have patient trial mixed berry magic cup and chocolate Glucerna with dinner this evening    Malnutrition Assessment:  · Malnutrition Status: Meets the criteria for severe malnutrition  · Context: Acute illness or injury  · Findings of the 6 clinical characteristics of malnutrition (Minimum of 2 out of 6 clinical characteristics is required to make the diagnosis of moderate or severe Protein Calorie Malnutrition based on AND/ASPEN Guidelines):  1. Energy Intake-Less than or equal to 50% of estimated energy requirement, Greater than or equal to 7 days    2. Weight Loss-No significant weight loss, (since June 2019 (if past weights are accurate))  3. Fat Loss-Moderate subcutaneous fat loss, Orbital, Triceps  4. Muscle Loss-Moderate muscle mass loss, Clavicles (pectoralis and deltoids), Temples (temporalis muscle)  5. Fluid Accumulation-No significant fluid accumulation,    6.   Strength-Not measured    Nutrition Risk Level: High    Nutrient Needs:  · Estimated Daily Total Kcal: 1770 - 1888 kcals based on 30-32 kcals/kg/CBW  · Estimated Daily Protein (g): 71 - 89 g protein based on 1.2-1.5 options to aid in increased po intake - patient likes vanilla ice cream and apple juice  · Wound Type: None  · Current Nutrition Therapies:  · Oral Diet Orders: Carb Control 4 Carbs/Meal, Cardiac(no caffeine)   · Oral Diet intake: 1-25%  · Oral Nutrition Supplement (ONS) Orders: None  · ONS intake: (none ordered)  · Anthropometric Measures:  · Ht: 6' (182.9 cm)   · Current Body Wt: 130 lb (59 kg)(actual; bed scale)  · Admission Body Wt: 127 lb 9 oz (57.9 kg)(actual; bed scale)  · Usual Body Wt: (patient unable to state UBW; per past weights, 120's to 130's)  · Weight Change:  no significant weight loss since June 2019 (if past weights are accurate)  · Ideal Body Wt: 178 lb (80.7 kg), % Ideal Body 73%  · BMI Classification: BMI <18.5 Underweight(17.7)    Nutrition Interventions:   Continue current diet(+ trial mixed berry magic cup and chocolate Glucerna with dinner meal this evening)  Continued Inpatient Monitoring, Coordination of Care, Coordination of Community Care    Nutrition Evaluation:   · Evaluation: Goals set   · Goals: patient will consume at least 50% of his meals on CCC-4 + cardiac + no caffeine diet order x 3 meals per day and he will accept and trial mixed berry magic cup + chocolate Glucerna with dinner this evening    · Monitoring: Meal Intake, Diet Tolerance, Skin Integrity, Mental Status/Confusion, Weight, Pertinent Labs, Monitor Bowel Function      Electronically signed by Eveline Snellen, RD, LD on 4/28/20 at 2:20 PM EDT    Contact Number: [phone]

## 2020-04-28 NOTE — PROGRESS NOTES
Progress Note    Admit Date:  4/26/2020    Subjective:  Mr. Sintia Puga denies any concerns, no pain    Objective:   Patient Vitals for the past 4 hrs:   BP Temp Temp src Pulse Resp SpO2   04/28/20 0800 (!) 102/59 97.5 °F (36.4 °C) Oral 60 16 99 %            Intake/Output Summary (Last 24 hours) at 4/28/2020 0938  Last data filed at 4/28/2020 0636  Gross per 24 hour   Intake 1802.21 ml   Output 700 ml   Net 1102.21 ml       Physical Exam:  Gen: No distress. Alert. Elderly  male, appears confused, laying on side, legs drawn up  Eyes:  No sclera icterus. No conjunctival injection. Neck: Trachea midline. Resp: No accessory muscle use. No crackles. No wheezes. No rhonchi. On RA  CV: Regular rate. Regular rhythm. No murmur. No rub. No edema. GI: Soft, Non-tender. Non-distended. Normal bowel sounds. Skin: Warm and dry. No rash on exposed extremities. Neuro: Awake. Grossly nonfocal    Psych: Oriented to self only, able to recall month but not year or president      I Shay Avilez have reviewed the chart on Mikel Burk and personally interviewed and examined patient, reviewed the data (labs and imaging) and after discussion with my PA formulated the plan. Agree with note with the following edits. HPI:     I reviewed the patient's Past Medical History, Past Surgical History, Medications, and Allergies. Physical exam:    /60   Pulse 60   Temp 97.5 °F (36.4 °C) (Oral)   Resp 16   Ht 6' (1.829 m)   Wt 130 lb (59 kg)   SpO2 99%   BMI 17.63 kg/m²     Gen: No distress. Alert. Eyes: PERRL. No sclera icterus. No conjunctival injection. ENT: No discharge. Pharynx clear. Neck: Trachea midline. Normal thyroid. Resp: No accessory muscle use. No crackles. No wheezes. No rhonchi. No dullness on percussion. CV: Regular rate. Regular rhythm. No murmur or rub. No edema. GI: Non-tender. Non-distended. No masses. No organomegaly. Normal bowel sounds. No hernia.    Skin: Warm and dry. No nodule on exposed extremities. No rash on exposed extremities. Lymph: No cervical LAD. No supraclavicular LAD. M/S: No cyanosis. No joint deformity. No clubbing. Neuro: Awake. Oriented only to place           AILIN Paulson      Scheduled Meds:   carbidopa-levodopa  1.5 tablet Oral TID    donepezil  10 mg Oral Nightly    pravastatin  40 mg Oral Daily    sodium chloride flush  10 mL Intravenous 2 times per day    aspirin  81 mg Oral Daily    insulin lispro  0-12 Units Subcutaneous TID WC    insulin lispro  0-6 Units Subcutaneous Nightly       Continuous Infusions:   dextrose      heparin (porcine) 5.1 mL/hr (04/28/20 0042)    sodium chloride 75 mL/hr at 04/28/20 0243       PRN Meds:  sodium chloride flush, acetaminophen **OR** acetaminophen, polyethylene glycol, glucose, dextrose, glucagon (rDNA), dextrose, heparin (porcine), heparin (porcine), prochlorperazine, perflutren lipid microspheres      Data:  CBC:   Recent Labs     04/26/20 2200 04/28/20  0717   WBC 5.4 3.7*   HGB 13.9 11.1*   HCT 43.1 34.3*   MCV 95.4 95.6    145     BMP:   Recent Labs     04/27/20  0932 04/27/20  1458 04/28/20  0717   * 150* 147*   K 4.4 4.3 4.0   * 117* 113*   CO2 25 25 25   PHOS 3.2  --  2.5   BUN 99* 86* 58*   CREATININE 1.2 1.2 0.9     LIVER PROFILE:   Recent Labs     04/26/20 2200   AST 38*   ALT 6*   BILITOT 0.3   ALKPHOS 78     PT/INR:   Recent Labs     04/26/20 2200   PROTIME 11.3   INR 0.97     CULTURES  None     RADIOLOGY    CT Head WO Contrast   Final Result   No acute intracranial abnormality. XR CHEST PORTABLE   Final Result   No acute process. TTE 4/26/2020  Summary   Technically difficult examination due to patient inability to lay flat. LV systolic function is severely reduced with a visually estimated EF of   25-30 %. EF estimated by Kim's method at 24 %. The left ventricle is normal in size with normal wall thickness.    Severe global QTc  -  486 ms  -  avoid qt prolonging agents as able.      Moderate PCM  Nutrition consult     Code Status  - Pt's daughter Marjorie Sever wants clarification and further explanation regarding various code status options as pt's wife passed & there was uncertainty regarding her code status at her time of passing  - will place palliative care consult for code status discussion  - pt will remain full code at this time  - palliative care spoke with daughter (POA) who wishes for pt to remain full code     DVT Prophylaxis: heparin gtt  Diet: DIET CARDIAC; Carb Control: 4 carb choices (60 gms)/meal; No Caffeine  Code Status: Full Code     Dispo: cont care    Kati Finley PA-C 9:56 AM 4/28/2020      1. Acute kidney injury secondary to dehydration. Continue half-normal saline. Creatinine declining. Nephrology following. 2.  End STEMI. History of coronary disease status post PCI. Cardiology consult. Continue aspirin, heparin drip and statin. Echocardiogram showed EF of 25 to 30% with severe global hypokinesis. ALEXUS Paulson.

## 2020-04-28 NOTE — PROGRESS NOTES
Occupational Therapy Daily Treatment Note    Unit: PCU  Date:  4/28/2020  Patient Name:    Miguel Harman  Admitting diagnosis:  MING (acute kidney injury) [facility]) [N17.9]  Admit Date:  4/26/2020  Precautions/Restrictions:    fall risk, IV, bed/chair alarm, liu catheter , telemetry and telesitter    Discharge Recommendations: Home with 24/7 assist and home therapy  DME needs for discharge: RW and Shower Chair       Therapy recommendations for staff:   Assist of 1 (minimal assist) with use of rolling walker (RW) for all transfers to/from BSC/chair with gait belt     AM-PAC Score: AM-PAC Inpatient Daily Activity Raw Score: 15  Home Health S4 Level: Level 1- Standard       Treatment Time:  9878-9710  Treatment number:  2   Total Treatment Time:  40  minutes      Subjective:  Pt in the bed and willing to work with OT    Pain   Yes  Rating: mild  Location:Pt did not state when ask 2x   Pain Medicine Status: No request made      Bed Mobility:   Supine to Sit:  Modified Independent  Sit to Supine:  Not Tested  Rolling:           Not Tested  Scooting:        Supervision    Transfer Training: nurse cleared pt to sit up in the recliner chair. Sit to stand:   CGA  Stand to sit:  CGA  Bed to Chair:  Min A and with use of RW  Bed to BSC:   Min A and with use of RW  Standard toilet:   Not Tested    Activity Tolerance   Pt completed therapy session with decreased endurance  SpO2: attempted to get a reading however un able with 2 different pulse ox. Pt min SOB from transfers. ADL Training:   Upper body dressing:  Not Tested  Upper body bathing:  Supervision to bathe face and hands.  Pt needs VC to wash all areas   Lower body dressing:  Max A to doff depends over hips and to don depends over hips when using BSC   Lower body bathing:  Not Tested  Toileting:   Min A and with use of RW  Grooming/Hygiene:  Not Tested    Therapeutic Exercise:   Scapular protraction:  x10x  Shoulder shrugs:  x10x  Pt has decreased attention to task and needs VC to continue exercise  Patient Education:   Safe RW use/hand placement  Instruction in UE exercises   Positioning Needs:   Up in chair, call light and needs in reach. Alarm Set    Family Present:  No    Assessment:   The pt continues to be confused and has decreased attention span to activity. The pt was able to state his  and that he was in [facility]. The pt unable to state the month or yr. The pt was min assist for transfers with RW needing VC as well for safety. Pt had a shuffling gait and attempted to sit prior to safely positioning self in front of the chair. Pt unable to have a bowel movement once on the [facility]. The pt was max assist for donning and doffing depends over hips. Attempted UE exercises with VC to stay focused on exercise. Alarm on the chair and pt given a warm blanket and oriented pt again to call light to use for assistance. Tele sitter in place as well. GOALS  To be met in 3 Visits:  1). Bed to toilet/BSC: SBA and with use of RW     To be met in 5 Visits:  1). Supine to Sit: Modified Independent  2). Upper Body Bathing:  Supervision  3). Lower Body Bathing:  CGA  4). Upper Body Dressing: Supervision  5). Lower Body Dressing: CGA  6).  Pt to abhi UE exs x 15 reps      Plan: cont with POC    Melissa Paul OTR/L [id number]        If patient discharges from this facility prior to next visit, this note will serve as the Discharge Summary

## 2020-04-28 NOTE — PROGRESS NOTES
Pharmacy-heparin Drip  Ptt= 56.8 secs, goal 49-76 secs  Continue with same rate of 5.1ml/hr.  Next Ptt at 1400 today  Nicole CABALLERO 4/28/202011:25 AM  .

## 2020-04-28 NOTE — CARE COORDINATION
Case Management Assessment  Initial Evaluation    Date/Time of Evaluation: 4/28/2020 10:02 AM  Assessment Completed by: Viky Neil    Patient Name: Zoë Oconnell  YOB: 1935  Diagnosis: MING (acute kidney injury) [facility]) [N17.9]  Date / Time: 4/26/2020  9:41 PM  Admission status/Date:04/27/20  Chart Reviewed: Yes      Patient Interviewed: No   Family Interviewed:  Yes - Dtr (Misty)      Hospitalization in the last 30 days:  No    Contacts  :     Relationship to Patient:   Phone Number:    Alternate Contact:     Relationship to Patient:     Phone Number:    Met with:    Current PCP  Luigi CNP    Financial  Commercial  Precert required for SNF : Y, N        3 night stay required - Y, N    ADLS  Support Systems: Children, Family Members  Transportation: family    Meal Preparation: family    Housing  Home Environment: house  Steps: ? Plans to Return to Present Housing: Yes  Other Identified Issues: Fabian Whitten  Currently active with [address] : No  Type of Home Care Services: None  Passport/Waiver : No  :                      Phone Number:    Passport/Waiver Services: n/a          Durable Medical Equipment   DME Provider: n/a  Equipment: n/aWalker_x__Cane_x__RTS___ BSC___Shower Chair___  02__ at ____Liter(s)---Uses________HHN___ CPAP___ BiPap___ Hospital Bed___W/C__x__Other________      Has Home O2 in place on admit:  No  Informed of need to bring portable home O2 tank on day of discharge for nursing to connect prior to leaving:   Not Indicated  Verbalized agreement/Understanding:   Not Indicated    Community Service Affiliation  Dialysis:  No    · Name:  · Location  · Dialysis Schedule:  · Phone:   · Fax:     Outpatient PT/OT: No    Cancer Center: No     CHF Clinic: No     Pulmonary Rehab: No  Pain Clinic: No  Community Mental Health: No    Wound Clinic: No     COVID SCREENING: No       Other: n/a    The Plan for Transition of Care is related to the following treatment goals: Return home with dtr/[facility]/Sn/Aide/Pt/Ot    The Patient and/or patient representative Dtr was provided with a choice of provider and agrees   with the discharge plan. [x] Yes [] No    Freedom of choice list was provided with basic dialogue that supports the patient's individualized plan of care/goals, treatment preferences and shares the quality data associated with the providers. [] Yes [x] No Dtr declines list    DISCHARGE PLAN: Pt with Dementia. Confused. Called dtr (Misty). Pt lives with dtr and the plaln is to return. Uses a cane and/or walker. Dtr would like [facility] to follow pt in the home. Declined [facility] list at this time. Consult to [facility] for Code status discussion  Explained Case Management role/services.

## 2020-04-28 NOTE — CONSULTS
4-27-20 (0103) aptt 44.2. Please increase heparin drip to 5.1 ml/hr. Next aptt 0700 on 4-28-20. [facility]. .  4/28/2020 12:24 AM

## 2020-04-28 NOTE — PROGRESS NOTES
[facility]   Progress Note  Cardiology      HPI: Seeing Mr. Rc Shearer today for f/u NSTEMI, ARF, and hx CAD. He appears more alert today and denies any specific complaints. Physical Examination:    Vitals:    04/28/20 0334   BP: 108/60   Pulse: 68   Resp: 18   Temp: 97.5 °F (36.4 °C)   SpO2: 100%          Constitutional and General Appearance: NAD   Respiratory:  · Normal excursion and expansion without use of accessory muscles  · Resp Auscultation: Normal breath sounds without dullness  Cardiovascular:  · The apical impulses not displaced  · Heart tones are crisp and normal  · Cervical veins are not engorged  · The carotid upstroke is normal in amplitude and contour without delay or bruit  · Normal S1S2, No S3, No Murmur  · Peripheral pulses are symmetrical and full  · There is no clubbing, cyanosis of the extremities. · No edema  · Pedal Pulses: 2+ and equal   Abdomen:  · No masses or tenderness  · Liver/Spleen: No Abnormalities Noted  Neurological/Psychiatric:  · Alert and oriented in all spheres  · Moves all extremities well  · No abnormalities of mood, affect, memory, mentation, or behavior are noted  Skin: warm and dry      Lab Results   Component Value Date    WBC 5.4 04/26/2020    HGB 13.9 04/26/2020    HCT 43.1 04/26/2020    MCV 95.4 04/26/2020     04/26/2020     Lab Results   Component Value Date    CREATININE 1.2 04/27/2020    BUN 86 (HH) 04/27/2020     (H) 04/27/2020    K 4.3 04/27/2020     (H) 04/27/2020    CO2 25 04/27/2020     Lab Results   Component Value Date    INR 0.97 04/26/2020    PROTIME 11.3 04/26/2020        [facility] 10/7/2010   PCI stent to 1st OMB      [facility] 9/2010   PCI to RCA with DANNY      ECHO 4/6/11  Left ventricle: The cavity size was mildly dilated. Wall thickness    was normal. Systolic function was normal. The estimated ejection    fraction was in the range of 50% to 55%. Wall motion was normal;    there were no regional wall motion abnormalities. Doppler    parameters are consistent with abnormal left ventricular    relaxation (grade 1 diastolic dysfunction). EKG 4/26/20  Sinus rhythm with Premature atrial complexesFirst degree AV blockLeft bundle branch blockAbnormal ECGNo previous ECGs availableConfirmed by FAITH DUMAS MD ([id number]) on 4/27/2020 7:33:34 AM      ECHO 4/27/20 Summary   Technically difficult examination due to patient inability to lay flat. LV systolic function is severely reduced with a visually estimated EF of 25-30 %. EF estimated by Kim's method at 24 %. The left ventricle is normal in size with normal wall thickness. Severe global hypokinesis with more prominent inferior HK on certain views. Elevated left atrial pressures with a septal E/e' ratio of 16.2. Right ventricular systolic function appears moderately reduced. Pacemaker/ICD wire visualized in right-sided chambers. Systolic pulmonary artery pressure (SPAP) is normal and estimated at 29mmHg   (right atrial pressure 8mmHg).    Assessment:  Atiya Parada is a 80 y.o. male who presented to [facility] 4/26/20 with reported SOB and fingers turning blue. He follows [facility]berg and Buford Barthel for OP cardiology--LOV 3/6/20 Dr. Majo Agarwal. 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 . Most recent ECHO 4/6/11 EF 50-55%. Wall motion  normal; no regional wall motion abnormalities. grade 1 DD. Note patient is poor historian for specific history due to dementia. Family brought to ER for c/o SOB and cyanosis of finger. Note possible syncopal episode 1 week ago and EMS called but vitals stable and not brought to ER. Reported poor po intake x 1 week. Admit EKG 4/26/20 showed NSR w/ PAC; 1st degree AV block;  LBBB (no change from 11/10). Note Vikas 1.24, 1.02, 0.96. Pro-BNP 1,648.  , Cr 1.8; note CXR no acute process; note CT head no intracranial abnormality. He was placed on heparin gtt and admitted to PCU. Patient is oriented to person, place, and time but does not know why he is here. Diagnosis of elevated Vikas c/w NSTEMI in elderly male with acute renal failure and hx of CAD s/p stents to RCA and OM1 remotely and . Based on ECHO new diagnosis of ischemic CM. Baseline LBBB EKG old and he denies any CP but history may be unreliable.      Recs:  1. Continue unfractionated heparin gtt x 48 hours until noon Wednesday, 4/29/20.  2. Continue pravachol 40mg qhs and baby aspirin qd.  3. Start dose toprol XL 12.5mg daily for NSTEMI, hx CAD, and ischemic CM. Will try to restart low dose ACE-I once renal fcn improved and if BP will tolerate. 4. I personally reviewed ECHO from 4/28/20 showing EF=25-30%; more prominent inferior HK; RV dysfunction; elevated left atrial pressures  5. IVF for rehydration per nephrology. BUN/Cr=58/0.9 (86/1.2 prior)  6. Would treat conservatively now given elderly age, ARF, and dementia/Parkinson's.  I would not pursue ischemia w/u with stress as would not recommend cath if abnormal. Note I spoke to POA daughter who agrees with plan and thinks her dad too frail for any invasive testing.        Patient Active Problem List   Diagnosis    Elevated PSA    LBBB (left bundle branch block)    Post PTCA    Bruit    Pacemaker    Mixed hyperlipidemia    Essential hypertension    Type 2 diabetes mellitus without complication, without long-term current use of insulin ([address]    New onset seizure ([address]    Leukopenia    Cognitive impairment    MING (acute kidney injury) ([address]    NSTEMI (non-ST elevated myocardial infarction) ([address]    Coronary artery disease involving native coronary artery of native heart with angina pectoris ([address]    Elevated troponin

## 2020-04-28 NOTE — PROGRESS NOTES
Report given to Kolby Wiggins RN at bedside for transfer of care. Pt denies needs at this time, call light within reach.

## 2020-04-28 NOTE — PROGRESS NOTES
N/A.  Pattern: N/A    Therapeutic Exercise all completed bilaterally unless indicated 10 reps x 2 sets  Ankle pumps:  Hip abd/add against manual resistance  LAQ:   Marching: In supported standing alternate toe taps: In supported standing alternate arm raises: In supported standing step forward and back:  Sit to stand transfers for only 5 reps using RW    Balance  Static Sitting:  Good - ; Supervision   Tolerance:   Dynamic Sitting:  Fair +; SBA   Tolerance:   Static Standing: Fair -; Min A   Tolerance: 1 min  Dynamic Standing: Fair -; Min A   Tolerance: 30-40 sec    Patient Education      Role of PT, POC, Discharge recommendations, DC recommendations, safety awareness, transfer techniques, pursed lip breathing, energy conservation, pacing activity and calling for assist with mobility. Positioning Needs       Pt instructed and educated on use of call light to call for assist if desiring to get up or change position, call light handed to pt and needs within reach, Pt sitting up in chair, alarm set and call light provided and all needs within reach. ROM Measurements N/A  Knee Flexion:  Knee Extension:     Activity Tolerance   Pt completed therapy session with SOB noted with sit to stand and standing activities. SpO2: 89 to 93% with activity  HR: 88 bpm    Assessment :  Patient needed regular rest breaks of 30 sec to 1min after each exercise set and activity. Patient showed 1 min of standing tolerance and could not tolerate ambulation due to fatigue. Patient had difficulty to maintain upright posture in standing with RW. Patient will continue to benefit from skilled PT services to improve the physical function. Goals (all goals ongoing unless otherwise indicated)  To be met in 3 visits:  1). Independent with LE Ex x 10 reps     To be met in 6 visits:  1). Supine to/from sit: Modified Independent  2). Sit to/from stand: SBA  3). Bed to chair: SBA  4).   Gait: Ambulate 50 ft.  with  SBA  and use of LRAD (least restrictive assistive device)  5). Tolerate B LE exercises 3 sets of 10-15 reps  6). Ascend/descend 2 steps with Min A with use of one hand rail and No AD. Plan   Continue with plan of care. Signature: Christy Croft MS PT, # [id number]      If patient discharges from this facility prior to next visit, this note will serve as the Discharge Summary.

## 2020-04-28 NOTE — DISCHARGE INSTR - COC
Continuity of Care Form    Patient Name: Kathryn Dave   :    MRN:  [id number]    Admit date:  2020  Discharge date:  20    Code Status Order: Full Code   Advance Directives:     Admitting Physician:  Grant Courtney MD  PCP: TEMITOPE Valderrama CNP    Discharging Nurse:  [address] [facility] Unit/Room#: /4069-20  Discharging Unit Phone Number: [phone]    Emergency Contact:   Extended Emergency Contact Information  Primary Emergency Contact: Cody Alvarez [address] Phone: [phone]  Mobile Phone: [phone]  Relation: Child  Secondary Emergency Contact: Kemal Self  Address: Shantel Mckinney           [phone]   [address] Phone: [phone]  Mobile Phone: [phone]  Relation: Child    Past Surgical History:  Past Surgical History:   Procedure Laterality Date    COLONOSCOPY      EYE SURGERY      HERNIA REPAIR         Immunization History:   Immunization History   Administered Date(s) Administered    Influenza 2013    Influenza Virus Vaccine 2003, 2012    Influenza Whole 2009    Influenza, High Dose (Fluzone 65 yrs and older) 10/07/2014, 10/23/2015, 2016, 2017, 2018    Influenza, Triv, inactivated, subunit, adjuvanted, IM (Fluad 65 yrs and older) 2019    Pneumococcal Conjugate 13-valent (Pqyebxc26) 2015    Pneumococcal Polysaccharide (Uayrbbrot35) 2005, 2012    Td (Adult), 5 Lf Tetanus Toxoid, Pf (Tenivac, Decavac) 2006    Td, unspecified formulation 2006    Zoster Live (Zostavax) 2010       Active Problems:  Patient Active Problem List   Diagnosis Code    Elevated PSA R97.20    LBBB (left bundle branch block) I44.7    Post PTCA Z98.61    Bruit R09.89    Pacemaker Z95.0    Mixed hyperlipidemia E78.2    Essential hypertension I10    Type 2 diabetes mellitus without complication, without long-term current use of insulin (formerly [facility]) E11.9    New onset seizure ([facility] R56.9    Leukopenia D72.819    Cognitive impairment R41.89    MING (acute kidney injury) ([facility] N17.9    NSTEMI (non-ST elevated myocardial infarction) (formerly [facility]) I21.4    Coronary artery disease involving native coronary artery of native heart with angina pectoris (formerly [facility]) I25.119    Elevated troponin R79.89    Ischemic cardiomyopathy I25.5       Isolation/Infection:   Isolation          No Isolation        Patient Infection Status     None to display          Nurse Assessment:  Last Vital Signs: BP (!) 102/59   Pulse 60   Temp 97.5 °F (36.4 °C) (Oral)   Resp 16   Ht 6' (1.829 m)   Wt 130 lb (59 kg)   SpO2 99%   BMI 17.63 kg/m²     Last documented pain score (0-10 scale):    Last Weight:   Wt Readings from Last 1 Encounters:   04/28/20 130 lb (59 kg)     Mental Status:  oriented    IV Access:  - None    Nursing Mobility/ADLs:  Walking   Assisted  Transfer  Assisted  Bathing  Assisted  Dressing  Assisted  Toileting  Assisted  Feeding  Assisted  Med Admin  Assisted  Med Delivery   whole    Wound Care Documentation and Therapy:        Elimination:  Continence:   · Bowel: Yes  · Bladder: Yes  Urinary Catheter: None   Colostomy/Ileostomy/Ileal Conduit: No       Date of Last BM: 4/28/20    Intake/Output Summary (Last 24 hours) at 4/28/2020 1021  Last data filed at 4/28/2020 0636  Gross per 24 hour   Intake 1802.21 ml   Output 700 ml   Net 1102.21 ml     I/O last 3 completed shifts: In: 1802.2 [P.O.:340; I.V.:1462.2]  Out: 1200 [Urine:1200]    Safety Concerns:     None    Impairments/Disabilities:      None    Nutrition Therapy:  Current Nutrition Therapy:   - Oral Diet:  General    Routes of Feeding: Oral  Liquids: Thin Liquids  Daily Fluid Restriction: no  Last Modified Barium Swallow with Video (Video Swallowing Test): not done    Treatments at the Time of Hospital Discharge:   Respiratory Treatments:   Oxygen Therapy:  is not on home oxygen therapy.   Ventilator: - No ventilator support    Rehab Therapies: Physical Therapy, Occupational Therapy HHA and Nurse  Weight Bearing Status/Restrictions: No weight bearing restirctions  Other Medical Equipment (for information only, NOT a DME order): Other Treatments: HOME HEALTH CARE: LEVEL 4 SICK   Home health agency to establish plan of care for patient over 60 day period  Nursing:  Initial home SN evaluation visit to occur within 24-48 hours of hospital discharge  [facility] nursing visits daily, then QOD with progression as warranted for:  1)  medication management  2)  VS and clinical assessment  3)  S&S chronic disease exacerbation education + when to contact MD/NP  4)  care coordination  Medication Reconciliation during 1st SN visit  Nurse telephone visit on the days that visit is not being made in person for first 30 days                               PT/OT/Speech   Evaluations in home within 24-48 hours of hospital discharge to include DME and home safety   Frontload therapy 5 days, then 3x a week   OT evaluation for [address] needs for personal care    Palliative Care referral within 5 days of hospital discharge    evaluation within 24-48 hours of hospital discharge to evaluate resources & insurance for potential  [place], [place], LTC, and Medicaid options   Dietician evaluation within 5 days of hospital discharge   PCP visit within 3 days of hospital discharge, followed by PCP visit @ 10 days, and 21 days   TeleHealth (Home care Vitals) if patient agreeable and qualifies.          Patient's personal belongings (please select all that are sent with patient):  Glasses    RN SIGNATURE:  Electronically signed by Shukri Chen RN on 4/30/20 at 2:55 PM EDT    CASE MANAGEMENT/SOCIAL WORK SECTION    Inpatient Status Date:04/27/20    Readmission Risk Assessment Score:  Readmission Risk              Risk of Unplanned Readmission:        17           Discharging to Facility/ Agency   Name:  [facility] care    Address: [address] [place] [address], [address] [place] [address]  Phone: [phone]  Fax: [phone]        / signature: Electronically signed by Gloria Vick RN on 4/30/20 at 2:01 PM EDT    PHYSICIAN SECTION    Prognosis: Fair    Condition at Discharge: Stable    Rehab Potential (if transferring to Rehab):  Fair    Recommended Labs or Other Treatments After Discharge:     Physician Certification: I certify the above information and transfer of Blu Fry  is necessary for the continuing treatment of the diagnosis listed and that he requires Home Care    Update Admission H&P: No change in H&P    PHYSICIAN SIGNATURE:  Electronically signed by Danielle Quiñones RN on 4/30/20 at 2:01 PM EDT

## 2020-04-28 NOTE — PLAN OF CARE
Nutrition Problem: Severe malnutrition  Intervention: Food and/or Nutrient Delivery: Continue current diet(+ trial mixed berry magic cup and chocolate Glucerna with dinner meal this evening)  Nutritional Goals: patient will consume at least 50% of his meals on CCC-4 + cardiac + no caffeine diet order x 3 meals per day and he will accept and trial mixed berry magic cup + chocolate Glucerna with dinner this evening

## 2020-04-28 NOTE — PROGRESS NOTES
[facility]       Progress Note    Sub/interval history  resting    Last 24 h uop 1.2l    ROS: No chest pain/shortness of breath/fever/nausea/vomiting  PSFH: No visitor    Scheduled Meds:   metoprolol succinate  12.5 mg Oral Daily    carbidopa-levodopa  1.5 tablet Oral TID    donepezil  10 mg Oral Nightly    pravastatin  40 mg Oral Daily    sodium chloride flush  10 mL Intravenous 2 times per day    aspirin  81 mg Oral Daily    insulin lispro  0-12 Units Subcutaneous TID WC    insulin lispro  0-6 Units Subcutaneous Nightly     Continuous Infusions:   dextrose      heparin (porcine) 5.1 mL/hr (04/28/20 0042)    sodium chloride 75 mL/hr at 04/28/20 0243     PRN Meds:.sodium chloride flush, acetaminophen **OR** acetaminophen, polyethylene glycol, glucose, dextrose, glucagon (rDNA), dextrose, heparin (porcine), heparin (porcine), prochlorperazine, perflutren lipid microspheres    Objective/     Vitals:    04/27/20 2039 04/28/20 0333 04/28/20 0334 04/28/20 0800   BP: (!) 97/50  108/60 (!) 102/59   Pulse: 62  68 60   Resp: 18  18 16   Temp: 97.9 °F (36.6 °C)  97.5 °F (36.4 °C) 97.5 °F (36.4 °C)   TempSrc: Oral  Oral Oral   SpO2: 99%  100% 99%   Weight:  130 lb (59 kg)     Height:         24HR INTAKE/OUTPUT:      Intake/Output Summary (Last 24 hours) at 4/28/2020 1126  Last data filed at 4/28/2020 0636  Gross per 24 hour   Intake 1802.21 ml   Output 700 ml   Net 1102.21 ml     Constitutional:  Alert, awake, no apparent distress  Cardiovascular:  S1, S2 without m/r/g  Respiratory:  CTA B without w/r/r  Abdomen: +bs, soft, nt  Ext: no LE edema    Data/  Recent Labs     04/26/20  2200 04/28/20  0717   WBC 5.4 3.7*   HGB 13.9 11.1*   HCT 43.1 34.3*   MCV 95.4 95.6    145     Recent Labs     04/27/20  0932 04/27/20  1458 04/28/20  0717   * 150* 147*   K 4.4 4.3 4.0   * 117* 113*   CO2 25 25 25   GLUCOSE 198* 77 111*   PHOS 3.2  --  2.5   BUN 99* 86* 58*   CREATININE

## 2020-04-28 NOTE — FLOWSHEET NOTE
Patient repositioned to left side. Telesitter and bed alarm remain in use. Pt verbalizes no complaints.      04/28/20 0334   Vital Signs   Temp 97.5 °F (36.4 °C)   Temp Source Oral   Pulse 68   Heart Rate Source Monitor   Resp 18   /60   BP Location Right Arm   BP Upper/Lower Upper   MAP (mmHg) 76   Level of Consciousness 0   MEWS Score 1   Patient Currently in Pain Denies   Oxygen Therapy   SpO2 100 %   O2 Device None (Room air)

## 2020-04-29 NOTE — PROGRESS NOTES
[address]   Progress Note  Cardiology      HPI: Seeing Mr. Mcdowell Franca today for f/u NSTEMI, ARF, and hx CAD. He appears more alert today and denies any specific complaints. Physical Examination:    Vitals:    04/29/20 0313   BP: 113/64   Pulse: 60   Resp: 16   Temp: 97.6 °F (36.4 °C)   SpO2: 95%          Constitutional and General Appearance: NAD   Respiratory:  · Normal excursion and expansion without use of accessory muscles  · Resp Auscultation: Normal breath sounds without dullness  Cardiovascular:  · The apical impulses not displaced  · Heart tones are crisp and normal  · Cervical veins are not engorged  · The carotid upstroke is normal in amplitude and contour without delay or bruit  · Normal S1S2, No S3, No Murmur  · Peripheral pulses are symmetrical and full  · There is no clubbing, cyanosis of the extremities. · No edema  · Pedal Pulses: 2+ and equal   Abdomen:  · No masses or tenderness  · Liver/Spleen: No Abnormalities Noted  Neurological/Psychiatric:  · Alert and oriented in all spheres  · Moves all extremities well  · No abnormalities of mood, affect, memory, mentation, or behavior are noted  Skin: warm and dry      Lab Results   Component Value Date    WBC 3.7 (L) 04/29/2020    HGB 10.5 (L) 04/29/2020    HCT 31.4 (L) 04/29/2020    MCV 96.4 04/29/2020     (L) 04/29/2020     Lab Results   Component Value Date    CREATININE 0.8 04/29/2020    BUN 39 (H) 04/29/2020     04/29/2020    K 3.9 04/29/2020     04/29/2020    CO2 24 04/29/2020     Lab Results   Component Value Date    INR 0.97 04/26/2020    PROTIME 11.3 04/26/2020        [facility] 10/7/2010   PCI stent to 1st OMB      [facility] 9/2010   PCI to RCA with DANNY      ECHO 4/6/11  Left ventricle: The cavity size was mildly dilated. Wall thickness    was normal. Systolic function was normal. The estimated ejection    fraction was in the range of 50% to 55%. Wall motion was normal;    there were no regional wall motion abnormalities. CT head no intracranial abnormality. He was placed on heparin gtt and admitted to PCU. Patient is oriented to person, place, and time but does not know why he is here. Diagnosis of elevated Vikas c/w NSTEMI in elderly male with acute renal failure and hx of CAD s/p stents to RCA and OM1 remotely and . Based on ECHO new diagnosis of ischemic CM. Baseline LBBB EKG old and he denies any CP but history may be unreliable.      Recs:  1. D/C unfractionated heparin gtt. Start plavix 75mg daily for DAPT given NSTEMI. Note H/H decreased but likely dilutional from IVF. No signs of bleeding  2. Continue pravachol 40mg qhs and baby aspirin qd.  3. Continue toprol XL 12.5mg qd and start lisinopril 2.5mg qd for NSTEMI, hx CAD, and ischemic CM. Stagger doses and I gave BP parameters. 4. I personally reviewed ECHO from 4/28/20 showing EF=25-30%; more prominent inferior HK; RV dysfunction; elevated left atrial pressures  5. IVF for rehydration per nephrology. BUN/Cr=39/0.8  (86/1.2 prior)  6. Plan is to treat conservatively now given elderly age, ARF, and dementia/Parkinson's. I would not pursue ischemia w/u with stress as would not recommend cath if abnormal. Note I spoke to POA daughter who agrees with plan and thinks her dad too frail for any invasive testing. I called and left  for Dr. Jacobo Kearns. Daughter to call and make f/u appt with [facility] cardiology. OK for d/c from cardiology when  doc feels ready. Signing off.  Thanks.        Patient Active Problem List   Diagnosis    Elevated PSA    LBBB (left bundle branch block)    Post PTCA    Bruit    Pacemaker    Mixed hyperlipidemia    Essential hypertension    Type 2 diabetes mellitus without complication, without long-term current use of insulin ([address]    New onset seizure ([address]    Leukopenia    Cognitive impairment    MING (acute kidney injury) ([address]    NSTEMI (non-ST elevated myocardial infarction) ([address]    Coronary artery disease involving native coronary artery of native heart with angina pectoris (HCC)    Elevated troponin    Ischemic cardiomyopathy    Dementia without behavioral disturbance (HCC)    Severe protein-calorie malnutrition ([facility]

## 2020-04-29 NOTE — FLOWSHEET NOTE
04/29/20 0923   Vital Signs   Temp 97 °F (36.1 °C)   Temp Source Oral   Pulse 61   Heart Rate Source Monitor   Resp 18   BP (!) 92/50   BP Location Right upper arm   BP Upper/Lower Upper   Level of Consciousness 0   MEWS Score 2   Patient Currently in Pain Denies   Oxygen Therapy   SpO2 99 %   O2 Device None (Room air)   Assessment completed and documented VSS, A paced on monitor and remains on RA. Patient alert to self and easily oriented.

## 2020-04-29 NOTE — FLOWSHEET NOTE
Pt is resting in bed, eyes closed. Respirations easy and even. No signs of distress noted. VSS. Bed locked in lowest position. Bed alarm is on and telesitter in use for patient safety. Call light within reach. Pt denies other needs. Will continue to monitor.       04/29/20 0313   Vital Signs   Temp 97.6 °F (36.4 °C)   Temp Source Oral   Pulse 60   Heart Rate Source Monitor   Resp 16   /64   BP Location Right Arm   BP Upper/Lower Upper   MAP (mmHg) 80   Level of Consciousness 0   MEWS Score 1   Patient Currently in Pain Denies   Oxygen Therapy   SpO2 95 %   O2 Device None (Room air)

## 2020-04-29 NOTE — CONSULTS
4-29-20 (0504) aptt 60.9. Please continue heparin drip at 5.1 ml/hr. Next aptt 0600 on 4-30-20. [facility]. Ph.  4/29/2020 6:14 AM

## 2020-04-29 NOTE — PROGRESS NOTES
Education:   Safe RW use/hand placement  Instruction in UE exercises   Positioning Needs:   Up in chair, call light and needs in reach. Alarm Set    Family Present:  No    Assessment:  Pt declined LB dressing or other ADLS besides bathing face and hands. The pt did brush hair ind when given brush. The pt was max assist to unfasten depends when pt sitting in chair when pt ask to 'pee\". Pt was min assist to use the urninal. The pt had improved transfers today with decreased shuffling when ambulation. The pt was CGA with RW for transfer to chair. Attempted UE exercises with VC to stay focused on exercise. Alarm on the chair and pt given a warm blanket and oriented pt again to call light to use for assistance. Tele sitter in place as well. GOALS  To be met in 3 Visits:  1). Bed to toilet/BSC: SBA and with use of RW     To be met in 5 Visits:  1). Supine to Sit: Modified Independent- Goal MET  2). Upper Body Bathing:  Supervision  3). Lower Body Bathing:  CGA  4). Upper Body Dressing: Supervision  5). Lower Body Dressing: CGA  6).  Pt to abhi UE exs x 15 reps      Plan: cont with POC    Caprice Blank OTR/L [id number]        If patient discharges from this facility prior to next visit, this note will serve as the Discharge Summary

## 2020-04-29 NOTE — PROGRESS NOTES
Progress Note    Admit Date:  4/26/2020    Subjective:  Mr. Yoel Max denies any concerns. No chest pain or SOB. Objective:   Patient Vitals for the past 4 hrs:   BP Temp Temp src Pulse Resp SpO2   04/29/20 0923 (!) 92/50 97 °F (36.1 °C) Oral 61 18 99 %            Intake/Output Summary (Last 24 hours) at 4/29/2020 1114  Last data filed at 4/29/2020 1113  Gross per 24 hour   Intake 1623 ml   Output 1500 ml   Net 123 ml       Physical Exam:  Gen: No distress. Alert. Elderly  male, appears confused, laying on side, legs drawn up  Eyes:  No sclera icterus. No conjunctival injection. Neck: Trachea midline. Resp: No accessory muscle use. No crackles. No wheezes. No rhonchi. On RA  CV: Regular rate. Regular rhythm. No murmur. No rub. No edema. GI: Soft, Non-tender. Non-distended. Normal bowel sounds. Skin: Warm and dry. No rash on exposed extremities. Neuro: Awake. Grossly nonfocal    Psych: Oriented to self only, able to recall month but not year or president      I DANIELLE Jacobo M.D.  have reviewed the chart on Blu Fry and personally interviewed and examined patient, reviewed the data (labs and imaging) and after discussion with my PA formulated the plan. Agree with note with the following edits. Physical exam:    BP (!) 92/50   Pulse 61   Temp 97 °F (36.1 °C) (Oral)   Resp 18   Ht 6' (1.829 m)   Wt 133 lb 8 oz (60.6 kg)   SpO2 99%   BMI 18.11 kg/m²     Gen: No distress. Alert. Eyes: PERRL. No sclera icterus. No conjunctival injection. ENT: No discharge. Pharynx clear. Neck: Trachea midline. Normal thyroid. Resp: No accessory muscle use. No crackles. No wheezes. No rhonchi. No dullness on percussion. CV: Regular rate. Regular rhythm. No murmur or rub. No edema. GI: Non-tender. Non-distended. No masses. No organomegaly. Normal bowel sounds. No hernia. Skin: Warm and dry. No nodule on exposed extremities. No rash on exposed extremities. Lymph: No cervical LAD.  No supraclavicular LAD. M/S: No cyanosis. No joint deformity. No clubbing. Neuro: Awake. Oriented only to place           ALEXUS Paulson.      Scheduled Meds:   lisinopril  2.5 mg Oral Daily    clopidogrel  75 mg Oral Daily    metoprolol succinate  12.5 mg Oral Daily    carbidopa-levodopa  1.5 tablet Oral TID    donepezil  10 mg Oral Nightly    pravastatin  40 mg Oral Daily    sodium chloride flush  10 mL Intravenous 2 times per day    aspirin  81 mg Oral Daily    insulin lispro  0-12 Units Subcutaneous TID WC    insulin lispro  0-6 Units Subcutaneous Nightly       Continuous Infusions:   dextrose         PRN Meds:  sodium chloride flush, acetaminophen **OR** acetaminophen, polyethylene glycol, glucose, dextrose, glucagon (rDNA), dextrose, prochlorperazine, perflutren lipid microspheres      Data:  CBC:   Recent Labs     04/26/20 2200 04/28/20  0717 04/29/20  0504   WBC 5.4 3.7* 3.7*   HGB 13.9 11.1* 10.5*   HCT 43.1 34.3* 31.4*   MCV 95.4 95.6 96.4    145 123*     BMP:   Recent Labs     04/27/20  0932 04/27/20  1458 04/28/20  0717 04/29/20  0504   * 150* 147* 142   K 4.4 4.3 4.0 3.9   * 117* 113* 109   CO2 25 25 25 24   PHOS 3.2  --  2.5 1.9*   BUN 99* 86* 58* 39*   CREATININE 1.2 1.2 0.9 0.8     LIVER PROFILE:   Recent Labs     04/26/20 2200   AST 38*   ALT 6*   BILITOT 0.3   ALKPHOS 78     PT/INR:   Recent Labs     04/26/20 2200   PROTIME 11.3   INR 0.97     CULTURES  None     RADIOLOGY    CT Head WO Contrast   Final Result   No acute intracranial abnormality. XR CHEST PORTABLE   Final Result   No acute process. TTE 4/26/2020  Summary   Technically difficult examination due to patient inability to lay flat. LV systolic function is severely reduced with a visually estimated EF of   25-30 %. EF estimated by Kim's method at 24 %. The left ventricle is normal in size with normal wall thickness.    Severe global hypokinesis with more prominent inferior HK on certain views. Elevated left atrial pressures with a septal E/e' ratio of 16.2. Right ventricular systolic function appears moderately reduced. Pacemaker/ICD wire visualized in right-sided chambers. Systolic pulmonary artery pressure (SPAP) is normal and estimated at 29mmHg   (right atrial pressure 8mmHg). Assessment/Plan:     MING w/ marked azotemia - Resolved  Dehydration - Resolved  - Cr 1.8,   - Etiology: likely prerenal  - Admitted to PCU, tele  - monitor renal function: 1.8 --> 1.2 --> 0.9 --> 0.8  - IVF --> changed to 0.45 NS at rate 75 cc/hr, stop IVF, held HCTZ  - nephro c/s     NSTEMI  CAD s/p PCI to RCA & OM1   Severe chronic systolic CHF  - follows with Dr. Quentin Payne and Janee Apple  - initial trop: 1.24, repeat 1.02, 0.92  - tele, serial troponins  - start ASA, full dose heparin gtt --> off, Pravastatin  - cardiology consulted  - check echo - EF 25-30%, severe global hypokinesis, medical management --> start BB, continue heparin gtt until noon tomorrow (4/29)  - start ACEi, started on DAPT - ASA and Plavix, off heparin gtt     Elevated aPTT - Resolved  - 155.9 --> 134.5 --> 56.8  - stopped infusion x 1 hours and then decreased dose & repeat aPTT  - pharmacy to manage     Hypernatremia - Resolved  - 145 --> 153 --> 147  - stop Normal saline, change to 0.45 NS --> stop IVF  - improving --> Na 142     Borderline Low BPs  Hx of HTN  - will likely have low BP 2/2 decreased cardiac function  - on IVF --> stop, held HCTZ and ACEi  - add Toprol 12.5 mg daily, start Lisinopril 2.5 mg  - will monitor for now     S/p Dual Chamber Pacemaker  - 2/2 hx of symptomatic bradycardia     DM Type II  - uncontrolled --> improving --> well controlled  - hold Metformin, low dose SSI  - POC Glucose, monitor     Parkinson's dementia   - family reports this is patient's baseline  - cont home Sinemet and donepezil     Syncope  - ~ 1 week ago  - no episodes since  - tele, fall precautions.       Generalized

## 2020-04-29 NOTE — PROGRESS NOTES
Report received from [place], [facility]. Pt awake in bed, expresses no needs at this time. Care transferred.

## 2020-04-29 NOTE — PROGRESS NOTES
[facility]       Progress Note    Sub/interval history  Feels better and has been eating lunch during my rounds      Last 24 h uop 1.2l    ROS: No chest pain/shortness of breath/fever/nausea/vomiting  PSFH: No visitor    Scheduled Meds:   lisinopril  2.5 mg Oral Daily    clopidogrel  75 mg Oral Daily    metoprolol succinate  12.5 mg Oral Daily    carbidopa-levodopa  1.5 tablet Oral TID    donepezil  10 mg Oral Nightly    pravastatin  40 mg Oral Daily    sodium chloride flush  10 mL Intravenous 2 times per day    aspirin  81 mg Oral Daily    insulin lispro  0-12 Units Subcutaneous TID WC    insulin lispro  0-6 Units Subcutaneous Nightly     Continuous Infusions:   dextrose       PRN Meds:.sodium chloride flush, acetaminophen **OR** acetaminophen, polyethylene glycol, glucose, dextrose, glucagon (rDNA), dextrose, prochlorperazine, perflutren lipid microspheres    Objective/     Vitals:    04/29/20 0600 04/29/20 0923 04/29/20 1200 04/29/20 1457   BP:  (!) 92/50 (!) 97/55 115/62   Pulse:  61  70   Resp:  18  16   Temp:  97 °F (36.1 °C)  97.4 °F (36.3 °C)   TempSrc:  Oral  Oral   SpO2:  99%  92%   Weight: 133 lb 8 oz (60.6 kg)      Height:         24HR INTAKE/OUTPUT:      Intake/Output Summary (Last 24 hours) at 4/29/2020 1507  Last data filed at 4/29/2020 1459  Gross per 24 hour   Intake 1863 ml   Output 1200 ml   Net 663 ml     Constitutional:  Alert, awake, no apparent distress  Cardiovascular:  S1, S2 without m/r/g  Respiratory:  CTA B without w/r/r  Abdomen: +bs, soft, nt  Ext: no LE edema    Data/  Recent Labs     04/26/20  2200 04/28/20  0717 04/29/20  0504   WBC 5.4 3.7* 3.7*   HGB 13.9 11.1* 10.5*   HCT 43.1 34.3* 31.4*   MCV 95.4 95.6 96.4    145 123*     Recent Labs     04/27/20  0932 04/27/20  1458 04/28/20  0717 04/29/20  0504   * 150* 147* 142   K 4.4 4.3 4.0 3.9   * 117* 113* 109   CO2 25 25 25 24   GLUCOSE 198* 77 111* 100*   PHOS 3.2  -- 2.5 1.9*   BUN 99* 86* 58* 39*   CREATININE 1.2 1.2 0.9 0.8   LABGLOM 58* 58* >60 >60   GFRAA >60 >60 >60 >60       Assessment/     -Acute kidney injury  - Etiology: Pre-renal in setting of reduced intake, hypotension, & ace-inhibitor use  - Data: SCr 1.8 with BUN of 127 on admission, previously 0.7 from Sep 2019  UA with no protein or blood, s.g. 1.025  - Clinical: Improving with IVF's, oliguric     Hypertension  - Running on lower side     Hypernatremia  - SNa 145 on admission, up to 153     Elevated troponins        Plan/     - stop IVF  - ok to use lisinopril/hctz & metformin        Kelly Alcazar MD  [facility]  Office: [phone]  Fax:    [phone]

## 2020-04-29 NOTE — PLAN OF CARE
Problem: Falls - Risk of:  Goal: Will remain free from falls  Description: Will remain free from falls  4/28/2020 2228 by Araseli Reza RN  Outcome: Ongoing  4/28/2020 1006 by Cleveland Tong RN  Outcome: Ongoing  Goal: Absence of physical injury  Description: Absence of physical injury  4/28/2020 2228 by Araseli Reza RN  Outcome: Ongoing  4/28/2020 1006 by Cleveland Tong RN  Outcome: Ongoing     Problem: Infection:  Goal: Will remain free from infection  Description: Will remain free from infection  4/28/2020 2228 by Araseli Reza RN  Outcome: Ongoing  4/28/2020 1006 by Cleveland Tong RN  Outcome: Ongoing     Problem: Safety:  Goal: Free from accidental physical injury  Description: Free from accidental physical injury  4/28/2020 2228 by Araseli Reza RN  Outcome: Ongoing  4/28/2020 1006 by Cleveland Tong RN  Outcome: Ongoing  Goal: Free from intentional harm  Description: Free from intentional harm  4/28/2020 2228 by Araseli Reza RN  Outcome: Ongoing  4/28/2020 1006 by Cleveland Tong RN  Outcome: Ongoing     Problem: Daily Care:  Goal: Daily care needs are met  Description: Daily care needs are met  4/28/2020 2228 by Araseli Reza RN  Outcome: Ongoing  4/28/2020 1006 by Cleveland Tong RN  Outcome: Ongoing     Problem: Pain:  Goal: Patient's pain/discomfort is manageable  Description: Patient's pain/discomfort is manageable  4/28/2020 2228 by Araseli Reza RN  Outcome: Ongoing  4/28/2020 1006 by Cleveland Tong RN  Outcome: Ongoing     Problem: Skin Integrity:  Goal: Skin integrity will stabilize  Description: Skin integrity will stabilize  4/28/2020 2228 by Araseli Reza RN  Outcome: Ongoing  4/28/2020 1006 by Cleveland Tong RN  Outcome: Ongoing     Problem: Discharge Planning:  Goal: Patients continuum of care needs are met  Description: Patients continuum of care needs are met  4/28/2020 2228 by Araseli Reza RN  Outcome: Ongoing  4/28/2020 1006 by Abdirashid Carcamo RN  Outcome: Ongoing     Problem: Nutrition  Goal: Optimal nutrition therapy  4/28/2020 2228 by Eder Riley RN  Outcome: Ongoing  4/28/2020 1420 by Renie Apley, RD, LD  Outcome: Not Met This Shift  Goal: Understanding of nutritional guidelines  4/28/2020 2228 by Eder Riley RN  Outcome: Ongoing  4/28/2020 1420 by Renie Apley, RD, GEN  Outcome: Not Met This Shift

## 2020-04-29 NOTE — PROGRESS NOTES
Patient awake and quiet in bed. Patient demented at baseline. Alert to self only, patient repetitive and forgetful, follows commands. Heparin gtt and IV fluids infusing. Patient on room air. No s/s of distress noted. Patient denies pain when assessed. Patient assisted to reposition onto opposite side. Acosta catheter intact and draining. Telesitter in place, bed alarm engaged. Call light within reach. Shift assessment completed. Will continue to monitor.

## 2020-04-30 NOTE — PROGRESS NOTES
[facility]       Progress Note    Sub/interval history  Feels better   Awaiting discharge    ROS: No chest pain/shortness of breath/fever/nausea/vomiting  PSFH: No visitor    Scheduled Meds:   lisinopril  2.5 mg Oral Daily    clopidogrel  75 mg Oral Daily    metoprolol succinate  12.5 mg Oral Daily    carbidopa-levodopa  1.5 tablet Oral TID    donepezil  10 mg Oral Nightly    pravastatin  40 mg Oral Daily    sodium chloride flush  10 mL Intravenous 2 times per day    aspirin  81 mg Oral Daily    insulin lispro  0-12 Units Subcutaneous TID WC    insulin lispro  0-6 Units Subcutaneous Nightly     Continuous Infusions:   dextrose       PRN Meds:.sodium chloride flush, acetaminophen **OR** acetaminophen, polyethylene glycol, glucose, dextrose, glucagon (rDNA), dextrose, prochlorperazine, perflutren lipid microspheres    Objective/     Vitals:    04/29/20 1457 04/29/20 1924 04/30/20 0140 04/30/20 0812   BP: 115/62 119/63 98/61 (!) 112/59   Pulse: 70 87 61 65   Resp: 16 16 16 18   Temp: 97.4 °F (36.3 °C) 97.1 °F (36.2 °C) 97.6 °F (36.4 °C) 97.3 °F (36.3 °C)   TempSrc: Oral Oral Oral Axillary   SpO2: 92% 92% 98% 99%   Weight:   133 lb 6.4 oz (60.5 kg)    Height:         24HR INTAKE/OUTPUT:      Intake/Output Summary (Last 24 hours) at 4/30/2020 1447  Last data filed at 4/30/2020 0844  Gross per 24 hour   Intake 420 ml   Output 600 ml   Net -180 ml     Constitutional:  Alert, awake, no apparent distress  Cardiovascular:  S1, S2 without m/r/g  Respiratory:  CTA B without w/r/r  Abdomen: +bs, soft, nt  Ext: no LE edema    Data/  Recent Labs     04/28/20  0717 04/29/20  0504   WBC 3.7* 3.7*   HGB 11.1* 10.5*   HCT 34.3* 31.4*   MCV 95.6 96.4    123*     Recent Labs     04/28/20  0717 04/29/20  0504 04/30/20  0434   * 142 143   K 4.0 3.9 3.9   * 109 111*   CO2 25 24 22   GLUCOSE 111* 100* 112*   PHOS 2.5 1.9* 1.5*   BUN 58* 39* 29*   CREATININE 0.9 0.8 0.7* LABGLOM >60 >60 >60   GFRAA >60 >60 >60       Assessment/     -Acute kidney injury  - Etiology: Pre-renal in setting of reduced intake, hypotension, & ace-inhibitor use  - Data: SCr 1.8 with BUN of 127 on admission, previously 0.7 from Sep 2019  UA with no protein or blood, s.g. 1.025  - Clinical: Improving with IVF's, oliguric     Hypertension  - Running on lower side     Hypernatremia  - SNa 145 on admission, up to 153     Elevated troponins        Plan/     - ok to use lisinopril/hctz & metformin    No renal follow-up needed as outpatient    Emily Natarajan MD  [facility]  Office: [phone]  Fax:    [phone]

## 2020-04-30 NOTE — PROGRESS NOTES
End of shift report given to [facility].  Pt in stable condition, care transferred at this time Electronically signed by General Chrystal RN on 4/30/2020 at 7:05 AM

## 2020-04-30 NOTE — PROGRESS NOTES
Shift assessment complete. Call light and bedside table within reach.  Electronically signed by Eleni Rebolledo RN on 4/29/2020 at 8:35 PM

## 2020-04-30 NOTE — PLAN OF CARE
Problem: Falls - Risk of:  Goal: Will remain free from falls  Description: Will remain free from falls  4/30/2020 0654 by Chris Montes De Oca RN  Outcome: Ongoing  4/29/2020 1727 by Rhiannon Moore RN  Outcome: Ongoing     Problem: Falls - Risk of:  Goal: Absence of physical injury  Description: Absence of physical injury  4/30/2020 0654 by Chris Montes De Oca RN  Outcome: Ongoing  4/29/2020 1727 by Rhiannon Moore RN  Outcome: Ongoing

## 2020-04-30 NOTE — PROGRESS NOTES
Spoke with the patients family Misty and Cherylene Lout and discussed medications on arriving home. They verbalized understanding and were able to repeat back the medication administration schedule over the phone. The patient is given all of his personal belongings and dressed back in his clothes. Family provided with the number to PCU and asked to call when they arrived and the patient would be transported to the car. The patient is confused and not able to sign his discharge paperwork. Misty and Cherylene Lout verbalized over the phone that they understood discharge instructions and consent to discharge with Jessica Hatch RN and [facility]. IV discontinued per policy and procedure. Catheter is intact. Bandage is applied.  Jenny Martínez

## 2020-04-30 NOTE — PROGRESS NOTES
Inpatient Physical Therapy Daily Treatment Note    Unit: PCU  Date:  2020  Patient Name:    Anderson Morel  Admitting diagnosis:  MING (acute kidney injury) [facility]) [N17.9]  Admit Date:  2020  Precautions/Restrictions:  fall risk, IV, bed/chair alarm, confusion, telemetry and telesitter      Discharge Recommendations: Home with 24/7 assist and home therapy  DME needs for discharge: RW       Therapy recommendation for EMS Transport: can transport by wheelchair    Therapy recommendations for staff:   Assist of 1 with use of rolling walker (RW) for all transfers and ambulation to/from bathroom    History of Present Illness: Per EMR: \"80 y. o. male with PMH below, presents with cyanosis, recent syncopal event, decreased PO intake, confusion, generalized weakness.  Pt reportedly had a syncopal event ~ 1 week ago. \"    Home Health S4 Level Recommendation: Level 1 Standard  AM-PAC Mobility Score   AM-PAC Inpatient Mobility Raw Score : 18     Treatment Time: 1045 - 1130  Treatment number: 3  Timed Code Treatment Minutes: 45 minutes  Total Treatment Minutes: 45 minutes    Cognition    A&O Person and Place   Able to follow 1 step commands   Unable to state the place, his prior living situation and information about family members. He was able to recall his . Patient kept asking for something to eat. He was informed that hsi lunch time is getting closer. Subjective  Patient lying supine in bed with no family present  Pt agreeable to this PT tx.      Pain   No  Location: N/A  Rating:  NA/10  Pain Medicine Status: No request made     Bed Mobility   Supine to Sit:   Not Tested  Sit to Supine:  Not Tested  Rolling:   Not Tested  Scooting:   Not Tested    Transfer Training     Sit to stand:   CGA  Stand to sit:   CGA  Bed to Chair:  CGA with use of rolling walker (RW)    Gait Training gait completed as indicated below  Distance:  40 ft + 10 ft  Deviations (firm surface/linoleum): N/A, decreased huy, Increased BRIANNE, decreased head and trunk rotation, forward flexed posture and decreased step length bilaterally   Assistive Device Used:  rolling walker (RW)  Level of Assist: CGA  Comment: Patient was limited in distance due to fatigue    Stair Training deferred, pt unsafe/not appropriate to complete stairs at this time  Pt ascended/descended N/A stairs with N/A with N/A, and use of N/A. Pattern: N/A    Therapeutic Exercise all completed bilaterally unless indicated 10 reps x 2 sets  Ankle pumps:  Hip abd/add against manual resistance  LAQ:   Marching:   Sit to stand transfers for only 5 reps using RW    Balance  Static Sitting:  Good - ; Supervision   Tolerance:   Dynamic Sitting:  Fair +; SBA   Tolerance:   Static Standing: Fair -; CGA   Tolerance: 5 min  Dynamic Standing: Fair -; CGA   Tolerance: 5 min    Patient Education      Role of PT, POC, Discharge recommendations, DC recommendations, safety awareness, transfer techniques, pursed lip breathing, energy conservation, pacing activity and calling for assist with mobility. Positioning Needs       Pt instructed and educated on use of call light to call for assist if desiring to get up or change position, call light handed to pt and needs within reach, Pt sitting up in chair, alarm set and call light provided and all needs within reach. ROM Measurements N/A  Knee Flexion:  Knee Extension:     Activity Tolerance   Pt completed therapy session with SOB noted with sit to stand and standing activities. SpO2: 89 to 93% with activity  HR: 88 bpm  BP: baseline 99/47, post walking 111/59. No complaints of dizziness or SOB. Assessment :  Patient tolerated functional transfers and ambulation better today than last time. Patient was more attentive to the given therapeutic tasks, urinated once using BSC and once using urinal during the treatment session.  Patient still could not aware about his PLOF, his family members and did not remember why he was in the hospital. Patient was feeling more active today to participate in the session. Patient will continue to benefit from skilled PT services to improve the physical function. Goals (all goals ongoing unless otherwise indicated)  To be met in 3 visits:  1). Independent with LE Ex x 10 reps     To be met in 6 visits:  1). Supine to/from sit: Modified Independent  2). Sit to/from stand: SBA  3). Bed to chair: SBA  4). Gait: Ambulate 50 ft.  with  SBA  and use of LRAD (least restrictive assistive device)  5). Tolerate B LE exercises 3 sets of 10-15 reps  6). Ascend/descend 2 steps with Min A with use of one hand rail and No AD. Plan   Continue with plan of care. Signature: Juan Jose Aragon MS PT, # [id number]      If patient discharges from this facility prior to next visit, this note will serve as the Discharge Summary.

## 2020-04-30 NOTE — DISCHARGE SUMMARY
Name:  Karrie Escobar  Room:  /2294-33  MRN:    [id number]    Discharge Summary      This discharge summary is in conjunction with a complete physical exam done on the day of discharge. Discharging Physician: Dr. Deepthi Barnett: 4/26/2020  Discharge: 4/30/2020     HPI taken from admission H&P:    The patient is a 80 y.o. male with PMH below, presents with cyanosis, recent syncopal event, decreased PO intake, confusion, generalized weakness. Pt reportedly had a syncopal event ~ 1 week ago. EMS responded but family/pt opted to not have him taken in for eval.  For the last week, the pt has reportedly not been eating very well; either fluids or solids. Family also reported to ER that pt has been increasingly weak and confused. Family also reportedly noticed that the pt's extremities were cyanotic today so they brought him to ER. No family at bedside when I saw pt. Pt is a very poor historian. He denies CP at this time.     Diagnoses this Admission and Hospital Course   MING w/ marked azotemia - Resolved  Dehydration - Resolved  - Cr 1.8,   - Etiology: likely prerenal  - Admitted to PCU, tele  - monitor renal function: 1.8 --> 1.2 --> 0.9 --> 0.8-->0.7 Stable after starting ACE-I  - IVF --> changed to 0.45 NS at rate 75 cc/hr, stop IVF, held HCTZ  - nephro c/s     NSTEMI  CAD s/p PCI to RCA & OM1   Severe chronic systolic CHF  - follows with Dr. Quentin Payne and Janee Apple  - initial trop: 1.24, repeat 1.02, 0.92  - tele, serial troponins  - start ASA, full dose heparin gtt --> off, Pravastatin  - cardiology consulted  - check echo - EF 25-30%, severe global hypokinesis, medical management --> start BB, continue heparin gtt until noon tomorrow (4/29)  - start ACEi, started on DAPT - ASA and Plavix, off heparin gtt-->D/c home on ACE and DAPT     Elevated aPTT - Resolved  - 155.9 --> 134.5 --> 56.8  - stopped infusion x 1 hours and then decreased dose  - pharmacy to manage     Hypernatremia - Resolved  - 145 --> 153 --> 147-->143  - stop Normal saline, change to 0.45 NS --> stop IVF  - improving --> Na 142     Borderline Low BPs  Hx of HTN  - will likely have low BP 2/2 decreased cardiac function  - on IVF --> stop, held HCTZ and ACEi  - add Toprol 12.5 mg daily, start Lisinopril 2.5 mg  - BP still low normal      S/p Dual Chamber Pacemaker  - 2/2 hx of symptomatic bradycardia     DM Type II  - uncontrolled --> improving --> well controlled  - hold Metformin, low dose SSI  - POC Glucose, monitor     Parkinson's dementia   - family reports this is patient's baseline  - cont home Sinemet and donepezil     Syncope  - ~ 1 week ago  - no episodes since  - tele, fall precautions.       Generalized weakness  - PT/OT eval.     Prolonged QTc  -  486 ms  -  avoid qt prolonging agents as able.       Moderate PCM  - Nutrition consult     Code Status  - Pt's daughter Bin Pollack wants clarification and further explanation regarding various code status options as pt's wife passed & there was uncertainty regarding her code status at her time of passing  - will place palliative care consult for code status discussion  - pt will remain full code at this time  - palliative care spoke with daughter (POA) who wishes for pt to remain full code     Procedures (Please Review Full Report for Details)  None     Consults    Cardiology   Nephrology     Physical Exam at Discharge:    BP (!) 112/59   Pulse 65   Temp 97.3 °F (36.3 °C) (Axillary)   Resp 18   Ht 6' (1.829 m)   Wt 133 lb 6.4 oz (60.5 kg)   SpO2 99%   BMI 18.09 kg/m²     Gen: No distress. Alert. Elderly  male, appears confused, laying on side, legs drawn up  Eyes:  No sclera icterus. No conjunctival injection. Neck: Trachea midline. Resp: No accessory muscle use. No crackles. No wheezes. No rhonchi. On RA  CV: Regular rate. Regular rhythm. No murmur. No rub. No edema. GI: Soft, Non-tender. Non-distended.   Normal bowel sounds. Skin: Warm and dry. No rash on exposed extremities. Neuro: Awake. Grossly nonfocal    Psych: Oriented to self only, able to recall month but not year or president    CBC:   Recent Labs     04/28/20  0717 04/29/20  0504   WBC 3.7* 3.7*   HGB 11.1* 10.5*   HCT 34.3* 31.4*   MCV 95.6 96.4    123*     BMP:   Recent Labs     04/28/20  0717 04/29/20  0504 04/30/20  0434   * 142 143   K 4.0 3.9 3.9   * 109 111*   CO2 25 24 22   PHOS 2.5 1.9* 1.5*   BUN 58* 39* 29*   CREATININE 0.9 0.8 0.7*     APTT:   Recent Labs     04/28/20  0717 04/28/20  1352 04/29/20  0504   APTT 56.8* 59.6* 60.9*     CULTURES  None     RADIOLOGY  CT Head WO Contrast   Final Result   No acute intracranial abnormality. XR CHEST PORTABLE   Final Result   No acute process. Echo: 4/27/20   Summary   Technically difficult examination due to patient inability to lay flat. LV systolic function is severely reduced with a visually estimated EF of   25-30 %. EF estimated by Kim's method at 24 %. The left ventricle is normal in size with normal wall thickness. Severe global hypokinesis with more prominent inferior HK on certain views. Elevated left atrial pressures with a septal E/e' ratio of 16.2. Right ventricular systolic function appears moderately reduced. Pacemaker/ICD wire visualized in right-sided chambers. Systolic pulmonary artery pressure (SPAP) is normal and estimated at 29mmHg    Discharge Medications     Medication List      START taking these medications    aspirin 81 MG chewable tablet  Take 1 tablet by mouth daily  Start taking on: May 1, 2020  Notes to patient:  Aspirin  Use: prevents heart attacks and strokes. Side effects: bleeding/bruising more easily, belly upset. clopidogrel 75 MG tablet  Commonly known as:  PLAVIX  Take 1 tablet by mouth daily  Start taking on:   May 1, 2020     lisinopril 2.5 MG tablet  Commonly known as:  PRINIVIL;ZESTRIL  Take 1 tablet by mouth daily  Start taking on: May 1, 2020     metoprolol succinate 25 MG extended release tablet  Commonly known as:  TOPROL XL  Take 0.5 tablets by mouth daily  Start taking on: May 1, 2020  Notes to patient:  Beta Blockers   Use: treat heart failure, prevent future heart attacks, lower blood pressure and heart rate, treat chest pain  Side effects: Dizziness, fatigue and diarrhea        CONTINUE taking these medications    carbidopa-levodopa  MG per tablet  Commonly known as:  SINEMET     donepezil 10 MG tablet  Commonly known as:  ARICEPT  TAKE 1 TABLET BY MOUTH NIGHTLY FOR MEMORY     metFORMIN 500 MG tablet  Commonly known as:  GLUCOPHAGE  Take 1 tablet by mouth 2 times daily (with meals) Increase in 4 weeks to 2 pills twice daily (with meals)     pravastatin 40 MG tablet  Commonly known as:  PRAVACHOL  TAKE 1 TABLET BY MOUTH ONCE DAILY     tamsulosin 0.4 MG capsule  Commonly known as:  FLOMAX  Take 1 capsule by mouth once daily     therapeutic multivitamin-minerals tablet        STOP taking these medications    blood glucose monitor kit and supplies     blood glucose test strips     blood glucose test strips strip  Commonly known as:  ASCENSIA AUTODISC VI;ONE TOUCH ULTRA TEST VI     Handicap Placard Misc     lisinopril-hydroCHLOROthiazide 20-12.5 MG per tablet  Commonly known as:  PRINZIDE;ZESTORETIC     ONE TOUCH LANCETS Misc           Where to Get Your Medications      These medications were sent to [address] [place] [address]  [facility]    Phone:  [phone]   · aspirin 81 MG chewable tablet  · clopidogrel 75 MG tablet  · lisinopril 2.5 MG tablet  · metoprolol succinate 25 MG extended release tablet       Discharged in stable condition to home with 24/7 assist     Follow Up: Follow up with PCP, cardiology OP     Total time spent on discharge is 35 minutes    ALEXUS Paulson.

## 2020-05-01 NOTE — TELEPHONE ENCOUNTER
[facility] called per [employer], they were confused as to why if Mathew Sheffield is to continue his metformin but he shouldn't check his blood sugars. The physician in the hospital told them it was not necessary. What are your thoughts?

## 2020-05-08 NOTE — TELEPHONE ENCOUNTER
Saw issues with [facility] not being able to assess patient at home. No answer at door. Please call daughter or son-in law and find out what is going on and if they can call [facility] and set up appts.

## 2020-05-13 NOTE — CARE COORDINATION
Madeline [facility] Transitions Follow Up Call    2020    Patient: Rob Tinoco  Patient : 1935   MRN: [id number]  Reason for Admission: MING, syncope  Discharge Date: 20 RARS: Readmission Risk Score: 23      Spoke with: son-in-law    Spoke with DAYRON who confirms that Mr Salomón Grubbs is still staying with them at their home a few blocks from his own home[facility] He will transition to his home at some point. They continue to encourage fluid intake. Has not called PCP as instructed to schedule TCM visit and ask about labwork as renal panel may be indicated. DAYRON states he forgot and will contact PCP immediately after call to schedule. He denies needs/concerns. Aware that they can call [facility]  for prn visit or recommendation. CTN following. Care Transitions Subsequent and Final Call    Subsequent and Final Calls  Do you have any ongoing symptoms?:  No  Have your medications changed?:  No  Do you have any questions related to your medications?:  No  Do you currently have any active services?:  Yes  Are you currently active with any services?:  Home Health  Do you have any needs or concerns that I can assist you with?:  No  Identified Barriers:  Impairment  Care Transitions Interventions  No Identified Needs  Other Interventions:             Follow Up  Future Appointments   Date Time Provider Nicole Rosado   2020 10:15 AM TEMITOPE Perkins CNP AND LEXX Sanchez RN

## 2020-05-13 NOTE — TELEPHONE ENCOUNTER
Great question. I can place order for BMP but he also needs to schedule ED f/u visit. Are we watching STEPHANIA so we can get credit for [facility] visits when we do them?

## 2020-05-15 NOTE — TELEPHONE ENCOUNTER
Spoke with Evon Jarrell and discussed the [facility] notes stating that there was no answer. He said there is confusion regarding which house to see him at. He is going to call [facility] and go over the visits with the.     He is also going to take him for BMP and schedule the ed follow up.

## 2020-05-15 NOTE — TELEPHONE ENCOUNTER
Spoke with Yessenia Chinchilla. He will take him to do Labs. Will also call when he gets home to schedule the follow up. Madeline 45 Transitions Initial Follow Up Call    Outreach made within 2 business days of discharge: Yes    Patient: Caitlin Sharp Patient : 1935   MRN: [id number]  Reason for Admission: There are no discharge diagnoses documented for the most recent discharge. Discharge Date: 20       Spoke with: son / Mary Lowery. Discharge department/facility: [facility] Patient Contact:  Was patient able to fill all prescriptions: Yes  Was patient instructed to bring all medications to the follow-up visit: Yes  Is patient taking all medications as directed in the discharge summary?  Yes  Does patient understand their discharge instructions: Yes  Does patient have questions or concerns that need addressed prior to 7-14 day follow up office visit: no    Scheduled appointment with PCP within 7-14 days    Follow Up  Future Appointments   Date Time Provider Nicole Rosado   2020 10:15 AM TEMITOPE James CNP AND [facility]       [place]

## 2020-05-27 NOTE — TELEPHONE ENCOUNTER
Patient needs refills on the following medications-clopidogrel (PLAVIX) 75 MG tablet, lisinopril (PRINIVIL;ZESTRIL) 2.5 MG tablet, carbidopa-levodopa (SINEMET)  MG per tablet    [facility] to [facility]-[phone]

## 2020-05-27 NOTE — TELEPHONE ENCOUNTER
Refill request for lisinopril, plavix, sinemet medication.      Name of [facility]    Last visit - 4/20/20     Pending visit - 6/26/20    Last refill -5/1/20    Medication Contract signed -   Last Oarrs ran-     Additional Comments

## 2020-05-27 NOTE — TELEPHONE ENCOUNTER
I do not prescribe the sinemet, I think they need to contact neurology, let me know if they are unable to do so

## 2020-06-26 PROBLEM — G20.A1 PARKINSON'S DISEASE: Status: ACTIVE | Noted: 2020-01-01

## 2020-06-26 PROBLEM — M81.0 OSTEOPOROSIS: Status: ACTIVE | Noted: 2020-01-01

## 2020-06-26 PROBLEM — R93.89 ABNORMAL RADIOGRAPHIC EXAMINATION: Status: ACTIVE | Noted: 2020-01-01

## 2020-06-26 PROBLEM — Z95.0 HISTORY OF CARDIAC PACEMAKER IN SITU: Status: ACTIVE | Noted: 2020-01-01

## 2020-06-26 PROBLEM — G20 PARKINSON'S DISEASE (HCC): Status: ACTIVE | Noted: 2020-01-01

## 2020-06-26 NOTE — PROGRESS NOTES
Medicare Annual Wellness Visit  Name: Carolyne Duarte Date: 2020   MRN: [id number] Sex: Male   Age: 80 y.o. Ethnicity: Non-/Non    : 1935 Race: Evan Ordaz is here for Medicare AWV (pt instructed per provider to have list of medications, pen and paper for note taking, pt abeba the clock for the cognitive part of the screening.)    Screenings for behavioral, psychosocial and functional/safety risks, and cognitive dysfunction are all negative except as indicated below. These results, as well as other patient data from the [employer] form, are documented in Flowsheets linked to this Encounter. C/o pain around buttocks. He does not have [address] any longer. Son-in-law and daughter do not feel comfortable looking at the area. Memory loss continues to worsen. Try to get him outside as much as possible. He is not \"doing anything\" per son-in-law. He showers back at his own home. A lady comes twice a week to get him showered and transport outside. Hx hyponatremia. Allergies   Allergen Reactions    Pcn [Penicillins]      rash         Prior to Visit Medications    Medication Sig Taking?  Authorizing Provider   donepezil (ARICEPT) 10 MG tablet TAKE 1 TABLET BY MOUTH NIGHTLY FOR  MEMORY Yes TEMITOPE Green CNP   lisinopril (PRINIVIL;ZESTRIL) 2.5 MG tablet Take 1 tablet by mouth daily Yes TEMITOPE Becker CNP   clopidogrel (PLAVIX) 75 MG tablet Take 1 tablet by mouth daily Yes TEMITOPE Becker CNP   aspirin 81 MG chewable tablet Take 1 tablet by mouth daily Yes JD Jamison   metoprolol succinate (TOPROL XL) 25 MG extended release tablet Take 0.5 tablets by mouth daily Yes JD Jamison   tamsulosin (FLOMAX) 0.4 MG capsule Take 1 capsule by mouth once daily Yes TEMITOPE Green CNP   pravastatin (PRAVACHOL) 40 MG tablet TAKE 1 TABLET BY MOUTH ONCE DAILY Yes TEMITOPE Green CNP   metFORMIN (GLUCOPHAGE) 500 MG tablet Take 1 Drawing Test (CDT) Score: Normal  Total Score Interpretation: Positive Mini-Cog  Cognitive Impairment Interventions:  · Monitor. Enc to reach out to [facility] for help    Substance Abuse:  Social History     Tobacco History     Smoking Status  Current Every Day Smoker Last attempt to quit  1/1/1956 Smoking Frequency  0.5 packs/day Smoking Tobacco Type  Cigarettes, Pipe, Cigars    Smokeless Tobacco Use  Never Used          Alcohol History     Alcohol Use Status  Yes Comment  1 GLASS WINE  A DAY          Drug Use     Drug Use Status  No          Sexual Activity     Sexually Active  Not Asked               Audit Questionnaire: Screen for Alcohol Misuse  How often do you have a drink containing alcohol?: Never  Substance Abuse Interventions:  · N/A    General Health:  General  In general, how would you say your health is?: Fair  In the past 7 days, have you experienced any of the following? New or Increased Pain, New or Increased Fatigue, Loneliness, Social Isolation, Stress or Anger?: None of These  Do you get the social and emotional support that you need?: Yes  Do you have a Living Will?: (!) No  General Health Risk Interventions:  · No Living Will: provided the state-specific advance directive document to the patient    Health Habits/Nutrition:  Health Habits/Nutrition  Do you exercise for at least 20 minutes 2-3 times per week?: Yes  Have you lost any weight without trying in the past 3 months?: No  Do you eat fewer than 2 meals per day?: No  Have you seen a dentist within the past year?: (!) No  There is no height or weight on file to calculate BMI.   Health Habits/Nutrition Interventions:  · Dental exam overdue:  patient declines dental evaluation    Hearing/Vision:  No exam data present  Hearing/Vision  Do you or your family notice any trouble with your hearing?: (!) Yes  Do you have difficulty driving, watching TV, or doing any of your daily activities because of your eyesight?: No  Have you had an eye exam within the past year?: (!) No  Hearing/Vision Interventions:  · Hearing concerns:  patient declines any further evaluation/treatment for hearing issues  · Vision concerns:  patient declines any further evaluation/treatment for this issue    Safety:  Safety  Do you have working smoke detectors?: Yes  Have all throw rugs been removed or fastened?: (!) No  Do you have non-slip mats or surfaces in all bathtubs/showers?: Yes  Do all of your stairways have a railing or banister?: Yes  Are your doorways, halls and stairs free of clutter?: Yes  Do you always fasten your seatbelt when you are in a car?: Yes  Safety Interventions:  · Home safety tips provided    ADL:  ADLs  In the past 7 days, did you need help from others to perform any of the following everyday activities? Eating, dressing, grooming, bathing, toileting, or walking/balance?: (!) Dressing, Grooming  In the past 7 days, did you need help from others to take care of any of the following?  Laundry, housekeeping, banking/finances, shopping, telephone use, food preparation, transportation, or taking medications?: (!) Laundry, Housekeeping, Banking/Finances, Shopping, Telephone Use, Food Preparation, Transportation, Taking Medications  ADL Interventions:  · Referred to [employer] on Aging    Personalized Preventive Plan   Current Health Maintenance Status  Immunization History   Administered Date(s) Administered    Influenza 09/11/2013    Influenza Virus Vaccine 09/24/2003, 09/27/2012    Influenza Whole 12/01/2009    Influenza, High Dose (Fluzone 65 yrs and older) 10/07/2014, 10/23/2015, 12/09/2016, 12/19/2017, 11/09/2018    Influenza, Triv, inactivated, subunit, adjuvanted, IM (Fluad 65 yrs and older) 11/04/2019    Pneumococcal Conjugate 13-valent ([id number]) 04/14/2015    Pneumococcal Polysaccharide ([id number]) 01/01/2005, 08/01/2012    Td (Adult), 5 Lf Tetanus Toxoid, Pf (Tenivac, Decavac) 06/01/2006    Td, unspecified formulation 06/01/2006    Zoster Live (Zostavax) 07/01/2010        Health Maintenance   Topic Date Due    DTaP/Tdap/Td vaccine (1 - Tdap) 04/15/1954    Shingles Vaccine (2 of 3) 08/26/2010    Annual Wellness Visit (AWV)  05/29/2019    Lipid screen  09/04/2020    Potassium monitoring  05/29/2021    Creatinine monitoring  05/29/2021    Flu vaccine  Completed    Pneumococcal 65+ years Vaccine  Completed    Hepatitis A vaccine  Aged Out    Hib vaccine  Aged Out    Meningococcal (ACWY) vaccine  Aged Out     Recommendations for uMentioned Due: see orders and patient instructions/AVS.  . Recommended screening schedule for the next 5-10 years is provided to the patient in written form: see Patient Vee Acosta was seen today for medicare awv. Diagnoses and all orders for this visit:    Routine general medical examination at a health care facility    Cognitive impairment    Hyponatremia  -     Comprehensive Metabolic Panel; Future    Pain, coccyx    Reviewed cognitive impairment. Enc to continue to work on good routine and stimulation. Enc to check [facility] and [facility] website for more information and resources. Set up [facility] to review pain in coccyx and hyponatremia labs. Imtiaz Boyd is a 80 y.o. male being evaluated by a Virtual Visit (video and audio) encounter to address concerns as mentioned above. A caregiver was present when appropriate. Due to this being a TeleHealth encounter (During [facility] public health emergency), evaluation of the following organ systems was limited: Vitals/Constitutional/EENT/Resp/CV/GI//MS/Neuro/Skin/Heme-Lymph-Imm.   Pursuant to the emergency declaration under the [address] [place] [address], [address] [place] [address] waiver authority and the [employer] and [employer] Act, this Virtual Visit was conducted with patient's (and/or legal guardian's) consent, to reduce the patient's risk of exposure to COVID-19 and provide necessary

## 2020-07-14 NOTE — TELEPHONE ENCOUNTER
CLINICAL PHARMACY: ADHERENCE REVIEW  Identified care gap per Barbie; fills at [facility]: ACE/ARB, Diabetes and Statin adherence    Last Office Visit: 6/26/20 virtual    ASSESSMENT  ACE/ARB ADHERENCE  PDC: 100%    Per [address] [place] [address]   Lisinopril 2.5mg last filled on 6/13/20 for a 30 day supply; 3RF    BP Readings from Last 3 Encounters:   04/30/20 (!) 112/59   04/20/20 (!) 145/90   02/05/20 (!) 144/86     Estimated Creatinine Clearance: 77 mL/min (A) (based on SCr of 0.6 mg/dL (L)). DIABETES ADHERENCE  PDC: 90%    Per [facility]   Metformin 500mg Last 3/16 due 6/14      Lab Results   Component Value Date    LABA1C 8.5 04/27/2020    LABA1C 9.7 02/05/2020    LABA1C 7.9 11/04/2019       STATIN ADHERENCE    Per [facility]   Pravastatin 40mg last filled on 6/13/20 for a 30 day supply; 3 refills remaining. Filled 3/16 due 6/14    Lab Results   Component Value Date    CHOL 161 09/04/2019    TRIG 99 09/04/2019    HDL 72 (H) 09/04/2019    LDLCALC 69 09/04/2019     ALT   Date Value Ref Range Status   04/26/2020 6 (L) 10 - 40 U/L Final     AST   Date Value Ref Range Status   04/26/2020 38 (H) 15 - 37 U/L Final     The ASCVD Risk score (Akanksha Pelletier., et al., 2013) failed to calculate for the following reasons: The 2013 ASCVD risk score is only valid for ages 36 to 78    The patient has a prior MI or stroke diagnosis     PLAN  Attempted to reach patient regarding refills due. No answer at home, mailbox full  Called DTR no answer, no TAD    2nd outreach for refill reminder. No answer for both patient and EC. Will send letter to home.

## 2020-07-17 NOTE — TELEPHONE ENCOUNTER
CLINICAL PHARMACY CONSULT: MED RECONCILIATION/REVIEW ADDENDUM    For Pharmacy Admin Tracking Only    PHSO: Yes  Total # of Interventions Recommended: 3  - New Order #: 0 New Medication Order Reason(s): Adherence  - Maintenance Safety Lab Monitoring #: 1  - New Therapy Lab Monitoring #: 0  Recommended intervention potential cost savings: 0  Total Interventions Accepted: 0  Time Spent (min): 15    Love Booth CPhT.   [address]

## 2020-07-27 NOTE — TELEPHONE ENCOUNTER
Refill request for metformin medication.      Name of Pharmacy- [facility]    Last visit - 6-26-20     Pending visit - 9-28-20    Last refill -3-16-20    Medication Contract signed -   Last Alisha Mcclelland ran-     Additional Comments

## 2020-09-22 NOTE — TELEPHONE ENCOUNTER
Refill request for pravastatin  medication.      Name of Pharmacy- [facility]      Last visit - 6-     Pending visit - 9-    Last refill -3-      Medication Contract signed -   Bradley lama-         Additional Comments

## 2020-09-28 NOTE — TELEPHONE ENCOUNTER
Call Dr Saloni Domingo office. Saw patient today (4 weeks after Corl). Pt has gained 8 lbs and family members have noticed increase SOB with walking to restroom and even w sitting. No pitting edema BLE. Osat 93% at rest.   Not sure if they want me to change anything on my end or if Dr Sher Brock will handle.

## 2020-09-28 NOTE — PATIENT INSTRUCTIONS
Check fasting blood sugar several times a week. Perform blood work today. Home Health Care will get set up. I have reached out to Dr Theo Hardwick office and we will let you know when we hear back from then.

## 2020-09-28 NOTE — PROGRESS NOTES
Problem Relation Age of Onset    Cancer Mother     Diabetes Mother     Alzheimer's Disease Father      Social History     Socioeconomic History    Marital status:      Spouse name: Not on file    Number of children: Not on file    Years of education: Not on file    Highest education level: Not on file   Occupational History    Not on file   Social Needs    Financial resource strain: Not on file    Food insecurity     Worry: Not on file     Inability: Not on file    Transportation needs     Medical: Not on file     Non-medical: Not on file   Tobacco Use    Smoking status: Current Every Day Smoker     Packs/day: 0.50     Types: Cigarettes, Pipe, Cigars     Last attempt to quit: 1956     Years since quittin.7    Smokeless tobacco: Never Used   Substance and Sexual Activity    Alcohol use: Yes     Comment: 1 GLASS WINE  A DAY    Drug use: No    Sexual activity: Not on file   Lifestyle    Physical activity     Days per week: Not on file     Minutes per session: Not on file    Stress: Not on file   Relationships    Social connections     Talks on phone: Not on file     Gets together: Not on file     Attends Mandaen service: Not on file     Active member of club or organization: Not on file     Attends meetings of clubs or organizations: Not on file     Relationship status: Not on file    Intimate partner violence     Fear of current or ex partner: Not on file     Emotionally abused: Not on file     Physically abused: Not on file     Forced sexual activity: Not on file   Other Topics Concern    Not on file   Social History Narrative    Not on file       Review of Systems   Constitutional: Negative for appetite change, chills, fatigue, fever and unexpected weight change. HENT: Negative for congestion, ear discharge, ear pain, facial swelling, hearing loss, sinus pressure, sneezing and sore throat. Respiratory: Positive for shortness of breath. Negative for cough. Cardiovascular: Negative for chest pain. Gastrointestinal: Negative for diarrhea, nausea and vomiting. Genitourinary: Negative for difficulty urinating, dysuria, hematuria and urgency. Musculoskeletal: Negative for arthralgias and gait problem. Neurological: Positive for tremors and weakness. Negative for dizziness and headaches. Hematological: Negative for adenopathy. Psychiatric/Behavioral: Negative for sleep disturbance and suicidal ideas. Physical Exam  Constitutional:       Appearance: He is normal weight. HENT:      Head: Normocephalic. Cardiovascular:      Rate and Rhythm: Normal rate and regular rhythm. Pulses: Normal pulses. Heart sounds: Normal heart sounds. Pulmonary:      Effort: Pulmonary effort is normal.      Breath sounds: Normal breath sounds. Musculoskeletal:      Comments: Kyphosis of spine   Skin:     Capillary Refill: Capillary refill takes less than 2 seconds. Neurological:      General: No focal deficit present. Mental Status: He is alert. Psychiatric:         Mood and Affect: Mood normal.      Comments: Good eye contact. Poor historian with dementia         Assessment:     1. Shortness of breath  Check labs. F/u with Dr Luis Nava on patient's newer symptoms.     - Comprehensive Metabolic Panel; Future  - Brain Natriuretic Peptide; Future    2. Type 2 diabetes mellitus without complication, without long-term current use of insulin (HCC)  Enc to check BS at home. Check A1c today. Reviewed small, frequent meals/snacks. - Lipid Panel; Future  - Hemoglobin A1C; Future    3. Mixed hyperlipidemia  Monitor    4. Essential hypertension  Stable, monitor    5. Cognitive impairment  Monitor closely    6. Parkinson disease ([facility] [address]  Monitor    7. Need for influenza vaccination  Pt tolerated well    - INFLUENZA, QUADV, ADJUVANTED, 65 YRS =, IM, PF, PREFILL SYR, 0.5ML (FLUAD)      Plan:    See above plan.      Return in about 3 months (around 12/28/2020) for 30 min appt, DM, A1C, SOB.     Edythe Given, APRN - CNP

## 2020-09-30 NOTE — TELEPHONE ENCOUNTER
I spoke with patient son in law this morning and forwarded you the message attached to the result note did you receive it?

## 2020-09-30 NOTE — TELEPHONE ENCOUNTER
Can you follow up on this to find out what Dr Zenaida Enriquez office recommendation is/was to patient? I just received BNP 3,000+ result and want to make sure Dr Jackson Landrum knows this finding.

## 2020-10-12 NOTE — TELEPHONE ENCOUNTER
Orders were sent for home health PT but they have not heard anything from them. Can you tell patient's son in law  who to call to get on their schedule.     [phone]

## 2020-10-26 NOTE — TELEPHONE ENCOUNTER
Son calling and [employer] has not received orders for patient to start physical therapy yet.   Please re-send

## 2020-10-26 NOTE — TELEPHONE ENCOUNTER
They received an order for PT that was dates for 9/28 and the orders are only good for 5 days. Can they get a new order along with a demographic sheet and insurance. Also include the last office note.  Please fax this to [phone]

## 2020-10-29 NOTE — TELEPHONE ENCOUNTER
Corin Jean from [facility] called, she said the patient does not have a diagnosis code on the referral that would qualify him for [facility]. She said that he is also not homebound according to their survey they do on each patient. She was telling the family that they would be better of doing outpatient physical therapy. She also said Medicare does not recognize the concern for covid as a reason for [facility] services either at this time. She said if you have any questions you can call her at [phone].

## 2020-10-30 NOTE — TELEPHONE ENCOUNTER
He should qualify for [facility]. Takes taxing effort to leave the home even with assistance.      BLE weakness and risk of falls should work for PT.

## 2020-11-03 NOTE — PROGRESS NOTES
Graciela Petersen received a viral test for COVID-19. They were educated on isolation and quarantine as appropriate. For any symptoms, they were directed to seek care from their PCP, given contact information to establish with a doctor, directed to an urgent care or the emergency room.

## 2020-11-03 NOTE — TELEPHONE ENCOUNTER
Spoke with son/mitzi . He scheduled an appointment for Allean Epley at the covid clinic. Patient is 80years old complaining of sinus/chest congestion for 1-2 days. NEG for sore throat or ear pain. NEG for fever. NEG for wheezing. Patient HAS difficulty in breathing. Noticing some SOB with Exertion     Patient DENIES history of COPD or Asthma. Please advise. Son states that several other people in the household have been sick with viral type sx. Granddaughter was NON-DETECTED Covid.

## 2020-11-03 NOTE — TELEPHONE ENCOUNTER
[facility] received a call from:    Name of Caller: Peggy Parker (pronounced \"Touch\")    Relationship to patient: Son in law    Organization name: n/a    Best contact number: [phone]    Reason for call:     Zoran Stockton called in while in line for COVID test for his father in law. He was under the impression that someone would listen to his father in law's lungs and prescribe him an antibiotic during his COVID test.  He would like clarification on this ASAP. Please advise.

## 2020-11-03 NOTE — PATIENT INSTRUCTIONS

## 2020-11-03 NOTE — TELEPHONE ENCOUNTER
Spoke with son-in-law. Upset that no one will listen to his father-in-laws lungs. Will prescribe zpak.  Son-in-law is in agreement

## 2020-11-05 NOTE — RESULT ENCOUNTER NOTE
Your Covid-19 test resulted not detected/negative. What happens if I have a negative test?  Remember to wash your hands often, avoid touching your face, stay 6 feet from people you do not live with, and wear a cloth facemask when you go out in public. A negative COVID-19 test at one point in time does not mean you will stay negative. You could become ill with COVID-19 and/or test positive at any time. If you are a close contact of a confirmed or suspected case, continue to stay home and away from others until 14 days after your last exposure. If you do not have symptoms and were not in close contact with a confirmed or suspected case, you can stop isolating. If you currently have symptoms of COVID-19 and were not in close contact with a confirmed or suspected case, you should keep monitoring symptoms and talk to your doctor or other healthcare provider about staying home and if you need to get tested again. If you develop symptoms of COVID-19, stay at home and away from others and talk to your doctor or other healthcare provider about getting tested again. For additional information, visit coronavirus. [place].gov. For answers to your COVID-19 questions, call 1-947-5-ASK-[facility] ([phone]).

## 2020-11-06 NOTE — TELEPHONE ENCOUNTER
[phone] (home)   [facility] , just checking on [place] per [facility].

## 2020-11-30 NOTE — TELEPHONE ENCOUNTER
Refill request for cloppidogrel / lisinopril  medication.      Name of Pharmacy- [facility]       Last visit - 9-     Pending visit - none     Last refill -5-      Medication Contract signed -   Bradley lama-         Additional Comments

## 2021-01-01 ENCOUNTER — TELEPHONE (OUTPATIENT)
Dept: INTERNAL MEDICINE CLINIC | Age: 86
End: 2021-01-01

## 2021-01-01 ENCOUNTER — HOSPITAL ENCOUNTER (OUTPATIENT)
Dept: GENERAL RADIOLOGY | Age: 86
Discharge: HOME | End: 2021-04-01
Payer: MEDICARE

## 2021-01-01 ENCOUNTER — VIRTUAL VISIT (OUTPATIENT)
Dept: PULMONOLOGY | Age: 86
End: 2021-01-01
Payer: MEDICARE

## 2021-01-01 ENCOUNTER — APPOINTMENT (OUTPATIENT)
Dept: CT IMAGING | Age: 86
DRG: 947 | End: 2021-01-01
Payer: MEDICARE

## 2021-01-01 ENCOUNTER — OFFICE VISIT (OUTPATIENT)
Dept: PULMONOLOGY | Age: 86
End: 2021-01-01
Payer: MEDICARE

## 2021-01-01 ENCOUNTER — HOSPITAL ENCOUNTER (OUTPATIENT)
Dept: PULMONOLOGY | Age: 86
Discharge: HOME | End: 2021-04-01
Payer: MEDICARE

## 2021-01-01 ENCOUNTER — HOSPITAL ENCOUNTER (EMERGENCY)
Age: 86
Discharge: ANOTHER HOSPITAL | End: 2021-03-05
Attending: STUDENT IN AN ORGANIZED HEALTH CARE EDUCATION/TRAINING PROGRAM
Payer: MEDICARE

## 2021-01-01 ENCOUNTER — TELEPHONE (OUTPATIENT)
Dept: PULMONOLOGY | Age: 86
End: 2021-01-01

## 2021-01-01 ENCOUNTER — VIRTUAL VISIT (OUTPATIENT)
Dept: INTERNAL MEDICINE CLINIC | Age: 86
End: 2021-01-01
Payer: MEDICARE

## 2021-01-01 ENCOUNTER — APPOINTMENT (OUTPATIENT)
Dept: GENERAL RADIOLOGY | Age: 86
End: 2021-01-01
Payer: MEDICARE

## 2021-01-01 ENCOUNTER — APPOINTMENT (OUTPATIENT)
Dept: GENERAL RADIOLOGY | Age: 86
DRG: 947 | End: 2021-01-01
Payer: MEDICARE

## 2021-01-01 ENCOUNTER — CARE COORDINATION (OUTPATIENT)
Dept: CASE MANAGEMENT | Age: 86
End: 2021-01-01

## 2021-01-01 ENCOUNTER — IMMUNIZATION (OUTPATIENT)
Dept: PRIMARY CARE CLINIC | Age: 86
End: 2021-01-01
Payer: MEDICARE

## 2021-01-01 ENCOUNTER — HOSPITAL ENCOUNTER (INPATIENT)
Age: 86
LOS: 1 days | DRG: 947 | End: 2021-04-16
Attending: EMERGENCY MEDICINE | Admitting: INTERNAL MEDICINE
Payer: MEDICARE

## 2021-01-01 ENCOUNTER — HOSPITAL ENCOUNTER (INPATIENT)
Age: 86
LOS: 2 days | Discharge: HOME HEALTH | DRG: 281 | End: 2021-03-07
Attending: HOSPITALIST | Admitting: HOSPITALIST
Payer: MEDICARE

## 2021-01-01 VITALS
SYSTOLIC BLOOD PRESSURE: 62 MMHG | WEIGHT: 140.43 LBS | HEIGHT: 73 IN | BODY MASS INDEX: 18.61 KG/M2 | RESPIRATION RATE: 16 BRPM | TEMPERATURE: 97.4 F | OXYGEN SATURATION: 98 % | DIASTOLIC BLOOD PRESSURE: 36 MMHG | HEART RATE: 96 BPM

## 2021-01-01 VITALS
OXYGEN SATURATION: 98 % | HEIGHT: 72 IN | HEART RATE: 75 BPM | RESPIRATION RATE: 25 BRPM | BODY MASS INDEX: 22.35 KG/M2 | WEIGHT: 165 LBS | DIASTOLIC BLOOD PRESSURE: 64 MMHG | SYSTOLIC BLOOD PRESSURE: 104 MMHG | TEMPERATURE: 97.5 F

## 2021-01-01 VITALS
TEMPERATURE: 95.3 F | BODY MASS INDEX: 18.21 KG/M2 | HEART RATE: 85 BPM | OXYGEN SATURATION: 85 % | DIASTOLIC BLOOD PRESSURE: 74 MMHG | WEIGHT: 141.8 LBS | SYSTOLIC BLOOD PRESSURE: 116 MMHG

## 2021-01-01 VITALS
RESPIRATION RATE: 20 BRPM | HEART RATE: 80 BPM | BODY MASS INDEX: 17.76 KG/M2 | OXYGEN SATURATION: 95 % | SYSTOLIC BLOOD PRESSURE: 104 MMHG | HEIGHT: 74 IN | WEIGHT: 138.4 LBS | TEMPERATURE: 97.8 F | DIASTOLIC BLOOD PRESSURE: 64 MMHG

## 2021-01-01 DIAGNOSIS — G20 PARKINSON'S DISEASE (HCC): ICD-10-CM

## 2021-01-01 DIAGNOSIS — R73.9 HYPERGLYCEMIA: ICD-10-CM

## 2021-01-01 DIAGNOSIS — R29.898 WEAKNESS OF BOTH LOWER EXTREMITIES: ICD-10-CM

## 2021-01-01 DIAGNOSIS — Z09 HOSPITAL DISCHARGE FOLLOW-UP: Primary | ICD-10-CM

## 2021-01-01 DIAGNOSIS — M81.0 OSTEOPOROSIS, UNSPECIFIED OSTEOPOROSIS TYPE, UNSPECIFIED PATHOLOGICAL FRACTURE PRESENCE: ICD-10-CM

## 2021-01-01 DIAGNOSIS — R09.02 HYPOXIA: ICD-10-CM

## 2021-01-01 DIAGNOSIS — E78.2 MIXED HYPERLIPIDEMIA: ICD-10-CM

## 2021-01-01 DIAGNOSIS — Z91.81 AT HIGH RISK FOR FALLS: ICD-10-CM

## 2021-01-01 DIAGNOSIS — R77.8 ELEVATED TROPONIN: ICD-10-CM

## 2021-01-01 DIAGNOSIS — R06.02 SHORTNESS OF BREATH: ICD-10-CM

## 2021-01-01 DIAGNOSIS — R06.02 SHORTNESS OF BREATH: Primary | ICD-10-CM

## 2021-01-01 DIAGNOSIS — I25.5 ISCHEMIC CARDIOMYOPATHY: Primary | ICD-10-CM

## 2021-01-01 DIAGNOSIS — R13.12 OROPHARYNGEAL DYSPHAGIA: Primary | ICD-10-CM

## 2021-01-01 DIAGNOSIS — R55 NEAR SYNCOPE: Primary | ICD-10-CM

## 2021-01-01 DIAGNOSIS — R06.02 SOB (SHORTNESS OF BREATH): ICD-10-CM

## 2021-01-01 DIAGNOSIS — Z98.61 POST PTCA: Primary | ICD-10-CM

## 2021-01-01 DIAGNOSIS — I25.5 ISCHEMIC CARDIOMYOPATHY: ICD-10-CM

## 2021-01-01 DIAGNOSIS — I21.4 NSTEMI (NON-ST ELEVATED MYOCARDIAL INFARCTION) (HCC): Primary | ICD-10-CM

## 2021-01-01 DIAGNOSIS — E43 SEVERE PROTEIN-CALORIE MALNUTRITION (HCC): ICD-10-CM

## 2021-01-01 DIAGNOSIS — R13.10 DYSPHAGIA, UNSPECIFIED TYPE: ICD-10-CM

## 2021-01-01 DIAGNOSIS — R91.8 PULMONARY INFILTRATES: ICD-10-CM

## 2021-01-01 DIAGNOSIS — F03.90 DEMENTIA WITHOUT BEHAVIORAL DISTURBANCE, UNSPECIFIED DEMENTIA TYPE: ICD-10-CM

## 2021-01-01 DIAGNOSIS — E11.9 TYPE 2 DIABETES MELLITUS WITHOUT COMPLICATION, WITHOUT LONG-TERM CURRENT USE OF INSULIN (HCC): ICD-10-CM

## 2021-01-01 DIAGNOSIS — R26.9 GAIT ABNORMALITY: ICD-10-CM

## 2021-01-01 DIAGNOSIS — I10 ESSENTIAL HYPERTENSION: ICD-10-CM

## 2021-01-01 DIAGNOSIS — R41.89 COGNITIVE IMPAIRMENT: ICD-10-CM

## 2021-01-01 DIAGNOSIS — R11.2 NON-INTRACTABLE VOMITING WITH NAUSEA, UNSPECIFIED VOMITING TYPE: ICD-10-CM

## 2021-01-01 DIAGNOSIS — R13.12 OROPHARYNGEAL DYSPHAGIA: ICD-10-CM

## 2021-01-01 DIAGNOSIS — R41.89 COGNITIVE IMPAIRMENT: Primary | ICD-10-CM

## 2021-01-01 DIAGNOSIS — R29.810 FACIAL DROOP: ICD-10-CM

## 2021-01-01 LAB
A/G RATIO: 1.5 (ref 1.1–2.2)
A/G RATIO: 1.6 (ref 1.1–2.2)
ALBUMIN SERPL-MCNC: 3.9 G/DL (ref 3.4–5)
ALBUMIN SERPL-MCNC: 4.1 G/DL (ref 3.4–5)
ALP BLD-CCNC: 113 U/L (ref 40–129)
ALP BLD-CCNC: 162 U/L (ref 40–129)
ALT SERPL-CCNC: 37 U/L (ref 10–40)
ALT SERPL-CCNC: 51 U/L (ref 10–40)
ANION GAP SERPL CALCULATED.3IONS-SCNC: 10 MMOL/L (ref 3–16)
ANION GAP SERPL CALCULATED.3IONS-SCNC: 12 MMOL/L (ref 3–16)
ANION GAP SERPL CALCULATED.3IONS-SCNC: 19 MMOL/L (ref 3–16)
ANION GAP SERPL CALCULATED.3IONS-SCNC: 8 MMOL/L (ref 3–16)
ANISOCYTOSIS: ABNORMAL
APTT: 31.4 SEC (ref 24.2–36.2)
APTT: 59.5 SEC (ref 24.2–36.2)
APTT: 66.1 SEC (ref 24.2–36.2)
APTT: 68.3 SEC (ref 24.2–36.2)
APTT: 80.7 SEC (ref 24.2–36.2)
AST SERPL-CCNC: 61 U/L (ref 15–37)
AST SERPL-CCNC: 98 U/L (ref 15–37)
BANDED NEUTROPHILS RELATIVE PERCENT: 4 % (ref 0–7)
BASE EXCESS VENOUS: -4.2 MMOL/L (ref -3–3)
BASOPHILS ABSOLUTE: 0 K/UL (ref 0–0.2)
BASOPHILS ABSOLUTE: 0 K/UL (ref 0–0.2)
BASOPHILS RELATIVE PERCENT: 0 %
BASOPHILS RELATIVE PERCENT: 0.6 %
BILIRUB SERPL-MCNC: 0.4 MG/DL (ref 0–1)
BILIRUB SERPL-MCNC: 0.6 MG/DL (ref 0–1)
BILIRUBIN URINE: NEGATIVE
BLOOD, URINE: ABNORMAL
BUN BLDV-MCNC: 26 MG/DL (ref 7–20)
BUN BLDV-MCNC: 37 MG/DL (ref 7–20)
BUN BLDV-MCNC: 45 MG/DL (ref 7–20)
BUN BLDV-MCNC: 46 MG/DL (ref 7–20)
BURR CELLS: ABNORMAL
CALCIUM SERPL-MCNC: 9.1 MG/DL (ref 8.3–10.6)
CALCIUM SERPL-MCNC: 9.2 MG/DL (ref 8.3–10.6)
CALCIUM SERPL-MCNC: 9.2 MG/DL (ref 8.3–10.6)
CALCIUM SERPL-MCNC: 9.4 MG/DL (ref 8.3–10.6)
CARBOXYHEMOGLOBIN: 1.2 % (ref 0–1.5)
CHLORIDE BLD-SCNC: 103 MMOL/L (ref 99–110)
CHLORIDE BLD-SCNC: 105 MMOL/L (ref 99–110)
CHLORIDE BLD-SCNC: 105 MMOL/L (ref 99–110)
CHLORIDE BLD-SCNC: 96 MMOL/L (ref 99–110)
CHOLESTEROL, TOTAL: 137 MG/DL (ref 0–199)
CHP ED QC CHECK: YES
CLARITY: CLEAR
CO2: 21 MMOL/L (ref 21–32)
CO2: 26 MMOL/L (ref 21–32)
CO2: 26 MMOL/L (ref 21–32)
CO2: 29 MMOL/L (ref 21–32)
COLOR: YELLOW
CREAT SERPL-MCNC: 0.9 MG/DL (ref 0.8–1.3)
CREAT SERPL-MCNC: 0.9 MG/DL (ref 0.8–1.3)
CREAT SERPL-MCNC: 1 MG/DL (ref 0.8–1.3)
CREAT SERPL-MCNC: 1 MG/DL (ref 0.8–1.3)
EKG ATRIAL RATE: 300 BPM
EKG ATRIAL RATE: 79 BPM
EKG ATRIAL RATE: 83 BPM
EKG DIAGNOSIS: NORMAL
EKG P AXIS: 12 DEGREES
EKG P AXIS: 36 DEGREES
EKG P AXIS: 38 DEGREES
EKG P-R INTERVAL: 178 MS
EKG P-R INTERVAL: 186 MS
EKG Q-T INTERVAL: 440 MS
EKG Q-T INTERVAL: 478 MS
EKG Q-T INTERVAL: 490 MS
EKG QRS DURATION: 174 MS
EKG QRS DURATION: 176 MS
EKG QRS DURATION: 182 MS
EKG QTC CALCULATION (BAZETT): 504 MS
EKG QTC CALCULATION (BAZETT): 529 MS
EKG QTC CALCULATION (BAZETT): 561 MS
EKG R AXIS: -10 DEGREES
EKG R AXIS: -27 DEGREES
EKG R AXIS: -9 DEGREES
EKG T AXIS: 167 DEGREES
EKG T AXIS: 179 DEGREES
EKG T AXIS: 196 DEGREES
EKG VENTRICULAR RATE: 70 BPM
EKG VENTRICULAR RATE: 79 BPM
EKG VENTRICULAR RATE: 83 BPM
EOSINOPHILS ABSOLUTE: 0 K/UL (ref 0–0.6)
EOSINOPHILS ABSOLUTE: 0 K/UL (ref 0–0.6)
EOSINOPHILS RELATIVE PERCENT: 0 %
EOSINOPHILS RELATIVE PERCENT: 0.5 %
GFR AFRICAN AMERICAN: >60
GFR NON-AFRICAN AMERICAN: >60
GLOBULIN: 2.5 G/DL
GLOBULIN: 2.6 G/DL
GLUCOSE BLD-MCNC: 162 MG/DL (ref 70–99)
GLUCOSE BLD-MCNC: 167 MG/DL (ref 70–99)
GLUCOSE BLD-MCNC: 170 MG/DL (ref 70–99)
GLUCOSE BLD-MCNC: 171 MG/DL (ref 70–99)
GLUCOSE BLD-MCNC: 171 MG/DL (ref 70–99)
GLUCOSE BLD-MCNC: 179 MG/DL (ref 70–99)
GLUCOSE BLD-MCNC: 192 MG/DL (ref 70–99)
GLUCOSE BLD-MCNC: 204 MG/DL (ref 70–99)
GLUCOSE BLD-MCNC: 210 MG/DL
GLUCOSE BLD-MCNC: 210 MG/DL (ref 70–99)
GLUCOSE BLD-MCNC: 214 MG/DL (ref 70–99)
GLUCOSE BLD-MCNC: 219 MG/DL (ref 70–99)
GLUCOSE BLD-MCNC: 249 MG/DL (ref 70–99)
GLUCOSE BLD-MCNC: 301 MG/DL (ref 70–99)
GLUCOSE BLD-MCNC: 82 MG/DL (ref 70–99)
GLUCOSE URINE: 100 MG/DL
HCO3 VENOUS: 21 MMOL/L (ref 23–29)
HCT VFR BLD CALC: 36.4 % (ref 40.5–52.5)
HCT VFR BLD CALC: 36.5 % (ref 40.5–52.5)
HCT VFR BLD CALC: 37.1 % (ref 40.5–52.5)
HCT VFR BLD CALC: 37.3 % (ref 40.5–52.5)
HCT VFR BLD CALC: 37.7 % (ref 40.5–52.5)
HDLC SERPL-MCNC: 51 MG/DL (ref 40–60)
HEMOGLOBIN: 11.9 G/DL (ref 13.5–17.5)
HEMOGLOBIN: 12.3 G/DL (ref 13.5–17.5)
HEMOGLOBIN: 12.3 G/DL (ref 13.5–17.5)
HEMOGLOBIN: 12.4 G/DL (ref 13.5–17.5)
HEMOGLOBIN: 12.5 G/DL (ref 13.5–17.5)
INR BLD: 1.19 (ref 0.86–1.14)
INR BLD: 1.29 (ref 0.86–1.14)
KETONES, URINE: NEGATIVE MG/DL
LDL CHOLESTEROL CALCULATED: 65 MG/DL
LEUKOCYTE ESTERASE, URINE: ABNORMAL
LYMPHOCYTES ABSOLUTE: 0.4 K/UL (ref 1–5.1)
LYMPHOCYTES ABSOLUTE: 0.7 K/UL (ref 1–5.1)
LYMPHOCYTES RELATIVE PERCENT: 10 %
LYMPHOCYTES RELATIVE PERCENT: 11.7 %
MCH RBC QN AUTO: 30.7 PG (ref 26–34)
MCH RBC QN AUTO: 30.8 PG (ref 26–34)
MCH RBC QN AUTO: 30.8 PG (ref 26–34)
MCH RBC QN AUTO: 31.4 PG (ref 26–34)
MCH RBC QN AUTO: 31.8 PG (ref 26–34)
MCHC RBC AUTO-ENTMCNC: 32.7 G/DL (ref 31–36)
MCHC RBC AUTO-ENTMCNC: 33.2 G/DL (ref 31–36)
MCHC RBC AUTO-ENTMCNC: 33.2 G/DL (ref 31–36)
MCHC RBC AUTO-ENTMCNC: 33.4 G/DL (ref 31–36)
MCHC RBC AUTO-ENTMCNC: 33.5 G/DL (ref 31–36)
MCV RBC AUTO: 92.3 FL (ref 80–100)
MCV RBC AUTO: 92.9 FL (ref 80–100)
MCV RBC AUTO: 93.9 FL (ref 80–100)
MCV RBC AUTO: 94.7 FL (ref 80–100)
MCV RBC AUTO: 94.7 FL (ref 80–100)
METHEMOGLOBIN VENOUS: 0.3 %
MICROCYTES: ABNORMAL
MICROSCOPIC EXAMINATION: YES
MONOCYTES ABSOLUTE: 0.3 K/UL (ref 0–1.3)
MONOCYTES ABSOLUTE: 0.6 K/UL (ref 0–1.3)
MONOCYTES RELATIVE PERCENT: 10.2 %
MONOCYTES RELATIVE PERCENT: 6 %
MUCUS: ABNORMAL /LPF
NEUTROPHILS ABSOLUTE: 3.5 K/UL (ref 1.7–7.7)
NEUTROPHILS ABSOLUTE: 4.3 K/UL (ref 1.7–7.7)
NEUTROPHILS RELATIVE PERCENT: 77 %
NEUTROPHILS RELATIVE PERCENT: 80 %
NITRITE, URINE: NEGATIVE
O2 CONTENT, VEN: 14 VOL %
O2 SAT, VEN: 80 %
O2 THERAPY: ABNORMAL
OVALOCYTES: ABNORMAL
PCO2, VEN: 38.9 MMHG (ref 40–50)
PDW BLD-RTO: 14 % (ref 12.4–15.4)
PDW BLD-RTO: 14.2 % (ref 12.4–15.4)
PDW BLD-RTO: 14.3 % (ref 12.4–15.4)
PDW BLD-RTO: 14.5 % (ref 12.4–15.4)
PDW BLD-RTO: 14.8 % (ref 12.4–15.4)
PERFORMED ON: ABNORMAL
PERFORMED ON: NORMAL
PH UA: 5.5 (ref 5–8)
PH VENOUS: 7.35 (ref 7.35–7.45)
PLATELET # BLD: 173 K/UL (ref 135–450)
PLATELET # BLD: 176 K/UL (ref 135–450)
PLATELET # BLD: 182 K/UL (ref 135–450)
PLATELET # BLD: 186 K/UL (ref 135–450)
PLATELET # BLD: 193 K/UL (ref 135–450)
PMV BLD AUTO: 9 FL (ref 5–10.5)
PMV BLD AUTO: 9.1 FL (ref 5–10.5)
PMV BLD AUTO: 9.2 FL (ref 5–10.5)
PMV BLD AUTO: 9.4 FL (ref 5–10.5)
PMV BLD AUTO: 9.8 FL (ref 5–10.5)
PO2, VEN: 45.5 MMHG (ref 25–40)
POIKILOCYTES: ABNORMAL
POLYCHROMASIA: ABNORMAL
POTASSIUM REFLEX MAGNESIUM: 3.7 MMOL/L (ref 3.5–5.1)
POTASSIUM REFLEX MAGNESIUM: 4.3 MMOL/L (ref 3.5–5.1)
POTASSIUM REFLEX MAGNESIUM: 4.9 MMOL/L (ref 3.5–5.1)
POTASSIUM SERPL-SCNC: 4.5 MMOL/L (ref 3.5–5.1)
PRO-BNP: 9405 PG/ML (ref 0–449)
PRO-BNP: ABNORMAL PG/ML (ref 0–449)
PROCALCITONIN: 0.08 NG/ML (ref 0–0.15)
PROTEIN UA: NEGATIVE MG/DL
PROTHROMBIN TIME: 13.8 SEC (ref 10–13.2)
PROTHROMBIN TIME: 15 SEC (ref 10–13.2)
RBC # BLD: 3.86 M/UL (ref 4.2–5.9)
RBC # BLD: 3.88 M/UL (ref 4.2–5.9)
RBC # BLD: 3.92 M/UL (ref 4.2–5.9)
RBC # BLD: 4.04 M/UL (ref 4.2–5.9)
RBC # BLD: 4.06 M/UL (ref 4.2–5.9)
RBC UA: >100 /HPF (ref 0–4)
SARS-COV-2, NAAT: NOT DETECTED
SCHISTOCYTES: ABNORMAL
SODIUM BLD-SCNC: 136 MMOL/L (ref 136–145)
SODIUM BLD-SCNC: 141 MMOL/L (ref 136–145)
SODIUM BLD-SCNC: 141 MMOL/L (ref 136–145)
SODIUM BLD-SCNC: 142 MMOL/L (ref 136–145)
SPECIFIC GRAVITY UA: 1.01 (ref 1–1.03)
TCO2 CALC VENOUS: 22 MMOL/L
TEAR DROP CELLS: ABNORMAL
TOTAL PROTEIN: 6.5 G/DL (ref 6.4–8.2)
TOTAL PROTEIN: 6.6 G/DL (ref 6.4–8.2)
TRIGL SERPL-MCNC: 106 MG/DL (ref 0–150)
TROPONIN: 0.05 NG/ML
TROPONIN: 0.16 NG/ML
TROPONIN: 0.19 NG/ML
TROPONIN: 0.2 NG/ML
TROPONIN: 0.22 NG/ML
URINE REFLEX TO CULTURE: ABNORMAL
URINE TYPE: ABNORMAL
UROBILINOGEN, URINE: 0.2 E.U./DL
VLDLC SERPL CALC-MCNC: 21 MG/DL
WBC # BLD: 4.2 K/UL (ref 4–11)
WBC # BLD: 4.3 K/UL (ref 4–11)
WBC # BLD: 4.4 K/UL (ref 4–11)
WBC # BLD: 5.1 K/UL (ref 4–11)
WBC # BLD: 5.6 K/UL (ref 4–11)
WBC UA: ABNORMAL /HPF (ref 0–5)

## 2021-01-01 PROCEDURE — 85610 PROTHROMBIN TIME: CPT

## 2021-01-01 PROCEDURE — 6360000002 HC RX W HCPCS: Performed by: PHYSICIAN ASSISTANT

## 2021-01-01 PROCEDURE — 85027 COMPLETE CBC AUTOMATED: CPT

## 2021-01-01 PROCEDURE — 1200000000 HC SEMI PRIVATE

## 2021-01-01 PROCEDURE — 93005 ELECTROCARDIOGRAM TRACING: CPT | Performed by: EMERGENCY MEDICINE

## 2021-01-01 PROCEDURE — 2500000003 HC RX 250 WO HCPCS: Performed by: NURSE PRACTITIONER

## 2021-01-01 PROCEDURE — 80061 LIPID PANEL: CPT

## 2021-01-01 PROCEDURE — 6370000000 HC RX 637 (ALT 250 FOR IP): Performed by: PHYSICIAN ASSISTANT

## 2021-01-01 PROCEDURE — 0001A COVID-19, PFIZER VACCINE 30MCG/0.3ML DOSE: CPT | Performed by: FAMILY MEDICINE

## 2021-01-01 PROCEDURE — 99285 EMERGENCY DEPT VISIT HI MDM: CPT

## 2021-01-01 PROCEDURE — 96374 THER/PROPH/DIAG INJ IV PUSH: CPT

## 2021-01-01 PROCEDURE — 4040F PNEUMOC VAC/ADMIN/RCVD: CPT | Performed by: NURSE PRACTITIONER

## 2021-01-01 PROCEDURE — 99232 SBSQ HOSP IP/OBS MODERATE 35: CPT | Performed by: NURSE PRACTITIONER

## 2021-01-01 PROCEDURE — 96375 TX/PRO/DX INJ NEW DRUG ADDON: CPT

## 2021-01-01 PROCEDURE — G8427 DOCREV CUR MEDS BY ELIG CLIN: HCPCS | Performed by: NURSE PRACTITIONER

## 2021-01-01 PROCEDURE — 1123F ACP DISCUSS/DSCN MKR DOCD: CPT | Performed by: INTERNAL MEDICINE

## 2021-01-01 PROCEDURE — 36415 COLL VENOUS BLD VENIPUNCTURE: CPT

## 2021-01-01 PROCEDURE — 84484 ASSAY OF TROPONIN QUANT: CPT

## 2021-01-01 PROCEDURE — 85025 COMPLETE CBC W/AUTO DIFF WBC: CPT

## 2021-01-01 PROCEDURE — 2580000003 HC RX 258: Performed by: NURSE PRACTITIONER

## 2021-01-01 PROCEDURE — 6370000000 HC RX 637 (ALT 250 FOR IP): Performed by: NURSE PRACTITIONER

## 2021-01-01 PROCEDURE — 4040F PNEUMOC VAC/ADMIN/RCVD: CPT | Performed by: INTERNAL MEDICINE

## 2021-01-01 PROCEDURE — 1123F ACP DISCUSS/DSCN MKR DOCD: CPT | Performed by: NURSE PRACTITIONER

## 2021-01-01 PROCEDURE — 96372 THER/PROPH/DIAG INJ SC/IM: CPT

## 2021-01-01 PROCEDURE — 84145 PROCALCITONIN (PCT): CPT

## 2021-01-01 PROCEDURE — 99223 1ST HOSP IP/OBS HIGH 75: CPT | Performed by: INTERNAL MEDICINE

## 2021-01-01 PROCEDURE — 80053 COMPREHEN METABOLIC PANEL: CPT

## 2021-01-01 PROCEDURE — 87635 SARS-COV-2 COVID-19 AMP PRB: CPT

## 2021-01-01 PROCEDURE — 0002A PR IMM ADMN SARSCOV2 30MCG/0.3ML DIL RECON 2ND DOSE: CPT | Performed by: FAMILY MEDICINE

## 2021-01-01 PROCEDURE — 82803 BLOOD GASES ANY COMBINATION: CPT

## 2021-01-01 PROCEDURE — 1111F DSCHRG MED/CURRENT MED MERGE: CPT | Performed by: INTERNAL MEDICINE

## 2021-01-01 PROCEDURE — 71045 X-RAY EXAM CHEST 1 VIEW: CPT

## 2021-01-01 PROCEDURE — 85730 THROMBOPLASTIN TIME PARTIAL: CPT

## 2021-01-01 PROCEDURE — G8419 CALC BMI OUT NRM PARAM NOF/U: HCPCS | Performed by: INTERNAL MEDICINE

## 2021-01-01 PROCEDURE — 99214 OFFICE O/P EST MOD 30 MIN: CPT | Performed by: INTERNAL MEDICINE

## 2021-01-01 PROCEDURE — G8427 DOCREV CUR MEDS BY ELIG CLIN: HCPCS | Performed by: INTERNAL MEDICINE

## 2021-01-01 PROCEDURE — 94618 PULMONARY STRESS TESTING: CPT

## 2021-01-01 PROCEDURE — 99214 OFFICE O/P EST MOD 30 MIN: CPT | Performed by: NURSE PRACTITIONER

## 2021-01-01 PROCEDURE — 1036F TOBACCO NON-USER: CPT | Performed by: INTERNAL MEDICINE

## 2021-01-01 PROCEDURE — 93010 ELECTROCARDIOGRAM REPORT: CPT | Performed by: INTERNAL MEDICINE

## 2021-01-01 PROCEDURE — 94761 N-INVAS EAR/PLS OXIMETRY MLT: CPT

## 2021-01-01 PROCEDURE — 70496 CT ANGIOGRAPHY HEAD: CPT

## 2021-01-01 PROCEDURE — 99204 OFFICE O/P NEW MOD 45 MIN: CPT | Performed by: INTERNAL MEDICINE

## 2021-01-01 PROCEDURE — 83880 ASSAY OF NATRIURETIC PEPTIDE: CPT

## 2021-01-01 PROCEDURE — 91300 COVID-19, PFIZER VACCINE 30MCG/0.3ML DOSE: CPT | Performed by: FAMILY MEDICINE

## 2021-01-01 PROCEDURE — 93005 ELECTROCARDIOGRAM TRACING: CPT | Performed by: NURSE PRACTITIONER

## 2021-01-01 PROCEDURE — 81001 URINALYSIS AUTO W/SCOPE: CPT

## 2021-01-01 PROCEDURE — 2580000003 HC RX 258: Performed by: EMERGENCY MEDICINE

## 2021-01-01 PROCEDURE — 1111F DSCHRG MED/CURRENT MED MERGE: CPT | Performed by: NURSE PRACTITIONER

## 2021-01-01 PROCEDURE — 6360000004 HC RX CONTRAST MEDICATION: Performed by: EMERGENCY MEDICINE

## 2021-01-01 PROCEDURE — 80048 BASIC METABOLIC PNL TOTAL CA: CPT

## 2021-01-01 PROCEDURE — 2700000000 HC OXYGEN THERAPY PER DAY

## 2021-01-01 PROCEDURE — G8484 FLU IMMUNIZE NO ADMIN: HCPCS | Performed by: INTERNAL MEDICINE

## 2021-01-01 PROCEDURE — 99496 TRANSJ CARE MGMT HIGH F2F 7D: CPT | Performed by: NURSE PRACTITIONER

## 2021-01-01 PROCEDURE — 70450 CT HEAD/BRAIN W/O DYE: CPT

## 2021-01-01 RX ORDER — POLYETHYLENE GLYCOL 3350 17 G/17G
17 POWDER, FOR SOLUTION ORAL DAILY PRN
Status: DISCONTINUED | OUTPATIENT
Start: 2021-01-01 | End: 2021-01-01 | Stop reason: HOSPADM

## 2021-01-01 RX ORDER — DONEPEZIL HYDROCHLORIDE 5 MG/1
10 TABLET, FILM COATED ORAL NIGHTLY
Status: CANCELLED | OUTPATIENT
Start: 2021-01-01

## 2021-01-01 RX ORDER — HEPARIN SODIUM 1000 [USP'U]/ML
4000 INJECTION, SOLUTION INTRAVENOUS; SUBCUTANEOUS ONCE
Status: DISCONTINUED | OUTPATIENT
Start: 2021-01-01 | End: 2021-01-01

## 2021-01-01 RX ORDER — POTASSIUM CHLORIDE 1.5 G/1.77G
20 POWDER, FOR SOLUTION ORAL EVERY EVENING
Status: CANCELLED | OUTPATIENT
Start: 2021-01-01

## 2021-01-01 RX ORDER — DEXTROSE MONOHYDRATE 50 MG/ML
100 INJECTION, SOLUTION INTRAVENOUS PRN
Status: DISCONTINUED | OUTPATIENT
Start: 2021-01-01 | End: 2021-01-01 | Stop reason: HOSPADM

## 2021-01-01 RX ORDER — MORPHINE SULFATE 2 MG/ML
INJECTION, SOLUTION INTRAMUSCULAR; INTRAVENOUS
Status: DISCONTINUED
Start: 2021-01-01 | End: 2021-01-01 | Stop reason: WASHOUT

## 2021-01-01 RX ORDER — TAMSULOSIN HYDROCHLORIDE 0.4 MG/1
0.4 CAPSULE ORAL EVERY EVENING
Status: CANCELLED | OUTPATIENT
Start: 2021-01-01

## 2021-01-01 RX ORDER — METOPROLOL SUCCINATE 25 MG/1
12.5 TABLET, EXTENDED RELEASE ORAL DAILY
Status: DISCONTINUED | OUTPATIENT
Start: 2021-01-01 | End: 2021-01-01 | Stop reason: HOSPADM

## 2021-01-01 RX ORDER — ATORVASTATIN CALCIUM 40 MG/1
40 TABLET, FILM COATED ORAL NIGHTLY
Status: DISCONTINUED | OUTPATIENT
Start: 2021-01-01 | End: 2021-01-01 | Stop reason: HOSPADM

## 2021-01-01 RX ORDER — HEPARIN SODIUM 10000 [USP'U]/100ML
900 INJECTION, SOLUTION INTRAVENOUS CONTINUOUS
Status: DISCONTINUED | OUTPATIENT
Start: 2021-01-01 | End: 2021-01-01

## 2021-01-01 RX ORDER — PRAVASTATIN SODIUM 40 MG
40 TABLET ORAL NIGHTLY
Status: CANCELLED | OUTPATIENT
Start: 2021-01-01

## 2021-01-01 RX ORDER — ASPIRIN 325 MG
325 TABLET ORAL ONCE
Status: COMPLETED | OUTPATIENT
Start: 2021-01-01 | End: 2021-01-01

## 2021-01-01 RX ORDER — HEPARIN SODIUM 10000 [USP'U]/100ML
660 INJECTION, SOLUTION INTRAVENOUS CONTINUOUS
Status: DISCONTINUED | OUTPATIENT
Start: 2021-01-01 | End: 2021-01-01 | Stop reason: HOSPADM

## 2021-01-01 RX ORDER — SODIUM CHLORIDE 0.9 % (FLUSH) 0.9 %
5-40 SYRINGE (ML) INJECTION PRN
Status: DISCONTINUED | OUTPATIENT
Start: 2021-01-01 | End: 2021-04-17 | Stop reason: HOSPADM

## 2021-01-01 RX ORDER — CLOPIDOGREL BISULFATE 75 MG/1
75 TABLET ORAL EVERY EVENING
Status: CANCELLED | OUTPATIENT
Start: 2021-04-17

## 2021-01-01 RX ORDER — DEXTROSE MONOHYDRATE 25 G/50ML
12.5 INJECTION, SOLUTION INTRAVENOUS PRN
Status: DISCONTINUED | OUTPATIENT
Start: 2021-01-01 | End: 2021-01-01 | Stop reason: HOSPADM

## 2021-01-01 RX ORDER — ACETAMINOPHEN 325 MG/1
650 TABLET ORAL EVERY 6 HOURS PRN
Status: DISCONTINUED | OUTPATIENT
Start: 2021-01-01 | End: 2021-04-17 | Stop reason: HOSPADM

## 2021-01-01 RX ORDER — ASPIRIN 81 MG/1
81 TABLET, CHEWABLE ORAL DAILY
Status: DISCONTINUED | OUTPATIENT
Start: 2021-01-01 | End: 2021-01-01 | Stop reason: HOSPADM

## 2021-01-01 RX ORDER — METOPROLOL SUCCINATE 25 MG/1
12.5 TABLET, EXTENDED RELEASE ORAL EVERY EVENING
Status: CANCELLED | OUTPATIENT
Start: 2021-01-01

## 2021-01-01 RX ORDER — HEPARIN SODIUM 1000 [USP'U]/ML
2000 INJECTION, SOLUTION INTRAVENOUS; SUBCUTANEOUS PRN
Status: DISCONTINUED | OUTPATIENT
Start: 2021-01-01 | End: 2021-01-01 | Stop reason: HOSPADM

## 2021-01-01 RX ORDER — HEPARIN SODIUM 10000 [USP'U]/100ML
790 INJECTION, SOLUTION INTRAVENOUS CONTINUOUS
Status: DISCONTINUED | OUTPATIENT
Start: 2021-01-01 | End: 2021-01-01

## 2021-01-01 RX ORDER — FUROSEMIDE 20 MG/1
20 TABLET ORAL DAILY
Status: DISCONTINUED | OUTPATIENT
Start: 2021-01-01 | End: 2021-01-01 | Stop reason: HOSPADM

## 2021-01-01 RX ORDER — ASPIRIN 81 MG/1
81 TABLET, CHEWABLE ORAL DAILY
Status: CANCELLED | OUTPATIENT
Start: 2021-04-17

## 2021-01-01 RX ORDER — CLOPIDOGREL BISULFATE 75 MG/1
75 TABLET ORAL DAILY
Status: DISCONTINUED | OUTPATIENT
Start: 2021-01-01 | End: 2021-01-01 | Stop reason: HOSPADM

## 2021-01-01 RX ORDER — ONDANSETRON 2 MG/ML
4 INJECTION INTRAMUSCULAR; INTRAVENOUS EVERY 6 HOURS PRN
Status: DISCONTINUED | OUTPATIENT
Start: 2021-01-01 | End: 2021-04-17 | Stop reason: HOSPADM

## 2021-01-01 RX ORDER — POTASSIUM CHLORIDE 1.5 G/1.77G
20 POWDER, FOR SOLUTION ORAL DAILY
Qty: 30 EACH | Refills: 3 | Status: SHIPPED | OUTPATIENT
Start: 2021-01-01

## 2021-01-01 RX ORDER — SODIUM CHLORIDE 0.9 % (FLUSH) 0.9 %
10 SYRINGE (ML) INJECTION PRN
Status: DISCONTINUED | OUTPATIENT
Start: 2021-01-01 | End: 2021-01-01 | Stop reason: HOSPADM

## 2021-01-01 RX ORDER — SODIUM CHLORIDE 0.9 % (FLUSH) 0.9 %
10 SYRINGE (ML) INJECTION EVERY 12 HOURS SCHEDULED
Status: DISCONTINUED | OUTPATIENT
Start: 2021-01-01 | End: 2021-01-01 | Stop reason: HOSPADM

## 2021-01-01 RX ORDER — PROMETHAZINE HYDROCHLORIDE 25 MG/1
12.5 TABLET ORAL EVERY 6 HOURS PRN
Status: DISCONTINUED | OUTPATIENT
Start: 2021-01-01 | End: 2021-01-01 | Stop reason: HOSPADM

## 2021-01-01 RX ORDER — LISINOPRIL 2.5 MG/1
2.5 TABLET ORAL DAILY
Status: CANCELLED | OUTPATIENT
Start: 2021-04-17

## 2021-01-01 RX ORDER — ACETAMINOPHEN 325 MG/1
650 TABLET ORAL EVERY 6 HOURS PRN
Status: DISCONTINUED | OUTPATIENT
Start: 2021-01-01 | End: 2021-01-01 | Stop reason: HOSPADM

## 2021-01-01 RX ORDER — FUROSEMIDE 10 MG/ML
40 INJECTION INTRAMUSCULAR; INTRAVENOUS ONCE
Status: COMPLETED | OUTPATIENT
Start: 2021-01-01 | End: 2021-01-01

## 2021-01-01 RX ORDER — ONDANSETRON 2 MG/ML
4 INJECTION INTRAMUSCULAR; INTRAVENOUS EVERY 6 HOURS PRN
Status: DISCONTINUED | OUTPATIENT
Start: 2021-01-01 | End: 2021-01-01

## 2021-01-01 RX ORDER — PRAVASTATIN SODIUM 40 MG
40 TABLET ORAL NIGHTLY
Status: DISCONTINUED | OUTPATIENT
Start: 2021-01-01 | End: 2021-01-01 | Stop reason: HOSPADM

## 2021-01-01 RX ORDER — 0.9 % SODIUM CHLORIDE 0.9 %
1000 INTRAVENOUS SOLUTION INTRAVENOUS ONCE
Status: COMPLETED | OUTPATIENT
Start: 2021-01-01 | End: 2021-01-01

## 2021-01-01 RX ORDER — HEPARIN SODIUM 1000 [USP'U]/ML
30 INJECTION, SOLUTION INTRAVENOUS; SUBCUTANEOUS PRN
Status: DISCONTINUED | OUTPATIENT
Start: 2021-01-01 | End: 2021-01-01

## 2021-01-01 RX ORDER — DONEPEZIL HYDROCHLORIDE 5 MG/1
10 TABLET, FILM COATED ORAL NIGHTLY
Status: DISCONTINUED | OUTPATIENT
Start: 2021-01-01 | End: 2021-01-01 | Stop reason: HOSPADM

## 2021-01-01 RX ORDER — PROMETHAZINE HYDROCHLORIDE 25 MG/1
12.5 TABLET ORAL EVERY 6 HOURS PRN
Status: DISCONTINUED | OUTPATIENT
Start: 2021-01-01 | End: 2021-04-17 | Stop reason: HOSPADM

## 2021-01-01 RX ORDER — FUROSEMIDE 20 MG/1
20 TABLET ORAL DAILY
Status: CANCELLED | OUTPATIENT
Start: 2021-04-17

## 2021-01-01 RX ORDER — HEPARIN SODIUM 1000 [USP'U]/ML
4000 INJECTION, SOLUTION INTRAVENOUS; SUBCUTANEOUS PRN
Status: DISCONTINUED | OUTPATIENT
Start: 2021-01-01 | End: 2021-01-01 | Stop reason: HOSPADM

## 2021-01-01 RX ORDER — SODIUM CHLORIDE 0.9 % (FLUSH) 0.9 %
5-40 SYRINGE (ML) INJECTION EVERY 12 HOURS SCHEDULED
Status: DISCONTINUED | OUTPATIENT
Start: 2021-01-01 | End: 2021-04-17 | Stop reason: HOSPADM

## 2021-01-01 RX ORDER — ACETAMINOPHEN 650 MG/1
650 SUPPOSITORY RECTAL EVERY 6 HOURS PRN
Status: DISCONTINUED | OUTPATIENT
Start: 2021-01-01 | End: 2021-04-17 | Stop reason: HOSPADM

## 2021-01-01 RX ORDER — NICOTINE POLACRILEX 4 MG
15 LOZENGE BUCCAL PRN
Status: DISCONTINUED | OUTPATIENT
Start: 2021-01-01 | End: 2021-01-01 | Stop reason: HOSPADM

## 2021-01-01 RX ORDER — HEPARIN SODIUM 1000 [USP'U]/ML
60 INJECTION, SOLUTION INTRAVENOUS; SUBCUTANEOUS PRN
Status: DISCONTINUED | OUTPATIENT
Start: 2021-01-01 | End: 2021-01-01

## 2021-01-01 RX ORDER — LORAZEPAM 2 MG/ML
INJECTION INTRAMUSCULAR
Status: DISCONTINUED
Start: 2021-01-01 | End: 2021-01-01 | Stop reason: WASHOUT

## 2021-01-01 RX ORDER — SODIUM CHLORIDE 9 MG/ML
25 INJECTION, SOLUTION INTRAVENOUS PRN
Status: DISCONTINUED | OUTPATIENT
Start: 2021-01-01 | End: 2021-04-17 | Stop reason: HOSPADM

## 2021-01-01 RX ORDER — TAMSULOSIN HYDROCHLORIDE 0.4 MG/1
0.4 CAPSULE ORAL DAILY
Status: DISCONTINUED | OUTPATIENT
Start: 2021-01-01 | End: 2021-01-01 | Stop reason: HOSPADM

## 2021-01-01 RX ORDER — LISINOPRIL 2.5 MG/1
2.5 TABLET ORAL DAILY
Status: DISCONTINUED | OUTPATIENT
Start: 2021-01-01 | End: 2021-01-01 | Stop reason: HOSPADM

## 2021-01-01 RX ORDER — HEPARIN SODIUM 1000 [USP'U]/ML
4000 INJECTION, SOLUTION INTRAVENOUS; SUBCUTANEOUS PRN
Status: DISCONTINUED | OUTPATIENT
Start: 2021-01-01 | End: 2021-01-01

## 2021-01-01 RX ORDER — HEPARIN SODIUM 1000 [USP'U]/ML
2000 INJECTION, SOLUTION INTRAVENOUS; SUBCUTANEOUS PRN
Status: DISCONTINUED | OUTPATIENT
Start: 2021-01-01 | End: 2021-01-01

## 2021-01-01 RX ORDER — NITROGLYCERIN 0.4 MG/1
0.4 TABLET SUBLINGUAL EVERY 5 MIN PRN
Status: DISCONTINUED | OUTPATIENT
Start: 2021-01-01 | End: 2021-01-01 | Stop reason: HOSPADM

## 2021-01-01 RX ORDER — ACETAMINOPHEN 650 MG/1
650 SUPPOSITORY RECTAL EVERY 6 HOURS PRN
Status: DISCONTINUED | OUTPATIENT
Start: 2021-01-01 | End: 2021-01-01 | Stop reason: HOSPADM

## 2021-01-01 RX ORDER — POLYETHYLENE GLYCOL 3350 17 G/17G
17 POWDER, FOR SOLUTION ORAL DAILY PRN
Status: DISCONTINUED | OUTPATIENT
Start: 2021-01-01 | End: 2021-04-17 | Stop reason: HOSPADM

## 2021-01-01 RX ADMIN — CLOPIDOGREL BISULFATE 75 MG: 75 TABLET ORAL at 11:03

## 2021-01-01 RX ADMIN — ENOXAPARIN SODIUM 70 MG: 80 INJECTION SUBCUTANEOUS at 18:21

## 2021-01-01 RX ADMIN — CARBIDOPA AND LEVODOPA 1.5 TABLET: 25; 100 TABLET ORAL at 11:41

## 2021-01-01 RX ADMIN — INSULIN LISPRO 6 UNITS: 100 INJECTION, SOLUTION INTRAVENOUS; SUBCUTANEOUS at 13:14

## 2021-01-01 RX ADMIN — HEPARIN SODIUM 790 UNITS/HR: 10000 INJECTION, SOLUTION INTRAVENOUS at 05:16

## 2021-01-01 RX ADMIN — HEPARIN SODIUM 790 UNITS/HR: 10000 INJECTION, SOLUTION INTRAVENOUS at 03:42

## 2021-01-01 RX ADMIN — HEPARIN SODIUM 900 UNITS/HR: 10000 INJECTION, SOLUTION INTRAVENOUS at 17:52

## 2021-01-01 RX ADMIN — FUROSEMIDE 20 MG: 20 TABLET ORAL at 11:41

## 2021-01-01 RX ADMIN — CARBIDOPA AND LEVODOPA 1.5 TABLET: 25; 100 TABLET ORAL at 13:18

## 2021-01-01 RX ADMIN — LISINOPRIL 2.5 MG: 2.5 TABLET ORAL at 11:41

## 2021-01-01 RX ADMIN — METOPROLOL SUCCINATE 12.5 MG: 25 TABLET, EXTENDED RELEASE ORAL at 07:48

## 2021-01-01 RX ADMIN — CARBIDOPA AND LEVODOPA 1.5 TABLET: 25; 100 TABLET ORAL at 07:48

## 2021-01-01 RX ADMIN — ATORVASTATIN CALCIUM 40 MG: 40 TABLET, FILM COATED ORAL at 21:42

## 2021-01-01 RX ADMIN — DONEPEZIL HYDROCHLORIDE 10 MG: 5 TABLET, FILM COATED ORAL at 00:57

## 2021-01-01 RX ADMIN — METOPROLOL SUCCINATE 12.5 MG: 25 TABLET, EXTENDED RELEASE ORAL at 11:41

## 2021-01-01 RX ADMIN — ASPIRIN 325 MG: 325 TABLET, FILM COATED ORAL at 17:51

## 2021-01-01 RX ADMIN — TAMSULOSIN HYDROCHLORIDE 0.4 MG: 0.4 CAPSULE ORAL at 07:48

## 2021-01-01 RX ADMIN — FUROSEMIDE 20 MG: 20 TABLET ORAL at 07:48

## 2021-01-01 RX ADMIN — Medication 10 ML: at 20:51

## 2021-01-01 RX ADMIN — CARBIDOPA AND LEVODOPA 1.5 TABLET: 25; 100 TABLET ORAL at 20:50

## 2021-01-01 RX ADMIN — IOPAMIDOL 75 ML: 755 INJECTION, SOLUTION INTRAVENOUS at 12:23

## 2021-01-01 RX ADMIN — TAMSULOSIN HYDROCHLORIDE 0.4 MG: 0.4 CAPSULE ORAL at 11:51

## 2021-01-01 RX ADMIN — PRAVASTATIN SODIUM 40 MG: 40 TABLET ORAL at 20:50

## 2021-01-01 RX ADMIN — HEPARIN SODIUM 4000 UNITS: 1000 INJECTION INTRAVENOUS; SUBCUTANEOUS at 17:51

## 2021-01-01 RX ADMIN — FUROSEMIDE 40 MG: 10 INJECTION, SOLUTION INTRAMUSCULAR; INTRAVENOUS at 16:22

## 2021-01-01 RX ADMIN — DONEPEZIL HYDROCHLORIDE 10 MG: 5 TABLET, FILM COATED ORAL at 20:51

## 2021-01-01 RX ADMIN — LISINOPRIL 2.5 MG: 2.5 TABLET ORAL at 07:47

## 2021-01-01 RX ADMIN — ASPIRIN 81 MG: 81 TABLET, CHEWABLE ORAL at 07:48

## 2021-01-01 RX ADMIN — CLOPIDOGREL BISULFATE 75 MG: 75 TABLET ORAL at 07:48

## 2021-01-01 RX ADMIN — CARBIDOPA AND LEVODOPA 1.5 TABLET: 25; 100 TABLET ORAL at 15:04

## 2021-01-01 RX ADMIN — SODIUM CHLORIDE 1000 ML: 9 INJECTION, SOLUTION INTRAVENOUS at 14:42

## 2021-01-01 RX ADMIN — METOPROLOL TARTRATE 12.5 MG: 25 TABLET, FILM COATED ORAL at 18:21

## 2021-01-01 RX ADMIN — PRAVASTATIN SODIUM 40 MG: 40 TABLET ORAL at 00:57

## 2021-01-01 RX ADMIN — ASPIRIN 81 MG: 81 TABLET, CHEWABLE ORAL at 11:41

## 2021-01-01 ASSESSMENT — PAIN SCALES - GENERAL
PAINLEVEL_OUTOF10: 0

## 2021-01-01 ASSESSMENT — ENCOUNTER SYMPTOMS
WHEEZING: 0
FACIAL SWELLING: 0
SORE THROAT: 0
CONSTIPATION: 0
DIARRHEA: 0
BLOOD IN STOOL: 0
TROUBLE SWALLOWING: 1
EYE DISCHARGE: 0
GASTROINTESTINAL NEGATIVE: 1
COUGH: 0
ABDOMINAL PAIN: 0
VOMITING: 0
SHORTNESS OF BREATH: 1
NAUSEA: 0
NAUSEA: 0
SHORTNESS OF BREATH: 1
SINUS PRESSURE: 0
DIARRHEA: 0
VOMITING: 0
COUGH: 0

## 2021-01-01 ASSESSMENT — SLEEP AND FATIGUE QUESTIONNAIRES
NECK CIRCUMFERENCE (INCHES): 15
HOW LIKELY ARE YOU TO NOD OFF OR FALL ASLEEP WHILE SITTING INACTIVE IN A PUBLIC PLACE: 0
HOW LIKELY ARE YOU TO NOD OFF OR FALL ASLEEP IN A CAR, WHILE STOPPED FOR A FEW MINUTES IN TRAFFIC: 0
HOW LIKELY ARE YOU TO NOD OFF OR FALL ASLEEP WHILE SITTING AND READING: 0
HOW LIKELY ARE YOU TO NOD OFF OR FALL ASLEEP WHILE LYING DOWN TO REST IN THE AFTERNOON WHEN CIRCUMSTANCES PERMIT: 2
HOW LIKELY ARE YOU TO NOD OFF OR FALL ASLEEP WHILE SITTING AND TALKING TO SOMEONE: 0
ESS TOTAL SCORE: 4

## 2021-01-01 ASSESSMENT — PATIENT HEALTH QUESTIONNAIRE - PHQ9: SUM OF ALL RESPONSES TO PHQ QUESTIONS 1-9: 0

## 2021-01-13 NOTE — PROGRESS NOTES
clopidogrel (PLAVIX) 75 MG tablet Take 1 tablet by mouth once daily Yes TEMITOPE Green CNP   lisinopril (PRINIVIL;ZESTRIL) 2.5 MG tablet Take 1 tablet by mouth once daily Yes TEMITOPE Mckay CNP   furosemide (LASIX) 20 MG tablet Take 20 mg by mouth daily Yes Historical Provider, MD   potassium chloride (KLOR-CON M) 10 MEQ extended release tablet Take 10 mEq by mouth 2 times daily Yes Historical Provider, MD   pravastatin (PRAVACHOL) 40 MG tablet Take 1 tablet by mouth once daily Yes TEMITOPE Green CNP   metFORMIN (GLUCOPHAGE) 500 MG tablet TAKE 1 TABLET BY MOUTH TWICE DAILY WITH MEALS . INCREASE  IN  4  WEEKS  TO  2  TABLETS  TWICE  DAILY  WITH  MEALS Yes Christopher Robison MD   tamsulosin (FLOMAX) 0.4 MG capsule Take 1 capsule by mouth once daily Yes TEMITOPE Mckay CNP   aspirin 81 MG chewable tablet Take 1 tablet by mouth daily Yes JD Jackson   metoprolol succinate (TOPROL XL) 25 MG extended release tablet Take 0.5 tablets by mouth daily Yes JD Jackson   carbidopa-levodopa (SINEMET)  MG per tablet Take 1.5 tablets by mouth 3 times daily  Yes Historical Provider, MD   therapeutic multivitamin-minerals (THERAGRAN-M) tablet Take 1 tablet by mouth daily.  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) ([facility] [address]     Pacer at end of battery life     Post PTCA     S/P lens implant    , Social History     Tobacco Use    Smoking status: Current Every Day Smoker     Packs/day: 0.50     Types: Cigarettes, Pipe, Cigars     Last attempt to quit: 1956     Years since quittin.0    Smokeless tobacco: Never Used   Substance Use Topics    Alcohol use: Yes     Comment: 1 GLASS WINE  A DAY    Drug use: No   ,   Family History   Problem Relation Age of Onset    Cancer Mother     Diabetes Mother     Alzheimer's Disease Father        PHYSICAL EXAMINATION:  [ INSTRUCTIONS:  \"[x]\" Indicates a positive item  \"[]\" Indicates a negative item  -- DELETE ALL ITEMS NOT EXAMINED]  Vital Signs: (As obtained by patient/caregiver or practitioner observation)    Blood pressure-  Heart rate-    Respiratory rate-    Temperature-  Pulse oximetry-     Constitutional: [x] Appears well-developed and well-nourished [x] No apparent distress      [] Abnormal-   Mental status  [x] Alert and awake  [x] Oriented to person/place/time [x]Able to follow commands      Eyes:  EOM    [x]  Normal  [] Abnormal-  Sclera  [x]  Normal  [] Abnormal -         Discharge [x]  None visible  [] Abnormal -    HENT:   [x] Normocephalic, atraumatic.   [] Abnormal   [x] Mouth/Throat: Mucous membranes are moist.     External Ears [x] Normal  [] Abnormal-     Neck: [x] No visualized mass     Pulmonary/Chest: [x] Respiratory effort normal.  [x] No visualized signs of difficulty breathing or respiratory distress        [] Abnormal-      Musculoskeletal:   [] Normal gait with no signs of ataxia         [] Normal range of motion of neck        [x] Abnormal- Unstable gait with cane    Neurological:        [x] No Facial Asymmetry (Cranial nerve 7 motor function) (limited exam to video visit)          [x] No gaze palsy        [] Abnormal-         Skin:        [x] No significant exanthematous lesions or discoloration noted on facial skin         [] Abnormal-            Psychiatric:       [] Normal Affect [] No Hallucinations [x] Abnormal- Oriented x2  Other pertinent observable physical exam findings-     ASSESSMENT/PLAN:  1. Cognitive impairment  Monitor closely. Maintain medication, Aricept    2. Parkinson's disease ([address]  Monitor, f/u Neurology, stable. 3. Gait abnormality  Refer to [facility] PT. Work on walking 10 minutes each time he uses restroom    4. Weakness of both lower extremities  Refer to [facility] PT. Enc to walk 10 minutes each time he uses restroom    5. Type 2 diabetes mellitus without complication, without long-term current use of insulin (HCC)  Enc to monitor FBS daily for 1 week and update our office with readings. May need to increase Metformin    6. Mixed hyperlipidemia  Stable, maintain Pravastatin    7. Essential hypertension  Stable, maintain Lisinopril    8. Dysphagia  Assess with [facility] speech evaluation    9. Risk of falls  [facility] referral for pT      Return in about 6 months (around 7/13/2021) for 30 min appt, IO, HLD, DM, HTN, A1C. Bryant Rdz is a 80 y.o. male being evaluated by a Virtual Visit (video visit) encounter to address concerns as mentioned above. A caregiver was present when appropriate. Due to this being a TeleHealth encounter (During [place] [address] public health emergency), evaluation of the following organ systems was limited: Vitals/Constitutional/EENT/Resp/CV/GI//MS/Neuro/Skin/Heme-Lymph-Imm. Pursuant to the emergency declaration under the [facility], [address] [place] [address] authority and the [address] and [employer] Act, this Virtual Visit was conducted with patient's (and/or legal guardian's) consent, to reduce the patient's risk of exposure to COVID-19 and provide necessary medical care. The patient (and/or legal guardian) has also been advised to contact this office for worsening conditions or problems, and seek emergency medical treatment and/or call 911 if deemed necessary. Patient identification was verified at the start of the visit: Yes    Total time spent on this encounter: 20 minutes    Services were provided through a video synchronous discussion virtually to substitute for in-person clinic visit. Patient and provider were located at their individual homes. --TEMITOPE Anderson - CNP on 1/13/2021 at 8:18 PM    An electronic signature was used to authenticate this note.

## 2021-01-14 NOTE — TELEPHONE ENCOUNTER
Pt needs [facility]. Please submit, \"taxing effort\" to leave home. Recommend PT d/t high risk of falls. Speech evaluation d/t chewing pills and dysphagia.

## 2021-01-15 NOTE — TELEPHONE ENCOUNTER
This can wait until Monday. .. FYI:  Nurse Ziggy De La Cruz with [facility] asking if Fabian Buchanan will sign orders. Because she is not an MD, she cannot, so I gave a verbal for Dr Shannan Rojas to sign orders. Also, HUBER Miranda called. She needed a verbal again for PT, ST, and Home care. I gave verbal     Nurse notes that the [address] patient is taking is 10 meq, and the dosage on the referral is different. Asking for the correct dosage      And, she also wants to know if the diagnosis is correct . [address] which is cardiology. She notes that it has been awhile since he has had a cardiac dx and asks if this dx needs to be changed.       Please call margaret back at [phone]

## 2021-01-15 NOTE — TELEPHONE ENCOUNTER
Patient made aware he is eligible for the covid vaccine. Will call back to schedule if interested in receiving.

## 2021-01-18 NOTE — TELEPHONE ENCOUNTER
Klor con is 10meq twice a day and I switched to a packet powder 20meq once daily.      Dx should be:   Cognitive impairment  Gait abnormality  At risk of falls  Dysphagia  Weakness of BLE

## 2021-01-18 NOTE — TELEPHONE ENCOUNTER
[phone]  Wrong number. I called [facility] , to have her call the office. See information from Jemma.

## 2021-01-19 NOTE — TELEPHONE ENCOUNTER
Nurse calling back - I gave her info per Mitch Crocker, and also a verbal for dr. Barber Gaston to sign orders.

## 2021-01-27 NOTE — TELEPHONE ENCOUNTER
pts daughter and son-in-law feel he has a lot of spots that could be cancerous and want a referral to derm.

## 2021-03-02 NOTE — TELEPHONE ENCOUNTER
----- Message from Yash Sariahny sent at 3/2/2021 11:33 AM EST -----  Subject: Referral Request    QUESTIONS   Reason for referral request? Patient's son in law called to get a referral   to a dermatologist to take a look at some whiteish colored spots on his   hand   leg and back. Has the physician seen you for this condition before? No   Preferred Specialist (if applicable)? Do you already have an appointment scheduled? No  Additional Information for Provider? Patient's son in law would need a   call back with the dermatologists phone number and name in order to get   the appt scheduled. his name is [email or web address].  ---------------------------------------------------------------------------  --------------  [address]  What is the best way for the office to contact you? OK to leave message on   voicemail  Preferred Call Back Phone Number?  [phone]

## 2021-03-03 NOTE — TELEPHONE ENCOUNTER
Katrina Mccoy, please send info through [address] [place] [address] [phone] and let them know these #s have been sent.

## 2021-03-05 NOTE — ED NOTES
1723 - [place] [address]  03/05/21 [facility] called and we now are calling [facility] for admission     Shaina Zepeda  03/05/21 5316

## 2021-03-05 NOTE — ED PROVIDER NOTES
I independently examined and evaluated Mariia Walton. All diagnostic, treatment, and disposition decisions were made by myself in conjunction with the advanced practice provider. For all further details of the patient's emergency department visit, please see the advanced practice provider's documentation. Primary Care Physician: TEMITOPE Leslie - CNP    History: This is a 80 y.o. male who presents to the Emergency Department with complaint of shortness of breath, fatigue. Does have history of congestive heart failure with EF of 25%. Does have a history of dementia however patient is providing own history in room denying chest pain. Family member in room states the patient has been more fatigued than normal, he is tachypneic, requiring oxygen. Patient's EKG did show new ST depressions no STEMI criteria not meeting scar posterior criteria. His troponin was elevated on arrival, and was increasing on repeat    See MARTINA note for cards consult documentation however cards did recommend treating with Lovenox, transferred to [facility]      Physical:     height is 6' (1.829 m) and weight is 165 lb (74.8 kg). His oral temperature is 97.5 °F (36.4 °C). His blood pressure is 109/69 and his pulse is 81. His respiration is 20 and oxygen saturation is 100%. 80 y.o. male   Frail-appearing  Heart regular rhythm  Lungs diminished      Impression: NSTEMI    Plan: Transfer [facility] was at    EKG Interpretation    The Ekg interpreted by me shows  normal sinus rhythm with a rate of 79  Axis is   Left axis deviation  QTc is  504  LBBB      ST Segments: Patient is slightly more depressed in lead I, subtle depression lead aVL which does appear to be new, and new subtle depression in V5, otherwise patient has persistent left bundle branch block however does not meet Osmar's or criteria for STEMI.   No active chest pain, compared EKG dated 4/26/2020        CRITICAL CARE: There was a high probability of clinically significant/life threatening deterioration in this patient's condition which required my urgent intervention. Total critical care time was 15 NSTEMI minutes. This excludes any time for separately reportable procedures. Cards consult, lab Emiliano cooper DO  Emergency Physician        Comment: Please note this report has been produced using speech recognition software and may contain errors related to that system including errors in grammar, punctuation, and spelling, as well as words and phrases that may be inappropriate. If there are any questions or concerns please feel free to contact the dictating provider for clarification.           Emiliano Lal DO  03/05/21 3989

## 2021-03-05 NOTE — ED NOTES
Patient has [employer] pacemaker, interrogated with no problems. Patient has no needs at this time. Lynette Hernandez RN  03/05/21 3623  Representative from [employer] called with report, will fax strips and report. Patient is only being paced . 6% of the time. Had one run of atrial tachycardia today but otherwise he states the device is functioning correctly.      Lynette Hernandez RN  03/05/21 5907

## 2021-03-05 NOTE — ED PROVIDER NOTES
[address] ED  EMERGENCY DEPARTMENT ENCOUNTER        Pt Name: Kimberlee Warren  MRN: [id number]  [place] 1935  Date of evaluation: 3/5/2021  Provider: Malinda Avilez PA-C  PCP: KAYLYN, Hosptalist     I have seen and evaluated this patient with my supervising physician Dino Melendez DO.    CHIEF COMPLAINT       Chief Complaint   Patient presents with    Shortness of Breath     speech therapist at house today and advised vital were erratic and oxygen was low, pt denies complaints currently, family states has been short of breath recently       HISTORY OF PRESENT ILLNESS   (Location, Timing/Onset, Context/Setting, Quality, Duration, Modifying Factors, Severity, Associated Signs and Symptoms)  Note limiting factors. Kimberlee Warren is a 80 y.o. male with a past medical history of hypertension, hyperlipidemia, diabetes, cognitive impairment, CAD, dementia, Parkinson's brought in today for evaluation of shortness of breath. Per brother-in-law at bedside patient had his speech therapist at his house today and they noticed a drop in his pulse ox and recommended coming to the ED for further evaluation. Per brother-in-law they have noticed that he has been more short of breath with little activity. She denied any cough or congestion. Denied any fevers chills nausea vomiting or abdominal pain. Denied any chest pain. Onset of symptoms have been over the past week or so. Duration of symptoms have been persistent since onset. Context includes shortness of breath. No aggravating complaints. No alleviating complaints. Denies any sick contacts or exposure to COVID-19. He did receive his COVID-19 vaccine. Patient does have a history of CHF and does take Lasix 20 mg at home. Otherwise denies any other complaints. No aggravating symptoms. No alleviating symptoms. Nursing Notes were all reviewed and agreed with or any disagreements were addressed in the HPI.     REVIEW OF SYSTEMS (2-9 systems for level 4, 10 or more for level 5)     Review of Systems   Constitutional: Negative. Respiratory: Positive for shortness of breath. Cardiovascular: Negative. Gastrointestinal: Negative. Genitourinary: Negative. Musculoskeletal: Negative. Skin: Negative. Neurological: Negative. Positives and Pertinent negatives as per HPI. Except as noted above in the ROS, all other systems were reviewed and negative. 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) ([facility] [address]     Pacer at end of battery life     Post PTCA     S/P lens implant          SURGICAL HISTORY     Past Surgical History:   Procedure Laterality Date    COLONOSCOPY      EYE SURGERY      HERNIA REPAIR           CURRENTMEDICATIONS       Previous Medications    ASPIRIN 81 MG CHEWABLE TABLET    Take 1 tablet by mouth daily    CARBIDOPA-LEVODOPA (SINEMET)  MG PER TABLET    Take 1.5 tablets by mouth 3 times daily     CLOPIDOGREL (PLAVIX) 75 MG TABLET    Take 1 tablet by mouth once daily    DONEPEZIL (ARICEPT) 10 MG TABLET    TAKE 1 TABLET BY MOUTH NIGHTLY FOR  MEMORY    FUROSEMIDE (LASIX) 20 MG TABLET    Take 20 mg by mouth daily    LISINOPRIL (PRINIVIL;ZESTRIL) 2.5 MG TABLET    Take 1 tablet by mouth once daily    METFORMIN (GLUCOPHAGE) 500 MG TABLET    TAKE 1 TABLET BY MOUTH TWICE DAILY WITH MEALS .   INCREASE  IN  4  WEEKS  TO  2  TABLETS  TWICE  DAILY  WITH  MEALS    METOPROLOL SUCCINATE (TOPROL XL) 25 MG EXTENDED RELEASE TABLET    Take 0.5 tablets by mouth daily    POTASSIUM CHLORIDE (KLOR-CON M) 10 MEQ EXTENDED RELEASE TABLET    Take 10 mEq by mouth 2 times daily    POTASSIUM CHLORIDE (KLOR-CON) 20 MEQ PACKET    Take 20 mEq by mouth daily    PRAVASTATIN (PRAVACHOL) 40 MG TABLET    Take 1 tablet by mouth once daily    TAMSULOSIN (FLOMAX) 0.4 MG CAPSULE    Take 1 capsule by mouth once daily    THERAPEUTIC MULTIVITAMIN-MINERALS (THERAGRAN-M) TABLET    Take 1 tablet by mouth daily. ALLERGIES     Pcn [penicillins]    FAMILYHISTORY       Family History   Problem Relation Age of Onset    Cancer Mother     Diabetes Mother     Alzheimer's Disease Father           SOCIAL HISTORY       Social History     Tobacco Use    Smoking status: Former Smoker     Packs/day: 0.50     Types: Cigarettes, Pipe, Cigars     Quit date: 1956     Years since quittin.2    Smokeless tobacco: Never Used   Substance Use Topics    Alcohol use: Not Currently     Comment: 1 GLASS WINE  A DAY    Drug use: No       SCREENINGS    Leandro Coma Scale  Eye Opening: Spontaneous  Best Verbal Response: Oriented  Best Motor Response: Obeys commands  Leandro Coma Scale Score: 15        PHYSICAL EXAM    (up to 7 for level 4, 8 or more for level 5)     ED Triage Vitals   BP Temp Temp Source Pulse Resp SpO2 Height Weight   21 1441 21 1444 21 1444 21 1441 21 1441 21 1441 21 1441 21 1441   109/69 97.5 °F (36.4 °C) Oral 81 20 100 % 6' (1.829 m) 165 lb (74.8 kg)       Physical Exam  Vitals signs and nursing note reviewed. Constitutional:       General: He is awake. He is not in acute distress. Appearance: Normal appearance. He is well-developed. He is not ill-appearing, toxic-appearing or diaphoretic. HENT:      Head: Normocephalic and atraumatic. Nose: Nose normal.   Eyes:      General:         Right eye: No discharge. Left eye: No discharge. Neck:      Musculoskeletal: Normal range of motion and neck supple. Cardiovascular:      Rate and Rhythm: Normal rate and regular rhythm. Pulses:           Radial pulses are 2+ on the right side and 2+ on the left side. Heart sounds: Normal heart sounds. No murmur. No gallop. Pulmonary:      Effort: Pulmonary effort is normal. No respiratory distress. Breath sounds: Normal breath sounds. No decreased breath sounds, wheezing, rhonchi or rales. Chest:      Chest wall: No tenderness. Musculoskeletal: Normal range of motion. General: No deformity. Right lower leg: No edema. Left lower leg: No edema. Skin:     General: Skin is warm and dry. Neurological:      General: No focal deficit present. Mental Status: He is alert and oriented to person, place, and time. GCS: GCS eye subscore is 4. GCS verbal subscore is 5. GCS motor subscore is 6. Psychiatric:         Behavior: Behavior normal. Behavior is cooperative.          DIAGNOSTIC RESULTS   LABS:    Labs Reviewed   CBC WITH AUTO DIFFERENTIAL - Abnormal; Notable for the following components:       Result Value    RBC 3.92 (*)     Hemoglobin 12.3 (*)     Hematocrit 37.1 (*)     Lymphocytes Absolute 0.7 (*)     All other components within normal limits    Narrative:     Performed at:  [facility],  [place]   Phone [phone]   COMPREHENSIVE METABOLIC PANEL W/ REFLEX TO MG FOR LOW K - Abnormal; Notable for the following components:    Glucose 301 (*)     BUN 45 (*)     AST 61 (*)     All other components within normal limits    Narrative:     Performed at:  [facility],  [employer]   Phone [phone]   TROPONIN - Abnormal; Notable for the following components:    Troponin 0.05 (*)     All other components within normal limits    Narrative:     Performed at:  [facility]) [facility],  [place], [employer]   Phone [phone]   BRAIN NATRIURETIC PEPTIDE - Abnormal; Notable for the following components:    Pro-BNP 9,405 (*)     All other components within normal limits    Narrative:     Performed at:  [facility]  [place] [address],  [place], [facility]   Phone [phone]   PROTIME-INR - Abnormal; Notable for the following components:    Protime 13.8 (*)     INR 1.19 (*)     All other components within normal limits    Narrative:     Performed at:  [facility] [place] [facility],  [place], [facility]   Phone [phone]   BLOOD GAS, VENOUS - Abnormal; Notable for the following components:    pCO2, Jerry 38.9 (*)     pO2, Jerry 45.5 (*)     HCO3, Venous 21.0 (*)     Base Excess, Jerry -4.2 (*)     All other components within normal limits    Narrative:     Performed at:  [facility]) [place] [address],  [place], [facility]   Phone [phone]   TROPONIN - Abnormal; Notable for the following components:    Troponin 0.16 (*)     All other components within normal limits    Narrative:     Performed at:  [place] [address],  [place], [facility]   Phone [phone], RAPID    Narrative:     ORDER WAS CANCELLED 03/05/2021 18:09, COVRG. Performed at:  [place] [address],  [place], [facility]   Phone [phone]   APTT    Narrative:     Performed at:  [place] [address],  [place], [facility]   Phone [phone]   PROCALCITONIN    Narrative:     Performed at:  [facility]) [place] [address],  [place], [facility]   Phone [phone]   PROTIME-INR   APTT   APTT   APTT   APTT       All other labs were within normal range or not returned as of this dictation. EKG: All EKG's are interpreted by the Emergency Department Physician in the absence of a cardiologist.  Please see their note for interpretation of EKG.       RADIOLOGY:   Non-plain film images such as CT, Ultrasound and MRI are read by the radiologist. Plain radiographic images are visualized and preliminarily interpreted by the ED Provider with the below findings:        Interpretation per the Radiologist below, if available at the time of this note:    XR CHEST PORTABLE   Final Result   1. Cardiomegaly with no evidence of pulmonary edema   2. Bibasilar opacities, atelectasis or pneumonia           Xr Chest Portable    Result Date: 3/5/2021  EXAMINATION: ONE XRAY VIEW OF THE CHEST 3/5/2021 2:44 pm COMPARISON: 04/26/2020 HISTORY: ORDERING SYSTEM PROVIDED HISTORY: sob TECHNOLOGIST PROVIDED HISTORY: Reason for exam:->sob Reason for Exam: sob Acuity: Acute Type of Exam: Initial FINDINGS: Left subclavian dual lead pacer. Cardiomegaly. Pulmonary vasculature within normal limits. Left greater than right bibasilar opacities, predominantly strandy in morphology. Costophrenic angles sharp     1. Cardiomegaly with no evidence of pulmonary edema 2. Bibasilar opacities, atelectasis or pneumonia           PROCEDURES   Unless otherwise noted below, none     Procedures    CRITICAL CARE TIME   Total Critical Care time was 35 minutes, excluding separately reportable procedures. There was a high probability of clinically significant/life threatening deterioration in the patient's condition which required my urgent intervention.         CONSULTS:  IP CONSULT TO HOSPITALIST  IP CONSULT TO HOSPITALIST  IP CONSULT TO CARDIOLOGY  PHARMACY TO DOSE MEDICATION      EMERGENCY DEPARTMENT COURSE and DIFFERENTIAL DIAGNOSIS/MDM:   Vitals:    Vitals:    03/05/21 1618 03/05/21 1636 03/05/21 1656 03/05/21 1736   BP: 106/64 116/70 125/82 109/63   Pulse: 94 81 83 83   Resp: 20 20 26 17   Temp:       TempSrc:       SpO2: 98% 99%  100%   Weight:       Height:           Patient was given the following medications:  Medications   enoxaparin (LOVENOX) injection 70 mg (has no administration in time range)   atorvastatin (LIPITOR) tablet 40 mg (has no administration in time range)   metoprolol tartrate (LOPRESSOR) tablet 12.5 mg (has no administration in time range)   furosemide (LASIX) injection 40 mg (40 mg Intravenous Given 3/5/21 1622)   aspirin tablet 325 mg (325 mg Oral Given 3/5/21 1751)           Patient brought in today by private car for evaluation of shortness of breath with exertion. On exam patient is alert oriented afebrile breathing on room air satting at 100%. Nontoxic in appearance and in no acute respiratory distress. Old labs and records reviewed at this time. Patient was seen by myself as well as my attending, Dr. Pedro March. CBC reveals no acute leukocytosis. Hemoglobin of 12.3. No acute electrolyte abnormalities. Glucose of 301. BUN of 45 creatinine of 1. Initial troponin of 0.05. Repeat troponin of 0.16. EKG was obtained and reviewed by my attending see note for dictation. BNP of 9405. VBG unremarkable. Procalcitonin negative. Chest x-ray reveals cardiomegaly with no evidence of pulmonary edema. Bibasilar opacities, atelectasis or pneumonia. Given elevated troponin I did consult cardiology and spoke to Dr. Lilian Boyle with cardiology. He recommended treating patient for an NSTEMI. He recommended Lovenox and Lipitor as well as 12.5 of metoprolol and transfer to [facility] for further workup. Plan at this time will be to transfer to [facility] for cardiology evaluation and further work-up. . The hospitalist, Dr. Elizabeth Crockett with [facility] who did accept this transfer and admission. Patient was in stable condition. FINAL IMPRESSION      1. NSTEMI (non-ST elevated myocardial infarction) ([address]    2. Shortness of breath    3. Hyperglycemia          DISPOSITION/PLAN   DISPOSITION Decision To Transfer 03/05/2021 06:02:59 PM      PATIENT REFERREDTO:  No follow-up provider specified.     DISCHARGE MEDICATIONS:  New Prescriptions    No medications on file       DISCONTINUED MEDICATIONS:  Discontinued Medications    No medications on file              (Please note that portions of this note were completed with a voice recognition program.  Efforts were made to edit the dictations but occasionally words are mis-transcribed.)    Apryl Pacheco PA-C (electronically signed)            Apryl Pacheco PA-C  03/05/21 2309

## 2021-03-05 NOTE — CARE COORDINATION
[facility]      Patient is active with [facility]. I will follow for needs on DC.           Juan Herbert  Work mobile: [phone]  [facility] office: [phone]

## 2021-03-05 NOTE — ED NOTES
Patient has used the urinal multiple times, has had an output of 350 ml of yellow urine.      Rosana Chauhan RN  03/05/21 7926

## 2021-03-05 NOTE — CONSULTS
Low dose Heparin Infusion protocol:  Baseline aPTT is 31.4 sec. Give a 4,000 unit IV Bolus dose of heparin and begin the infusion at 900units/hr (9mL/hr). Recheck the aPTT 6hrs after starting protocol. Desired aPTT range is 49-76 seconds. 75kg used as dosing weight.   [employer] PharmD 3/5/2021 5:49 PM

## 2021-03-05 NOTE — ED NOTES
Heparin bolus and infusion was prepared, double tap was done. When medication was to be given. Aysha MILES came to bedside and states that she spoke with cardiology and the discontinued the medication, orders to be changed.      Rosana Chauhan RN  03/05/21 5267

## 2021-03-05 NOTE — ED NOTES
Patient ambulated in hallway with pulse oximeter. Patient had no difficulties.  Readings of 98% on room air and heart rate between 80's and [facility]  03/05/21 3513

## 2021-03-05 NOTE — TELEPHONE ENCOUNTER
GENI:    Patients son in law calling because speech therapist is with patient and told him to call his PCP. His vitals have been up and down this morning /108 pulse 154  O2 -79      Then it all went back to normal.    10 minutes later BP was 98/66     30 mins later B/P 102/76 pulse 105   O2 . 58    Currently BP is 102/76 Pulse78 . O2 71    He reports that patients fingernails are bluish, and hands cold. Because these numbers are all over the place,    I suggested he sends patient to the ER. Therapist agreed .

## 2021-03-06 NOTE — PROGRESS NOTES
Avasys placed in room for safety. IV wrapped to discourage pulling. Patient up to bathroom with assistance. Alarm on. Rails up x3 for safety.

## 2021-03-06 NOTE — PROGRESS NOTES
When pt escorted to bathroom, depends had a large amount of bright red blood. Urine noted to be clear w/ multiple small blood clots. Per pt, n/c of pain while urinating. Placed hat in toilet for future episodes - pt unable to follow directions to use a urinal d/t dementia. Will continue to monitor.

## 2021-03-06 NOTE — PROGRESS NOTES
Patient admitted to room 359 from [address] Report from [facility]. Daughter at bedside d/t pt hx dementia. Patient and daughter oriented to room, call light, bed rails, phone, lights and bathroom. Patient and daughter instructed about the schedule of the day including: vital sign frequency, lab draws, possible tests, frequency of MD and staff rounds, including RN/MD rounding together at bedside, daily weights, and I &O's. Patient and daughter instructed about prescribed diet, how to use 8MENU, and television. Bed alarm in place, patient aware of placement and reason. Bed locked, in lowest position, side rails up 2/4, call light within reach. Paged Bing Hash for orders and to make MD aware that daughter is bedside to give information, waiting on callback.

## 2021-03-06 NOTE — ED NOTES
Verbal report given to Ana Kearns RN at [facility], no questions at this time.      Caroline Otero RN  03/05/21 9229

## 2021-03-06 NOTE — DISCHARGE INSTR - COC
Continuity of Care Form    Patient Name: Danya Ribeiro   :    MRN:  [id number]    Admit date:  3/5/2021  Discharge date:  21      Code Status Order: Full Code   Advance Directives:   [facility] Documentation       Date/Time Healthcare Directive Type of Healthcare Directive Copy in [address] Agent's Name Healthcare Agent's Phone Number    21 0050  Yes, patient has an advance directive for healthcare treatment  Durable power of  for health care  Yes, copy in chart  Healthcare power of   Debora Burkitt X[employer]  [phone]            Admitting Physician:  George Brandon MD  PCP: [facility], [address] [place] [address]    Discharging Nurse: [facility] Unit/Room#: 2213/9149-84  Discharging Unit Phone Number:     Emergency Contact:   Extended Emergency Contact Information  Primary Emergency Contact: [address] of [address] [place] [address] Phone: [phone]  Mobile Phone: [phone]  Relation: Child  Secondary Emergency Contact: Kemal Self  Address: Abdoulaye Dominguez           [phone]   [address] Phone: [phone]  Mobile Phone: [phone]  Relation: Child    Past Surgical History:  Past Surgical History:   Procedure Laterality Date    COLONOSCOPY      EYE SURGERY      HERNIA REPAIR         Immunization History:   Immunization History   Administered Date(s) Administered    COVID-19, Peralta Peter, PF, 30mcg/0.3mL 2021, 2021    Influenza 2013    Influenza Virus Vaccine 2003, 2012    Influenza Whole 2009    Influenza, High Dose (Fluzone 65 yrs and older) 10/07/2014, 10/23/2015, 2016, 2017, 2018    Influenza, Quadv, adjuvanted, 65 yrs +, IM, PF (Fluad) 2020    Influenza, Triv, inactivated, subunit, adjuvanted, IM (Fluad 65 yrs and older) 2019    Pneumococcal Conjugate 13-valent ([id number]) 2015    Pneumococcal Polysaccharide ([id number]) 01/01/2005, 08/01/2012    Td (Adult), 5 Lf Tetanus Toxoid, Pf (Tenivac, Decavac) 06/01/2006    Td, unspecified formulation 06/01/2006    Zoster Live (Zostavax) 07/01/2010       Active Problems:  Patient Active Problem List   Diagnosis Code    Elevated PSA R97.20    LBBB (left bundle branch block) I44.7    Post PTCA Z98.61    Bruit R09.89    Pacemaker Z95.0    Mixed hyperlipidemia E78.2    Essential hypertension I10    Type 2 diabetes mellitus without complication, without long-term current use of insulin ([facility]) E11.9    Leukopenia D72.819    Cognitive impairment R41.89    MING (acute kidney injury) ([facility]) N17.9    NSTEMI (non-ST elevated myocardial infarction) ([facility]) I21.4    Coronary artery disease involving native coronary artery of native heart with angina pectoris ([facility]) I25.119    Ischemic cardiomyopathy I25.5    Dementia without behavioral disturbance ([facility]) F03.90    Severe protein-calorie malnutrition ([facility]) E43    Abnormal radiographic examination R93.89    Bradycardia R00.1    Chest pain R07.9    History of cardiac pacemaker in situ Z95.0    Osteoporosis M81.0    Parkinson's disease ([facility] [address] Gen Garcia       Isolation/Infection:   Isolation            No Isolation          Patient Infection Status       Infection Onset Added Last Indicated Last Indicated By Review Planned Expiration Resolved Resolved By    None active    Resolved    COVID-19 Rule Out 03/05/21 03/05/21 03/05/21 COVID-19, Rapid (Ordered)   03/05/21 Rule-Out Test Resulted            Nurse Assessment:  Last Vital Signs: /82   Pulse 68   Temp 98 °F (36.7 °C)   Resp 18   Ht 6' 2\" (1.88 m)   Wt 145 lb 8.1 oz (66 kg)   SpO2 96%   BMI 18.68 kg/m²     Last documented pain score (0-10 scale): Pain Level: 0  Last Weight:   Wt Readings from Last 1 Encounters:   03/05/21 145 lb 8.1 oz (66 kg)     Mental Status:  alert and Hx Dementia    IV Access:  - None    Nursing Mobility/ADLs:  Walking   Assisted Transfer  Assisted  Bathing  Assisted  Dressing  Assisted  Toileting  Assisted  Feeding  Independent  Med Admin  Assisted  Med Delivery   whole and one pill at a time    Wound Care Documentation and Therapy:        Elimination:  Continence:   · Bowel: Yes  · Bladder: Yes  Urinary Catheter: None   Colostomy/Ileostomy/Ileal Conduit: No       Date of Last BM: 3/5    Intake/Output Summary (Last 24 hours) at 3/6/2021 1151  Last data filed at 3/6/2021 0944  Gross per 24 hour   Intake 120 ml   Output 200 ml   Net -80 ml     I/O last 3 completed shifts: In: 120 [P.O.:120]  Out: -     Safety Concerns: At Risk for Falls    Impairments/Disabilities:      Cognitive    Nutrition Therapy:  Current Nutrition Therapy:   - Oral Diet:  Cardiac and Carb Control    Routes of Feeding: Oral  Liquids: No Restrictions  Daily Fluid Restriction: no  Last Modified Barium Swallow with Video (Video Swallowing Test): not done    Treatments at the Time of Hospital Discharge:   Respiratory Treatments: NA  Oxygen Therapy:  is not on home oxygen therapy.   Ventilator:    - No ventilator support    Rehab Therapies: Physical Therapy, Occupational Therapy and Speech/Language Therapy  Weight Bearing Status/Restrictions: No Restrictions  Other Medical Equipment (for information only, NOT a DME order):  walker and bedside commode  Other Treatments: NA    Patient's personal belongings (please select all that are sent with patient):  Glasses, Dentures upper and lower    RN SIGNATURE:  Electronically signed by Henry Larson RN on 3/7/21 at 4:45 PM EST    CASE MANAGEMENT/SOCIAL WORK SECTION    Inpatient Status Date: 3/5/21    Readmission Risk Assessment Score:  Readmission Risk              Risk of Unplanned Readmission:        18           Discharging to Facility/ Agency   · Name: [facility]  · Address:  · Phone: [phone]  · Fax:    Dialysis Facility (if applicable)   · Name:  · Address:  · Dialysis Schedule:  · Phone:  · Fax: / signature: Electronically signed by Pako Perez RN on 3/7/21 at 4:04 PM EST    PHYSICIAN SECTION    Prognosis: Fair    Condition at Discharge: Stable    Rehab Potential (if transferring to Rehab): Good    Recommended Labs or Other Treatments After Discharge: Per discharge instructions    Physician Certification: I certify the above information and transfer of Kenrick Segura  is necessary for the continuing treatment of the diagnosis listed and that he requires Home Care for less 30 days.      Update Admission H&P: No change in H&P    PHYSICIAN SIGNATURE:  Electronically signed by Barton Dandy, MD on 3/7/21 at 2:53 PM EST

## 2021-03-06 NOTE — ED NOTES
Verbal report given to [facility]. Patient stable upon leaving the ER by stretcher.      Baljit Anand RN  03/05/21 5639

## 2021-03-06 NOTE — CONSULTS
CARDIOLOGY CONSULTATION        Patient Name: Cammy Paredes  Date of admission: 3/5/2021 10:44 PM  Admission Dx: NSTEMI (non-ST elevated myocardial infarction) [facility]) [I21.4]  Requesting Physician: Carlos Panda MD  Primary Care physician: [facility], Hosptalist    Reason for Consultation/Chief Complaint: NSTEMI    History of Present Illness:     Cammy Paredes is a 80 y.o. patient with prior history notable for coronary disease status post prior PCI RCA and OM 2010, SSS status post PPM, hypertension, diabetes and dementia, who presented to the hospital 3/5/21 with complaints of chest pain. Direct transfer from [facility] overnight. Vitals on presentation 109/69, HR 81, afebrile, RR 20. Laboratories shows normal renal function, hgb 12.4, Pro-BNP 9405 (Inc from 3974 9/2020), troponin 0.0 5 > 0.16 > 0.22 prior to down-trend. EKG with NSR with known LBBB. Patient loaded with aspirin full dose, started on lovenox therapeutic dosing, and given lasix IV. This morning the patient is evaluated with his daughter at bedside. Notes no dyspnea or chest pain at this time. Daughter notes he's not had chest pain with any of his previous MI. He was feeling increased dyspnea prior to presentation with using walker to ambulate about home. Compliant with lasix at home. No orthopnea/PND/LE edema. Denies palpitations, dizziness, near-syncope or jef syncope. Of note, patient [facility]2020 with findings of severe LV dysfunction, elevated troponin to 1.2, with conservative management elected at that time. Last seen with Dr. Raymond Barker of [facility] in February and doing well with no changes made. Remains on DAPT, BB, statin. Seen with EP 2/22/21 with normal device function. ([employer] Ultr[employer] 112). Daughter today states making plans to place 'third wire' with device - sounds that plans for CRT forthcoming.      Past Medical History:   has a past medical history of Bruit, Dementia ([address] Elevated PSA, HBP (high blood pressure), Hyperlipidemia, IGT (impaired glucose tolerance), IH (inguinal hernia), LBBB (left bundle branch block), Murmur, NIDDY (non-insulin dependent diabetes mellitus in young) ([facility] [address] [facility] Pacer at end of battery life, Parkinson disease ([facility] [address] Post PTCA, and S/P lens implant. Surgical History:   has a past surgical history that includes Colonoscopy; hernia repair; and eye surgery. Social History:   reports that he quit smoking about 65 years ago. His smoking use included cigarettes, pipe, and cigars. He smoked 0.50 packs per day. He has never used smokeless tobacco. He reports previous alcohol use. He reports that he does not use drugs. Family History:  family history includes Alzheimer's Disease in his father; Cancer in his mother; Diabetes in his mother. Home Medications:  Were reviewed and are listed in nursing record and/or below  Prior to Admission medications    Medication Sig Start Date End Date Taking? Authorizing Provider   pravastatin (PRAVACHOL) 40 MG tablet Take 1 tablet by mouth once daily 1/20/21  Yes TEMITOPE Green CNP   lisinopril (PRINIVIL;ZESTRIL) 2.5 MG tablet Take 1 tablet by mouth once daily 1/20/21  Yes TEMITOPE Joshi - CNP   clopidogrel (PLAVIX) 75 MG tablet Take 1 tablet by mouth once daily 1/20/21  Yes TEMITOPE Joshi - CNP   potassium chloride (KLOR-CON) 20 MEQ packet Take 20 mEq by mouth daily 1/13/21  Yes TEMITOPE Green CNP   donepezil (ARICEPT) 10 MG tablet TAKE 1 TABLET BY MOUTH NIGHTLY FOR  MEMORY 12/21/20  Yes TEMITOPE Green - CNP   furosemide (LASIX) 20 MG tablet Take 20 mg by mouth daily 9/28/20  Yes Historical Provider, MD   metFORMIN (GLUCOPHAGE) 500 MG tablet TAKE 1 TABLET BY MOUTH TWICE DAILY WITH MEALS .   INCREASE  IN  4  WEEKS  TO  2  TABLETS  TWICE  DAILY  WITH  MEALS 7/27/20  Yes Sanju Arroyo MD   tamsulosin [facility]) 0.4 MG capsule Take 1 capsule by mouth once daily 7/1/20  Yes TEMITOPE Joshi CNP   aspirin 81 MG chewable tablet Take 1 tablet by mouth daily 5/1/20  Yes JD Tabares   metoprolol succinate (TOPROL XL) 25 MG extended release tablet Take 0.5 tablets by mouth daily 5/1/20  Yes JD Tabares   carbidopa-levodopa (SINEMET)  MG per tablet Take 1.5 tablets by mouth 3 times daily  5/31/17  Yes Historical Provider, MD   therapeutic multivitamin-minerals (THERAGRAN-M) tablet Take 1 tablet by mouth daily.    Yes Historical Provider, MD   potassium chloride (KLOR-CON M) 10 MEQ extended release tablet Take 10 mEq by mouth 2 times daily 9/28/20   Historical Provider, MD        CURRENT Medications:  heparin 25,000 unit in sodium chloride 0.45% 250 mL (premix) infusion, Continuous  heparin (porcine) injection 4,000 Units, PRN  heparin (porcine) injection 2,000 Units, PRN  aspirin chewable tablet 81 mg, Daily  carbidopa-levodopa (SINEMET)  MG per tablet 1.5 tablet, TID  clopidogrel (PLAVIX) tablet 75 mg, Daily  donepezil (ARICEPT) tablet 10 mg, Nightly  furosemide (LASIX) tablet 20 mg, Daily  lisinopril (PRINIVIL;ZESTRIL) tablet 2.5 mg, Daily  metoprolol succinate (TOPROL XL) extended release tablet 12.5 mg, Daily  pravastatin (PRAVACHOL) tablet 40 mg, Nightly  tamsulosin (FLOMAX) capsule 0.4 mg, Daily  sodium chloride flush 0.9 % injection 10 mL, 2 times per day  sodium chloride flush 0.9 % injection 10 mL, PRN  promethazine (PHENERGAN) tablet 12.5 mg, Q6H PRN  acetaminophen (TYLENOL) tablet 650 mg, Q6H PRN    Or  acetaminophen (TYLENOL) suppository 650 mg, Q6H PRN  polyethylene glycol (GLYCOLAX) packet 17 g, Daily PRN  perflutren lipid microspheres (DEFINITY) injection 1.65 mg, ONCE PRN  nitroGLYCERIN (NITROSTAT) SL tablet 0.4 mg, Q5 Min PRN  insulin lispro (HUMALOG) injection vial 0-18 Units, TID WC  insulin lispro (HUMALOG) injection vial 0-9 Units, Nightly  glucose (GLUTOSE) 40 % oral gel 15 g, PRN  dextrose 50 % IV solution, PRN  glucagon (rDNA) injection 1 mg, PRN  dextrose 5 % solution, PRN        Allergies:  Pcn [penicillins]     Review of Systems:   A 14 point review of symptoms completed. Pertinent positives identified in the HPI, all other review of symptoms negative as below. Objective:     Vitals:    03/05/21 2245 03/06/21 0500 03/06/21 0800   BP: 104/65 115/71 116/77   Pulse: 76 67 63   Resp: 22 16 18   Temp: 97.5 °F (36.4 °C) 98.1 °F (36.7 °C)    TempSrc: Oral Oral    SpO2:  98% 98%   Weight: 145 lb 8.1 oz (66 kg)     Height: 6' 2\" (1.88 m)        Weight: 145 lb 8.1 oz (66 kg)       PHYSICAL EXAM:    General:  Alert, cooperative, no distress, appears stated age   Head:  Normocephalic, atraumatic   Eyes:  Conjunctiva/corneas clear, anicteric sclerae    Nose: Nares normal, no drainage or sinus tenderness   Throat: No abnormalities of the lips, oral mucosa or tongue. Neck: Trachea midline. Neck supple with no lymphadenopathy, thyroid not enlarged, symmetric, no tenderness/mass/nodules, no significant Jugular venous pressure elevation    Lungs:   Diminished at R base, fine rales present, no wheezing, no distress   Chest Wall:  No deformity or tenderness to palpation   Heart:  Regular rate and rhythm, normal S1, normal S2, II/VI LIZZ LSB, no rub, no S3/S4, PMI non-displaced. No heave. Abdomen:   Soft, non-tender, with normoactive bowel sounds. No masses, no hepatosplenomegaly   Extremities: No cyanosis, clubbing or pitting edema. Vascular: 2+ radial,  femoral, dorsalis pedis and posterior tibial pulses bilaterally. Brisk carotid upstrokes without carotid bruit. Skin: Skin color, texture, turgor are normal with no rashes or ulceration. Pysch: Euthymic mood, appropriate affect   Neurologic: Oriented to person, place and time. No slurred speech or facial asymmetry. No motor or sensory deficits on gross examination.          Labs:   CBC:   Lab Results   Component Value Date    WBC 4.4 03/06/2021    RBC 4.04 03/06/2021    HGB 12.4 03/06/2021    HCT 37.3 03/06/2021    MCV 92.3 dilated. - Tricuspid valve: There was mild-moderate regurgitation.  - Inferior vena cava: Unable to be visualized due to patient body    habitus. 4/2020  Summary   Technically difficult examination due to patient inability to lay flat. LV systolic function is severely reduced with a visually estimated EF of   25-30 %. EF estimated by Ikm's method at 24 %. The left ventricle is normal in size with normal wall thickness. Severe global hypokinesis with more prominent inferior HK on certain views. Elevated left atrial pressures with a septal E/e' ratio of 16.2. Right ventricular systolic function appears moderately reduced. Pacemaker/ICD wire visualized in right-sided chambers. Systolic pulmonary artery pressure (SPAP) is normal and estimated at 29mmHg   (right atrial pressure 8mmHg). CXR  1. Cardiomegaly with no evidence of pulmonary edema   2. Bibasilar opacities, atelectasis or pneumonia         Impression and Plan:      1. NSTEMI  2. Ischemic Cardiomyopathy  3. Acute on chronic congestive heart failure  4. Known LBBB  5. SSS status post PPM  6. Hyperlipidemia  7. Hypertension  8. Parkinson's/Dementia  9.  CKD    Patient Active Problem List   Diagnosis    Elevated PSA    LBBB (left bundle branch block)    Post PTCA    Bruit    Pacemaker    Mixed hyperlipidemia    Essential hypertension    Type 2 diabetes mellitus without complication, without long-term current use of insulin (HCC)    Leukopenia    Cognitive impairment    MING (acute kidney injury) ([address]    NSTEMI (non-ST elevated myocardial infarction) ([address]    Coronary artery disease involving native coronary artery of native heart with angina pectoris ([address]    Ischemic cardiomyopathy    Dementia without behavioral disturbance (HCC)    Severe protein-calorie malnutrition (HCC)    Abnormal radiographic examination    Bradycardia    Chest pain    History of cardiac pacemaker in situ    Osteoporosis    Parkinson's disease ([facility] [address]       PLAN:  1. Continue conservative management for NSTEMI as was done in 4/2020. Continue heparin x 48 hrs (from first lovenox dose given OSH 3/5 ~18:00), aspirin/plavix, BB and statin at present dosing. 2. No need for repeat echo at this time as had outside study this past month  3. Recent device interrogation found normal function; monitor rhythms here by tele  4. Agree with transition to oral lasix - does not appear grossly volume overloaded at this point. Ensure 20 mg Oral lasix is threshold dose, if poor response, consider increase to 40mg oral daily. Monitor clinically for signs of ischemia. Treatment approach conservative. May be re-evaluated for CRT upgrade as planned on OP basis with [facility] cardiology following this admission, but at this time, given co-morbidities, feel it best to continue conservative treatment strategy. I will address the patient's cardiac risk factors and adjusted pharmacologic treatment as needed. In addition, I have reinforced the need for patient directed risk factor modification. All questions and concerns were addressed to the patient/family. Alternatives to my treatment were discussed. Thank you for allowing us to participate in the care of Kellie Cano. Please call me with any questions [phone].     Amalia De Leon MD, [address] [facility]  Cardiovascular Disease  [facility]  [phone] [facility]  [phone] [address] [place] [address]  3/6/2021 10:34 AM

## 2021-03-06 NOTE — PROGRESS NOTES
4 Eyes Skin Assessment     The patient is being assess for  Admission    I agree that 2 RN's have performed a thorough Head to Toe Skin Assessment on the patient. ALL assessment sites listed below have been assessed. Areas assessed by both nurses:   [x]   Head, Face, and Ears   [x]   Shoulders, Back, and Chest  [x]   Arms, Elbows, and Hands   [x]   Coccyx, Sacrum, and Ischum  [x]   Legs, Feet, and Heels        Does the Patient have Skin Breakdown?   No         Santhosh Prevention initiated:  Yes   Wound Care Orders initiated:  No      [facility] nurse consulted for Pressure Injury (Stage 3,4, Unstageable, DTI, NWPT, and Complex wounds):  No      Nurse 1 eSignature: Electronically signed by Holli Leon RN on 3/6/21 at 5:35 AM EST    **SHARE this note so that the co-signing nurse is able to place an eSignature**    Nurse 2 eSignature: Electronically signed by Jose De Los Santos RN on 3/6/21 at 6:24 PM EST

## 2021-03-06 NOTE — CONSULTS
Pharmacy to Manage Heparin Infusion per [facility] CLIN[facility]    Dx: NSTEMI  Pt wt = 66 kg  Baseline aPTT = ordered    Low Dose Heparin Infusion  Pt Tx from [facility]. Was Given Lovenox 70 mg x1 at 1800. Start Heparin drip 790 units/hr at 0600 on 3/6. Recheck aPTT in 6 hours. Goal aPTT = 49-76 seconds. Jason Rey Pharm D.3/6/2021 12:17 AM    Aptt = 66.1 sec at 1814. Continue heparin drip at 7.9ml/hr. Daily aptt ordered  Pia Story [facility]  3/6/2021 at 6:45 PM    Aptt = 80.7 @0632  Decrease rate to 6.6 ml/hr  Next aPTT @1400  [facility], R. Ph. 3/7/2021 7:31 AM      Aptt = 59.5 sec at 1433. Continue heparin drip at 6.6ml/hr.  Next aptt at 2030 today  Pia Story [facility]  3/7/2021 at 3:44 PM

## 2021-03-06 NOTE — PROGRESS NOTES
Hospitalist Progress Note      PCP: KAYLYN Hosptalist    Date of Admission: 3/5/2021    Chief Complaint: SOB    Hospital Course: H&P reviewed. Admitted from [facility] ER with NSTEMI    Subjective:    Patient with baseline dementia however denies any chest pain, palpitations. Daughter at bedside. She states that patient has history of CAD s/p PCI, pacemaker and follow with Dr Michelle Agee. Had echo a few days ago with his cardiologist.       Medications:  Reviewed    Infusion Medications    heparin (PORCINE) Infusion 790 Units/hr (03/06/21 0516)    dextrose       Scheduled Medications    aspirin  81 mg Oral Daily    carbidopa-levodopa  1.5 tablet Oral TID    clopidogrel  75 mg Oral Daily    donepezil  10 mg Oral Nightly    furosemide  20 mg Oral Daily    lisinopril  2.5 mg Oral Daily    metoprolol succinate  12.5 mg Oral Daily    pravastatin  40 mg Oral Nightly    tamsulosin  0.4 mg Oral Daily    sodium chloride flush  10 mL Intravenous 2 times per day    insulin lispro  0-18 Units Subcutaneous TID WC    insulin lispro  0-9 Units Subcutaneous Nightly     PRN Meds: heparin (porcine), heparin (porcine), sodium chloride flush, promethazine **OR** [DISCONTINUED] ondansetron, acetaminophen **OR** acetaminophen, polyethylene glycol, perflutren lipid microspheres, nitroGLYCERIN, glucose, dextrose, glucagon (rDNA), dextrose      Intake/Output Summary (Last 24 hours) at 3/6/2021 0901  Last data filed at 3/6/2021 0500  Gross per 24 hour   Intake 120 ml   Output    Net 120 ml       Physical Exam Performed:    /77   Pulse 63   Temp 98.1 °F (36.7 °C) (Oral)   Resp 18   Ht 6' 2\" (1.88 m)   Wt 145 lb 8.1 oz (66 kg)   SpO2 98%   BMI 18.68 kg/m²     General appearance: Pleasantly  demented elderly  male, no apparent distress, appears stated age and cooperative. HEENT: Pupils equal, round, Conjunctivae/corneas clear. Neck: Supple, with full range of motion. No jugular venous distention.  Trachea midline. Respiratory:  Normal respiratory effort. Clear to auscultation, bilaterally without Rales/Wheezes/Rhonchi. Cardiovascular: Regular rate and rhythm with normal S1/S2 without murmurs, rubs or gallops. Abdomen: Soft, non-tender, non-distended with normal bowel sounds. Musculoskeletal: No clubbing, cyanosis or edema bilaterally. Skin: Warm and dry  Neurologic: Notable for involuntary hand tremors from chronic parkinson's  Psychiatric: Alert and oriented X1 self only. no insight due to dementia    Labs:   Recent Labs     03/05/21  1500 03/06/21  0011 03/06/21  0535   WBC 5.6 5.1 4.4   HGB 12.3* 11.9* 12.4*   HCT 37.1* 36.4* 37.3*    182 176     Recent Labs     03/05/21  1500 03/06/21  0534    141   K 4.9 3.7    103   CO2 26 26   BUN 45* 37*   CREATININE 1.0 0.9   CALCIUM 9.2 9.2     Recent Labs     03/05/21  1500   AST 61*   ALT 37   BILITOT 0.4   ALKPHOS 113     Recent Labs     03/05/21  1500   INR 1.19*     Recent Labs     03/05/21  1655 03/06/21  0011 03/06/21  0253   TROPONINI 0.16* 0.22* 0.19*       Urinalysis:      Lab Results   Component Value Date    NITRU Negative 03/05/2021    WBCUA 0-2 03/05/2021    RBCUA >100 03/05/2021    BLOODU LARGE 03/05/2021    SPECGRAV 1.015 03/05/2021    GLUCOSEU 100 03/05/2021       Radiology:  No orders to display     Cardiac echo on 2/3/2021     Study Conclusions    - Left ventricle: The cavity size is normal. Wall thickness is    normal. Systolic function was severely reduced. The calculated    ejection fraction was 25%. Severe diffuse hypokinesis. Although    no diagnostic regional wall motion abnormality was identified,    this possibility cannot be completely excluded on the basis of    this study. There was a reduced contribution of atrial    contraction to ventricular filling, due to increased ventricular    diastolic pressure or atrial contractile dysfunction.  Doppler    parameters are consistent with a restrictive pattern, indicative    of decreased left ventricular diastolic compliance and increased    left atrial pressure (grade 3 diastolic dysfunction). The stroke    volume is 26ml. The stroke index is 14ml/m^2. The global    longitudinal strain was -6%. - Aortic valve: Thickening, consistent with sclerosis. - Mitral valve: There was mild regurgitation.  - Left atrium: The atrium is dilated. - Right ventricle: The cavity size is mildly dilated. De Media is normal. Pacer wire noted in right ventricle. - Right atrium: The atrium is dilated. - Tricuspid valve: There was mild-moderate regurgitation.  - Inferior vena cava: Unable to be visualized due to patient body    habitus. Assessment/Plan:    Active Hospital Problems    Diagnosis    NSTEMI (non-ST elevated myocardial infarction) ([address] [I21.4]    Coronary artery disease involving native coronary artery of native heart with angina pectoris ([address] [I25.119]    Type 2 diabetes mellitus without complication, without long-term current use of insulin ([address] [E11.9]    Essential hypertension [I10]     NSTEMI: trop was elevated, EKG non acute. Hx of CAD s/p PCI. Cardio consulted-appreciate help in advance. On heparin drip. Continue BB, ACE, aspirin, Plavix, statin    Cardiomyopathy : Elevated pro BNP,  9405, EF 25% on recent echo on 2/3/2021. No need to repeat echo at this time. Pt does not seem vol overloaded on exam with no pulm edema on CXR. Continue BB, ACEi     Hx of pacemaker     CXR with bibasilar ASD likely atelectasis. Low suspicion for pneumonia as procalcitonin normal-  0.08. Parkinson's disease: on sinemet         Essential HTN: Controlled. Continue meds.         HLD  -continue pravastatin     DM2: uncontrolled. HBA1c 7.9 on 9/28/2020. Metformin on hold. Continue SSI.       Dementia: Continue Aricept, supportive care    Hematuria: mild, noted by RN. Likely related to patient being on anticoagulation with heparin drip. Monitor for any worsening.  Monitor hgb DVT Prophylaxis: Heparin drip  Diet: Diet NPO, After Midnight  Code Status: Full Code    PT/OT Eval Status: Not indicated at this time    Dispo -pending cardio eval    Funmilayo Dave MD

## 2021-03-06 NOTE — CARE COORDINATION
CASE MANAGEMENT INITIAL ASSESSMENT      Reviewed chart and completed assessment via telephone with: pt anna Jay  Explained Case Management role/services. Primary contact information: anna and POA Misty    Health Care Decision Maker :    anna and SHIVA Jay    Can this person be reached and be able to respond quickly, such as within a few minutes or hours? Yes  Who would be your back-up decision maker? Name dtr and secondary [address]  Phone Number: [phone]    Admit date/status: 3/5/21  Diagnosis: NSTEMI  Is this a Readmission?:  No      Insurance:[facility] Medicare   Precert required for SNF: Yes       3 night stay required: No    Living arrangements, Adls, care needs, prior to admission: Lives with  anna Misty in home with 2 stories. Pt main living on first floor. Uses walker/cane. Needs assist with ADLS, has dementia    Transportation: family to transport to home    Durable Medical Equipment at home:  Walker_x_Cane_x_RTS__ BSC__Shower Chair_x_  02__ HHN__ CPAP__  BiPap__  Hospital Bed__ W/C_transport chair__ Other__________    Services in the home and/or outpatient, prior to admission: [address] PT and speech, caretaker private pay helps with ADLs, housekeeping 3 x/week 8-2 pm. Family drives to \[facility] [place] [facility]\""    Dialysis Facility (if applicable) N/A    PT/OT recs: none available    Hospital Exemption Notification (HEN): not initiated    Barriers to discharge: none    Plan/comments:  Pt has never been to SNF. Per Anna Jay, best plan for pt is to return [facility] [address] [facility] caregiver services. DCP following for needs.      ECOC on chart for MD signature

## 2021-03-06 NOTE — H&P
Hospital Medicine History & Physical      PCP: Marcus PATIÑOtaljamil    Date of Admission: 3/5/2021    Date of Service: Pt seen/examined on 3/5/2021and Admitted to Inpatient with expected LOS greater than two midnights due to medical therapy. Chief Complaint:  Shortness of breath    History Of Present Illness:      80 y.o. male, with PMH of HTN, CAD, HLD, DM and dementia, who was a direct admit from [facility] to [facility] with an NSTEMI. The patient is a poor historian d/t dementia, history obtained from the patients daughter at bedside and the review of EMR. The patient's daughter reported that the patient has [facility] that comes in. She stated today while doing vital signs the home health nurse said they were \"off\". The patient's daughter called the patient's PCP and was referred to the emergency room. Patient's daughter stated the patient has also been experiencing shortness of breath for the last 2 days. In the emergency room at [facility] the patient was found to have an elevated troponin of 0.05. His repeat troponin was 0.16. The patient was given a treatment dose of Lovenox as well as a atorvastatin, metoprolol, aspirin and Lasix. Urology was consulted in the emergency room. Dr. Hamzah Garcia recommended that the patient be treated for an NSTEMI and transferred to [facility] for further evaluation and management. The patient currently denies any chest pain and/or shortness of breath. He denied any other associated symptoms as well as any aggravating and/or alleviating factors. At the time of this assessment, the patient was resting comfortably in bed. He currently denies any chest pain, back pain, abdominal pain, shortness of breath, numbness, tingling, N/V/C/D, fever and/or chills.      Past Medical History:          Diagnosis Date    Bruit     Dementia ([address]     Elevated PSA     HBP (high blood pressure)     Hyperlipidemia     IGT (impaired glucose tolerance)     IH (inguinal hernia)     LBBB (left bundle branch block)     Murmur     NIDDY (non-insulin dependent diabetes mellitus in young) [facility]     Pacer at end of battery life     Parkinson disease [facility]     Post PTCA     S/P lens implant      Past Surgical History:          Procedure Laterality Date    COLONOSCOPY      EYE SURGERY      HERNIA REPAIR       Medications Prior to Admission:      Prior to Admission medications    Medication Sig Start Date End Date Taking? Authorizing Provider   pravastatin (PRAVACHOL) 40 MG tablet Take 1 tablet by mouth once daily 1/20/21   TEMITOPE Caraballo CNP   lisinopril (PRINIVIL;ZESTRIL) 2.5 MG tablet Take 1 tablet by mouth once daily 1/20/21   TEMITOPE Caraballo CNP   clopidogrel (PLAVIX) 75 MG tablet Take 1 tablet by mouth once daily 1/20/21   TEMITOPE Caraballo CNP   potassium chloride (KLOR-CON) 20 MEQ packet Take 20 mEq by mouth daily 1/13/21   TEMITOPE Caraballo CNP   donepezil (ARICEPT) 10 MG tablet TAKE 1 TABLET BY MOUTH NIGHTLY FOR  MEMORY 12/21/20   TEMITOPE Caraballo CNP   furosemide (LASIX) 20 MG tablet Take 20 mg by mouth daily 9/28/20   Historical Provider, MD   potassium chloride (KLOR-CON M) 10 MEQ extended release tablet Take 10 mEq by mouth 2 times daily 9/28/20   Historical Provider, MD   metFORMIN (GLUCOPHAGE) 500 MG tablet TAKE 1 TABLET BY MOUTH TWICE DAILY WITH MEALS .   INCREASE  IN  4  WEEKS  TO  2  TABLETS  TWICE  DAILY  WITH  MEALS 7/27/20   Jitendra Zendejas MD   tamsulosin [facility]) 0.4 MG capsule Take 1 capsule by mouth once daily 7/1/20   TEMITOPE Caraballo CNP   aspirin 81 MG chewable tablet Take 1 tablet by mouth daily 5/1/20   JD Larry   metoprolol succinate (TOPROL XL) 25 MG extended release tablet Take 0.5 tablets by mouth daily 5/1/20   JD Larry   carbidopa-levodopa (SINEMET)  MG per tablet Take 1.5 tablets by mouth 3 times daily  5/31/17   Historical Provider, MD   therapeutic multivitamin-minerals [facility]) tablet Take 1 tablet by mouth daily. Historical Provider, MD     Allergies:  Pcn [penicillins]    Social History:      The patient currently lives at home    TOBACCO:   reports that he quit smoking about 65 years ago. His smoking use included cigarettes, pipe, and cigars. He smoked 0.50 packs per day. He has never used smokeless tobacco.  ETOH:   reports previous alcohol use. E-Cigarettes/Vaping Use     Questions Responses    E-Cigarette/Vaping Use     Start Date     Passive Exposure     Quit Date     Counseling Given     Comments         Family History:      Reviewed in detail      Problem Relation Age of Onset    Cancer Mother     Diabetes Mother     Alzheimer's Disease Father      REVIEW OF SYSTEMS:   Pertinent positives as noted in the HPI. All other systems reviewed and negative. PHYSICAL EXAM PERFORMED:    /65   Pulse 76   Temp 97.5 °F (36.4 °C) (Oral)   Resp 22   Ht 6' 2\" (1.88 m)   Wt 145 lb 8.1 oz (66 kg)   BMI 18.68 kg/m²     General appearance:  Pleasant male in no apparent distress, appears stated age and cooperative. HEENT:  Pupils equal, round, and reactive to light. Extra ocular muscles intact. Conjunctivae/corneas clear. Neck: Supple, with full range of motion. No jugular venous distention. Trachea midline. Respiratory:  Normal respiratory effort. Clear to auscultation, bilaterally without Rales/Wheezes/Rhonchi. Cardiovascular:  Regular rate and rhythm with normal S1/S2 without murmurs, rubs or gallops. Abdomen: Soft, non-tender, non-distended with normal bowel sounds. Musculoskeletal:  No clubbing, cyanosis or edema bilaterally. Full range of motion without deformity. Skin: Skin color, texture, turgor normal.  No significant rashes or lesions. Neurologic:  Neurovascularly intact .  Cranial nerves: II-XII intact, grossly non-focal.  Psychiatric:  Alert and oriented to self only, thought content appropriate, normal insight  Capillary Refill: Brisk,< 3 seconds Peripheral Pulses: +2 palpable, equal bilaterally     Labs:     Recent Labs     03/05/21  1500   WBC 5.6   HGB 12.3*   HCT 37.1*        Recent Labs     03/05/21  1500      K 4.9      CO2 26   BUN 45*   CREATININE 1.0   CALCIUM 9.2     Recent Labs     03/05/21  1500   AST 61*   ALT 37   BILITOT 0.4   ALKPHOS 113     Recent Labs     03/05/21  1500   INR 1.19*     Recent Labs     03/05/21  1500 03/05/21  1655   TROPONINI 0.05* 0.16*     Urinalysis:      Lab Results   Component Value Date    NITRU Negative 03/05/2021    WBCUA 0-2 03/05/2021    RBCUA >100 03/05/2021    BLOODU LARGE 03/05/2021    SPECGRAV 1.015 03/05/2021    GLUCOSEU 100 03/05/2021     Radiology:     CXR: I have reviewed the CXR with the following interpretation: cardiomegaly with no evidence of pulmonary edema. Bibasilar opacities, atelectasis or pneumonia    EKG:  I have reviewed the EKG with the following interpretation: Normal sinus rhythmLeft atrial enlargement. Left bundle branch block. Abnormal ECG. When compared with ECG of 26-APR-2020 22:14, Premature atrial complexes are no longer Present. Questionable change in QRS axisT wave inversion more evident in Lateral leads.   Confirmed by Singh Schmitt MD, [address] ([id number]) on 3/5/2021 5:37:56 PM    No orders to display     ASSESSMENT:    Active Hospital Problems    Diagnosis Date Noted    NSTEMI (non-ST elevated myocardial infarction) [facility] [I21.4]     Coronary artery disease involving native coronary artery of native heart with angina pectoris ([facility] [I25.119]     Type 2 diabetes mellitus without complication, without long-term current use of insulin ([facility] [E11.9] 04/25/2017    Essential hypertension [I10] 10/23/2015     PLAN:    NSTEMI in patient with c/o shortness of breath  -atypical  -serial troponin - initial elevated 0.05, repeat 0.16  -EKG w/o ischemic changes  -FLP in am  -NPO after MN  -heparin gtt initiated at [facility] on 3/5/2021  -echo in am  -cardiology consulted - appreciate recommendations in advance  -prn nitro  -tele monitoring    Essential HTN in setting of known CAD  -continue lisinopril and metoprolol  -continue asa and plavix    HLD  -continue pravastatin    DM2, uncontrolled  -  -hemglobin a1c 7.9 on 9/28/2020  -hold home metformin  -hdssi  -poct ac/hs  -hypoglycemia protocol  -carb control diet    Dementia  -supportive care  -continue aricept    Normocytic anemia, 12.3/37.1 on admission  -no s/s of bleeding at this time  -cbc in am    DVT Prophylaxis: heparin gtt    Diet: No diet orders on file     Code Status: Prior    PT/OT Eval Status: no indication for need at this time    Dispo - 2-3 days pending clinical improvement     Stevan Cortez, APRN - CNP    Thank you A, Hosptalist for the opportunity to be involved in this patient's care.  If you have any questions or concerns please feel free to contact me at [phone].  ------------------------------Dr. Jihan Laboy--------------------------------------

## 2021-03-07 NOTE — PROGRESS NOTES
[address]  Cardiology  Progress Note    Admission date:  3/5/2021    Reason for follow up visit: Elevated troponin    HPI/CC: Pedro March is a 80 y.o. male who was admitted 3/5/2021 for chest pain. Troponin elevated. Conservative management due to dementia, advanced age. Rhythm has been SR. Subjective: Denies chest pain, shortness of breath, palpitations and dizziness. Vitals:  Blood pressure (!) 110/58, pulse 68, temperature 97.7 °F (36.5 °C), temperature source Oral, resp. rate 18, height 6' 2\" (1.88 m), weight 138 lb 6.4 oz (62.8 kg), SpO2 97 %.   Temp  Av.9 °F (36.6 °C)  Min: 97.6 °F (36.4 °C)  Max: 98.1 °F (36.7 °C)  Pulse  Av.3  Min: 63  Max: 72  BP  Min: 95/57  Max: 116/77  SpO2  Av.3 %  Min: 95 %  Max: 98 %    24 hour I/O    Intake/Output Summary (Last 24 hours) at 3/7/2021 6455  Last data filed at 3/7/2021 0015  Gross per 24 hour   Intake 370 ml   Output 500 ml   Net -130 ml     Current Facility-Administered Medications   Medication Dose Route Frequency Provider Last Rate Last Admin    heparin 25,000 unit in sodium chloride 0.45% 250 mL (premix) infusion  790 Units/hr Intravenous Continuous Lisabeth Balsam, APRN - CNP 7.9 mL/hr at 21 0342 790 Units/hr at 21 0342    heparin (porcine) injection 4,000 Units  4,000 Units Intravenous PRN Lisabeth Balsam, APRN - CNP        heparin (porcine) injection 2,000 Units  2,000 Units Intravenous PRN Lisabeth Balsam, APRN - CNP        aspirin chewable tablet 81 mg  81 mg Oral Daily Lisabeth Balsam, APRN - CNP   81 mg at 21 1141    carbidopa-levodopa (SINEMET)  MG per tablet 1.5 tablet  1.5 tablet Oral TID Lisabeth Balsam, APRN - CNP   1.5 tablet at 21 205    clopidogrel (PLAVIX) tablet 75 mg  75 mg Oral Daily Lisabeth Brysonm, APRN - CNP   75 mg at 21 1103    donepezil (ARICEPT) tablet 10 mg  10 mg Oral Nightly Lisabeth Balsam, APRN - CNP   10 mg at 21    furosemide (LASIX) tablet 20 mg  20 mg Oral Daily Tanvi Peach, APRN - CNP   20 mg at 03/06/21 1141    lisinopril (PRINIVIL;ZESTRIL) tablet 2.5 mg  2.5 mg Oral Daily Tanvi Peach, APRN - CNP   2.5 mg at 03/06/21 1141    metoprolol succinate (TOPROL XL) extended release tablet 12.5 mg  12.5 mg Oral Daily Tanvi Peach, APRN - CNP   12.5 mg at 03/06/21 1141    pravastatin (PRAVACHOL) tablet 40 mg  40 mg Oral Nightly Tanvi Peach, APRN - CNP   40 mg at 03/06/21 2050    tamsulosin (FLOMAX) capsule 0.4 mg  0.4 mg Oral Daily Tanvi Peach, APRN - CNP   0.4 mg at 03/06/21 1151    sodium chloride flush 0.9 % injection 10 mL  10 mL Intravenous 2 times per day Tanvi Peach, APRN - CNP   10 mL at 03/06/21 2051    sodium chloride flush 0.9 % injection 10 mL  10 mL Intravenous PRN Tanvi Peach, APRN - CNP        promethazine (PHENERGAN) tablet 12.5 mg  12.5 mg Oral Q6H PRN Tanvi Peach, APRN - CNP        acetaminophen (TYLENOL) tablet 650 mg  650 mg Oral Q6H PRN Tanvi Peach, APRN - CNP        Or    acetaminophen (TYLENOL) suppository 650 mg  650 mg Rectal Q6H PRN Tanvi Peach, APRN - CNP        polyethylene glycol (GLYCOLAX) packet 17 g  17 g Oral Daily PRN Tanvi Peach, APRN - CNP        perflutren lipid microspheres (DEFINITY) injection 1.65 mg  1.5 mL Intravenous ONCE PRN Tanvi Peach, APRN - CNP        nitroGLYCERIN (NITROSTAT) SL tablet 0.4 mg  0.4 mg Sublingual Q5 Min PRN Tanvi Peach, APRN - CNP        insulin lispro (HUMALOG) injection vial 0-18 Units  0-18 Units Subcutaneous TID WC Tanvi Peach, APRN - CNP        insulin lispro (HUMALOG) injection vial 0-9 Units  0-9 Units Subcutaneous Nightly Tanvi Peach, APRN - CNP        glucose (GLUTOSE) 40 % oral gel 15 g  15 g Oral PRN Tanvi Peach, APRN - CNP        dextrose 50 % IV solution  12.5 g Intravenous PRN Tanvi Peach, APRN - CNP        glucagon (rDNA) injection 1 mg  1 mg Intramuscular PRN Tanvi Peach, APRN - CNP        dextrose 5 % solution  100 mL/hr Intravenous PRN Tanvi Peach, APRN - CNP Review of Systems   Unable to perform ROS: Dementia     Objective:     Telemetry monitor: SR    Physical Exam:  Constitutional:  Comfortable and alert, NAD, appears stated age  Eyes: PERRL, sclera nonicteric  Neck:  Supple, no masses, no thyroidmegaly, no JVD  Skin:  Warm and dry; no rash or lesions  Heart: Regular, normal apex, S1 and S2 normal, no M/G/R  Lungs:  Normal respiratory effort; clear; no wheezing/rhonchi/rales  Abdomen: soft, non tender, + bowel sounds  Extremities:  No edema or cyanosis; no clubbing  Neuro: alert and oriented, moves legs and arms equally, normal mood and affect    Data Reviewed:    Echo 2/3/2021:  Left ventricle: The cavity size is normal. Wall thickness is    normal. Systolic function was severely reduced. The calculated    ejection fraction was 25%. Severe diffuse hypokinesis. Although    no diagnostic regional wall motion abnormality was identified,    this possibility cannot be completely excluded on the basis of    this study. There was a reduced contribution of atrial    contraction to ventricular filling, due to increased ventricular    diastolic pressure or atrial contractile dysfunction. Doppler    parameters are consistent with a restrictive pattern, indicative    of decreased left ventricular diastolic compliance and increased    left atrial pressure (grade 3 diastolic dysfunction). The stroke    volume is 26ml. The stroke index is 14ml/m^2. The global    longitudinal strain was -6%. - Aortic valve: Thickening, consistent with sclerosis. - Mitral valve: There was mild regurgitation.  - Left atrium: The atrium is dilated. - Right ventricle: The cavity size is mildly dilated. [place] Light is normal. Pacer wire noted in right ventricle. - Right atrium: The atrium is dilated. - Tricuspid valve: There was mild-moderate regurgitation.  - Inferior vena cava: Unable to be visualized due to patient body    habitus.     Lab Reviewed:     Renal Profile:  Lab Results Component Value Date    CREATININE 0.9 03/06/2021    BUN 37 03/06/2021     03/06/2021    K 3.7 03/06/2021     03/06/2021    CO2 26 03/06/2021     CBC:    Lab Results   Component Value Date    WBC 4.4 03/06/2021    RBC 4.04 03/06/2021    HGB 12.4 03/06/2021    HCT 37.3 03/06/2021    MCV 92.3 03/06/2021    RDW 14.0 03/06/2021     03/06/2021     BNP:    Lab Results   Component Value Date    PROBNP 9,405 03/05/2021    PROBNP 3,974 09/28/2020    PROBNP 1,648 04/26/2020     Fasting Lipid Panel:    Lab Results   Component Value Date    CHOL 137 03/06/2021    HDL 51 03/06/2021    TRIG 106 03/06/2021     Cardiac Enzymes:  CK/MbTroponin  Lab Results   Component Value Date    CKTOTAL 331 04/27/2020    TROPONINI 0.19 03/06/2021     PT/ INR   Lab Results   Component Value Date    INR 1.19 03/05/2021    INR 0.97 04/26/2020    INR 1.10 11/18/2010    INR 1.11 10/07/2010    INR 1.08 09/30/2010    PROTIME 13.8 03/05/2021    PROTIME 11.3 04/26/2020    PROTIME 12.5 11/18/2010     PTT No results found for: PTT No results found for: MG   Lab Results   Component Value Date    TSH 1.12 05/31/2017     All labs and imaging reviewed today    Assessment:  Elevated troponin  Ischemic cardiomyopathy: known history, EF 25% on echo 2/2021  Acute on chronic combined CHF  CAD: s/p PCI RCA and OM 2010  Sick sinus syndrome s/p ppm  LBBB  HTN  DM  CKD  Dementia    Plan:   1. Medical management for elevated troponin, known cardiomyopathy/CAD, CHF, due to advanced age/dementia  2. Continue heparin until 1800 today to complete 48 hour therapy  3. Continue aspirin, plavix, po lasix, statin, lisinopril, toprol  4. Cardiology will sign off. He will follow up with primary cardiologist at [facility] upon discharge.     Ethel Olivia, APRN-CNP  [address]  [phone]

## 2021-03-07 NOTE — TELEPHONE ENCOUNTER
Noted. Annabelle Trinidad, please f/u with dtr, Misty or son in law to see if they need anything from us. Appears pt will be going back home once discharged.

## 2021-03-07 NOTE — CARE COORDINATION
CASE MANAGEMENT DISCHARGE SUMMARY      Discharge to: HOme with Kiya Burgos    Transportation:    Family/car: yes     Confirmed discharge plan with:     Patient: yes per RN     Family, name and contact number: anna Jay [phone] or [phone]   Facility/Agency, name:  [address] [place] [address] faxed      RN, name: Savanna Pandya RN    Call anna Jay for transport home when gtt complete. Note: Discharging nurse to complete JESSICA, reconcile AVS, and place final copy with patient's discharge packet. RN to ensure that written prescriptions for  Level II medications are sent with patient to the facility as per protocol.

## 2021-03-08 NOTE — CARE COORDINATION
[address] Transitions Initial Follow Up Call    Call within 2 business days of discharge: Yes    Patient: Bridgett Nava Patient : 1935   MRN: [id number]  Reason for Admission: NSTEMI   Discharge Date: 3/7/21 RARS: Readmission Risk Score: 18      Last Discharge [facility]       Complaint Diagnosis Description Type Department Provider    3/5/21   Admission (Discharged) KIRSTEN Colindres MD    3/5/21 Shortness of Breath NSTEMI (non-ST elevated myocardial infarction) ([address] . .. ED (TRANSFER) [facility] ED Melita Goodell, DO           Attempted to reach patient via phone for initial post hospital transition call.   Mailbox full and no option to leave message        Care Transitions 24 Hour Call    Do you have all of your prescriptions and are they filled?: Yes (Comment: patient denies any difficulty managing medicaitons)  Patient DME: Straight cane, Walker  Do you have support at home?: Alone  Are you an active caregiver in your home?: No  Care Transitions Interventions         Follow Up  Future Appointments   Date Time Provider Nicole Rosado   3/10/2021  1:45 PM TEMITOPE Tabares CNP [facility]   3/16/2021  1:00 PM TEMITOPE Bello CNP [id number] [facility] Chris Bright RN

## 2021-03-08 NOTE — TELEPHONE ENCOUNTER
[phone]  June is the caregiver. We spoke for a few moments. She gave me Misty's number. Was getting ready to give Ed his bath. She feels he is in good spirits. [phone] Misty , nothing needed at this time. Was told to schedule a follow up , will need to speak with Elidia Recinos   [phone] Elidia Recinos. Spoke with Geraldean Bamberger , would like to have him see Nicky Solis this week. Scheduled for Wednesday at 1:45    He was driving and asked me to call back later to check on that time.          Patient does have a pending appointment with Priti on 3-

## 2021-03-09 NOTE — CARE COORDINATION
[address] Transitions Initial Follow Up Call    Call within 2 business days of discharge: Yes    Patient: Elham Serra Patient : 1935   MRN: [id number]  Reason for Admission: NSTEMI  Discharge Date: 3/7/21 RARS: Readmission Risk Score: 18      Last Discharge [facility]       Complaint Diagnosis Description Type Department Provider    3/5/21   Admission (Discharged) KIRSTEN Shelley MD    3/5/21 Shortness of Breath NSTEMI (non-ST elevated myocardial infarction) ([address] . .. ED (TRANSFER) [facility] ED Ole Line, DO           Second attempt made to reach patient for initial post hospital transition call. Mailbox was full and not accepting any new messages. Episode closed due to unsuccessful contact x2 attempts.           Care Transitions 24 Hour Call    Do you have all of your prescriptions and are they filled?: Yes (Comment: patient denies any difficulty managing medicaitons)  Patient DME: Straight cane, Walker  Do you have support at home?: Alone  Are you an active caregiver in your home?: No  Care Transitions Interventions         Follow Up  Future Appointments   Date Time Provider Nicole Rosado   3/10/2021  1:45 PM Diana Marcial, TEMITOPE - CNP [id number] [facility] Geovanni Hollis RN

## 2021-03-11 NOTE — PROGRESS NOTES
Post-Discharge Transitional Care Management Services or Hospital Follow Up      Maddie Zamora   YOB: 1935    Date of Office Visit:  3/11/2021  Date of Hospital Admission: 3/5/21  Date of Hospital Discharge: 3/7/21  Readmission Risk Score(high >=14%.  Medium >=10%):Readmission Risk Score: 18      Care management risk score Rising risk (score 2-5) and Complex Care (Scores >=6): 6     Non face to face  following discharge, date last encounter closed (first attempt may have been earlier): 3/9/2021  9:41 AM 3/9/2021  9:41 AM    Call initiated 2 business days of discharge: Yes     Patient Active Problem List   Diagnosis    Elevated PSA    LBBB (left bundle branch block)    Post PTCA    Bruit    Pacemaker    Mixed hyperlipidemia    Essential hypertension    Type 2 diabetes mellitus without complication, without long-term current use of insulin (HCC)    Leukopenia    Cognitive impairment    MING (acute kidney injury) ([address]    NSTEMI (non-ST elevated myocardial infarction) ([address]    Coronary artery disease involving native coronary artery of native heart with angina pectoris ([address]    Ischemic cardiomyopathy    Dementia without behavioral disturbance ([address]    Severe protein-calorie malnutrition ([address]    Abnormal radiographic examination    Bradycardia    Chest pain    History of cardiac pacemaker in situ    Osteoporosis    Parkinson's disease ([address]       Allergies   Allergen Reactions    Pcn [Penicillins]      rash       Medications listed as ordered at the time of discharge from [facility] Medication Instructions BUTCH:    Printed on:03/11/21 2163   Medication Information                      aspirin 81 MG chewable tablet  Take 1 tablet by mouth daily             carbidopa-levodopa (SINEMET)  MG per tablet  Take 1.5 tablets by mouth 3 times daily              clopidogrel (PLAVIX) 75 MG tablet  Take 1 tablet by mouth once daily             donepezil (ARICEPT) 10 MG tablet  TAKE 1 TABLET BY MOUTH NIGHTLY FOR  MEMORY             furosemide (LASIX) 20 MG tablet  Take 20 mg by mouth daily             lisinopril (PRINIVIL;ZESTRIL) 2.5 MG tablet  Take 1 tablet by mouth once daily             metFORMIN (GLUCOPHAGE) 500 MG tablet  TAKE 1 TABLET BY MOUTH TWICE DAILY WITH MEALS . INCREASE  IN  4  WEEKS  TO  2  TABLETS  TWICE  DAILY  WITH  MEALS             metoprolol succinate (TOPROL XL) 25 MG extended release tablet  Take 0.5 tablets by mouth daily             potassium chloride (KLOR-CON) 20 MEQ packet  Take 20 mEq by mouth daily             pravastatin (PRAVACHOL) 40 MG tablet  Take 1 tablet by mouth once daily             tamsulosin (FLOMAX) 0.4 MG capsule  Take 1 capsule by mouth once daily             therapeutic multivitamin-minerals (THERAGRAN-M) tablet  Take 1 tablet by mouth daily. Medications marked \"taking\" at this time  Outpatient Medications Marked as Taking for the 3/11/21 encounter (Virtual Visit) with TEMITOPE Herrmann CNP   Medication Sig Dispense Refill    pravastatin (PRAVACHOL) 40 MG tablet Take 1 tablet by mouth once daily 90 tablet 1    lisinopril (PRINIVIL;ZESTRIL) 2.5 MG tablet Take 1 tablet by mouth once daily 90 tablet 1    clopidogrel (PLAVIX) 75 MG tablet Take 1 tablet by mouth once daily 90 tablet 1    potassium chloride (KLOR-CON) 20 MEQ packet Take 20 mEq by mouth daily 30 each 3    donepezil (ARICEPT) 10 MG tablet TAKE 1 TABLET BY MOUTH NIGHTLY FOR  MEMORY 90 tablet 0    furosemide (LASIX) 20 MG tablet Take 20 mg by mouth daily      metFORMIN (GLUCOPHAGE) 500 MG tablet TAKE 1 TABLET BY MOUTH TWICE DAILY WITH MEALS .   INCREASE  IN  4  WEEKS  TO  2  TABLETS  TWICE  DAILY  WITH  MEALS 360 tablet 0    tamsulosin (FLOMAX) 0.4 MG capsule Take 1 capsule by mouth once daily 90 capsule 3    aspirin 81 MG chewable tablet Take 1 tablet by mouth daily 30 tablet 0    metoprolol succinate (TOPROL XL) 25 MG extended release tablet Take 0.5 tablets by mouth daily 30 tablet 3    carbidopa-levodopa (SINEMET)  MG per tablet Take 1.5 tablets by mouth 3 times daily       therapeutic multivitamin-minerals (THERAGRAN-M) tablet Take 1 tablet by mouth daily. Medications patient taking as of now reconciled against medications ordered at time of hospital discharge: Yes    Chief Complaint   Patient presents with    Follow-Up from Hospital       HPI  Pt in hospital from 3/5-3/7 for NSTEMI. He has underlying CAD with pacer, DM, HTN, Dementia and Parkinsons. Inpatient course: Discharge summary reviewed- see chart. Reviewed hospital treatment and answered all questions. Interval history/Current status: Per DAYRON Recinos patient is back to baseline. He is reluctant to get up and move. PT is coming 3/week. Speech therapy weekly and OT is to begin visits. Family is using protein shakes and Boost to help maintain adequate nutrition. Pts appetite is good of a morning but seems to wane as the day progresses. He is satisfied with only small portions of food. Pt and family deny any additional needs at this time. There is an appointment for pacer check with Dr. Sherrill Morales on March 22nd and one with Dr. Bhupendra Mcleod on March 31. Review of Systems   Constitutional: Negative for activity change and fever. HENT: Negative for congestion. Eyes: Negative for discharge. Respiratory: Positive for shortness of breath. Negative for cough and wheezing. DAYRON describes some SOB with exertion but states it seems improved since hospital stay. Cardiovascular: Negative for chest pain, palpitations and leg swelling. Gastrointestinal: Negative for abdominal pain, blood in stool, constipation, diarrhea, nausea and vomiting. Genitourinary: Negative for dysuria and hematuria. Musculoskeletal: Negative for myalgias. Uses walker   Skin: Negative. Negative for rash. Neurological: Positive for tremors and weakness.  Negative for dizziness. Psychiatric/Behavioral: The patient is not nervous/anxious. There were no vitals filed for this visit. There is no height or weight on file to calculate BMI. Wt Readings from Last 3 Encounters:   03/07/21 138 lb 6.4 oz (62.8 kg)   03/05/21 165 lb (74.8 kg)   09/28/20 152 lb (68.9 kg)     BP Readings from Last 3 Encounters:   03/07/21 104/64   03/05/21 104/64   04/30/20 (!) 112/59       Physical Exam  Constitutional:       Comments: Weak appearing lying in bed   HENT:      Head: Normocephalic. Eyes:      General: No scleral icterus. Right eye: No discharge. Left eye: No discharge. Pulmonary:      Breath sounds: No wheezing. Psychiatric:         Mood and Affect: Mood normal.             Assessment/Plan:  1. Hospital discharge follow-up  Continue with current POC  Continue current meds  FU with cardiology appointment and pacer check    2. Type 2 diabetes mellitus without complication, without long-term current use of insulin ([address]  FU with Isaiah Pro for management of chronic needs    3. Essential hypertension  Continue to monitor through home care    4. Weakness of both lower extremities  Continue to use walker  Encourage as much activity as possble, ie. Gong to the table for meals vs having food brought to him    5. Gait abnormality  Continue PT and use of walker    6.  Parkinson's disease ([address]  Continue current meds        Medical Decision Making: high complexity

## 2021-03-12 NOTE — TELEPHONE ENCOUNTER
Per home care nurse, patient will be resuming H/C and needs to recertify. Gave verbal to nurse.   Orders will foloow

## 2021-03-15 NOTE — TELEPHONE ENCOUNTER
Will you call Dr Osiel Tariq office and see if they will handle this call from patient today or tomorrow?

## 2021-03-15 NOTE — TELEPHONE ENCOUNTER
Mary Hermosillo called in to see if ed can take a extra water pill. Mary Hermosillo said he is starting to have a lot of swelling in his legs and his weight is going up. He was actually getting a return call from cardio so he may not need anything from our office he will call back if he does.

## 2021-03-15 NOTE — TELEPHONE ENCOUNTER
Tomas Blount  with [facility] calling to note:    Per family,Patient wet the bed last night which is unusual for him  A week ago his weight was 138.5, and today it was 144.5  He is SOB with activity  Retaining fluid in both legs  Family clled the cardiologist this moring to asks about this. Asking if Coreen Moralez wants to do anything or wait until cardio calls back. Please call Tomas Blount back back with any suggestions  [phone]    Also, she re-certified patient for PT - 1x week for 1 week  2x week for 2 weeks  1x week for 2 weeks   I gave verbal authorization - orders will follow.

## 2021-03-17 NOTE — TELEPHONE ENCOUNTER
Spoke with [facility] , they were contacted by cardiology and instructed to increase lasix due to weight gain . They will follow up with Corl's office.

## 2021-03-19 NOTE — TELEPHONE ENCOUNTER
HEATHER:    Solo Given with [facility] calling to state that patients pulse today was 115 three different times. His 02 was 89, but when he walked it went down to 59. He appears to be feeling OK. Nurse left a pulse ox meter with the facility and they will continue to monitor his oxygen levels. Kailyn's number is [phone] if needed    Also asking for a verbal for speech therapy 1x week for 5 weeks.   I gave the verbal authorization

## 2021-03-19 NOTE — TELEPHONE ENCOUNTER
spoke with patient's family and home care nurse. Arnulfo Willoughby nurse was not sure that the pulse ox was accurate as patient had tremor and was sleeping when pt started the assessement. SPoke with patient's daughter and son in law. State that patient is comfortable. No change in confusion. They are having trouble getting pulse ox with patient's tremor. Advised they could use his toes or ear to get pulse ox. They agree to monitor patient closely and if any consistent readings < 90%, advised to go to ER. Will check on patient on Monday and recommend pulmonology evaluation. Sunny Horowitz, please call to see how patient is doing and what are O2 readings at home?

## 2021-03-22 NOTE — TELEPHONE ENCOUNTER
I just opened this and saw appt 3/30 - can we get oxygen company to assess patient and get him on O2. Also can we get [facility] to do xray in home or can we get him to diagnostics for xray?

## 2021-03-22 NOTE — TELEPHONE ENCOUNTER
Patient went with aide to get a shower this morning. And when he got back to his room, he wasn't real winded. , his   Fingers were not blue, good color in cheeks , he  was responsive  and talkative and seems better than he has been doing. They are thinking an ER visit might not be necessary. They want to speak to Nicky Solsi about him.   Call Wood back [phone]

## 2021-03-22 NOTE — TELEPHONE ENCOUNTER
Valarie weathers / [phone]  Jasmina Paniagua /   For in O2 with Medicare this is not an at home covered benefit through [facility]   It has to be ordered and done through physicians office. Xray / Can be done through [facility] [phone]    Online ordering system   [employer]    Place Xray order and I will contact them.

## 2021-03-22 NOTE — TELEPHONE ENCOUNTER
Son in law calling to note that they are having trouble getting the correct oxygen levels on patient. Seems OK when he is sitting or lying down - in the 97-99 range. But when he moves around, its up and down in the 60's and he has heavy  breathing, And sometimes they can't get a reading at all due to his shaky hands from Parkinsons.     Please advise  Call Lavelle Blair  at [phone]

## 2021-03-22 NOTE — TELEPHONE ENCOUNTER
I have placed referral to Dr Carole Blum. Please follow up and make sure patient gets appointment as soon as possible to be assessed.

## 2021-03-24 NOTE — TELEPHONE ENCOUNTER
Spoke with Miriam Baez and he states pt appears to be doing well. Less confusion. Ambulates in home well w assistance walker. Advised to monitor closely and go to ER if any worsening of symptoms.

## 2021-03-30 NOTE — PATIENT INSTRUCTIONS
Remember to bring a list of pulmonary medications and any CPAP or BiPAP machines to your next appointment with the office. Please keep all of your future appointments scheduled by [facility]. Out of respect for other patients and providers, you may be asked to reschedule your appointment if you arrive later than your scheduled appointment time. Appointments cancelled less than 24hrs in advance will be considered a no show. Patients with three missed appointments within 1 year or four missed appointments within 2 years can be dismissed from the practice. Please be aware that our physicians are required to work in the Intensive Care Unit at [facility].  Your appointment may need to be rescheduled if they are designated to work during your appointment time. You may receive a survey regarding the care you received during your visit. Your input is valuable to us. We encourage you to complete and return your survey. We hope you will choose us in the future for your healthcare needs. Pt instructed of all future appointment dates & times, including radiology, labs, procedures & referrals. If procedures were scheduled preparation instructions provided. Instructions on future appointments with [facility] were given.

## 2021-03-30 NOTE — PROGRESS NOTES
Chief Complaint/Referring Provider:  Patient is being seen at the request of ELEONORA Ulloa for a consultation for Hypoxia     Presenting HPI: Patient is a 72-year-old male who was referred to the office for hypoxemia  Patient apparently has been having shortness of breath for some time but it was exacerbated recently when he had MI a few weeks back, patient does have history of significant parkinsonism along with some dementia as per family and does not give much review of system but patient as per the family has shortness of breath with exertion, the patient's shortness of breath sometimes improves with rest but sometimes it does not, patient also has been having low oxygen levels at home when checked but it may not be objective because patient was shaking a lot as per family, patient does have a significant rhinorrhea and drips clear liquid fluid from the nasal cavity but has been told it may be secondary to some other medication he takes for parkinsonism, patient does have history of broken vertebral column in the past, patient also has been having some paradoxical movement of the right chest as per patient's home care nursing has been treated by the family, patient has been having some swallowing issues and patient has a speech therapist who comes at home with exercise, patient's appetite has gone down, patient's has had some weight loss, patient had significant swelling of the legs in the recent times along with shortness of breath when he was hospitalized for MI and at that time patient was given Lasix drip with improvement but patient again had increased leg swelling when patient was given Lasix 3 times last week and so far it has been once when he got some Lasix, patient has been not given any Lasix on a regular basis as per the family because of his overall clinical condition n.p.o. intake be on the lower side, patient does have history assistive of oropharyngeal dysphagia, patient is not able to expectorate any significant, patient did not have any fever or chills, no obvious vomiting, patient does not have any epistaxis or hemoptysis, no hematochezia or melena, patient does not have any change in the ambient environment or any change of medications, patient does not have any other pertinent review of system of concern which could be objectively ascertained. Past Medical History:   Diagnosis Date    Bruit     Dementia [facility]     Elevated PSA     HBP (high blood pressure)     Hyperlipidemia     IGT (impaired glucose tolerance)     IH (inguinal hernia)     LBBB (left bundle branch block)     Murmur     NIDDY (non-insulin dependent diabetes mellitus in young) ([facility]     Pacer at end of battery life     Parkinson disease ([facility]     Post PTCA     S/P lens implant        Past Surgical History:   Procedure Laterality Date    COLONOSCOPY      EYE SURGERY      HERNIA REPAIR         Allergies   Allergen Reactions    Pcn [Penicillins]      rash       Medication list was reviewed and updated as needed in Epic    Social History     Socioeconomic History    Marital status:       Spouse name: Not on file    Number of children: Not on file    Years of education: Not on file    Highest education level: Not on file   Occupational History    Not on file   Social Needs    Financial resource strain: Not on file    Food insecurity     Worry: Not on file     Inability: Not on file    Transportation needs     Medical: Not on file     Non-medical: Not on file   Tobacco Use    Smoking status: Former Smoker     Packs/day: 0.50     Types: Cigarettes, Pipe, Cigars     Quit date: 1956     Years since quittin.2    Smokeless tobacco: Never Used   Substance and Sexual Activity    Alcohol use: Not Currently     Comment: 1 GLASS WINE  A DAY    Drug use: No    Sexual activity: Not on file   Lifestyle    Physical activity     Days per week: Not on file     Minutes per session: Not on file    Stress: Not on file   Relationships    Social connections     Talks on phone: Not on file     Gets together: Not on file     Attends Mandaeism service: Not on file     Active member of club or organization: Not on file     Attends meetings of clubs or organizations: Not on file     Relationship status: Not on file    Intimate partner violence     Fear of current or ex partner: Not on file     Emotionally abused: Not on file     Physically abused: Not on file     Forced sexual activity: Not on file   Other Topics Concern    Not on file   Social History Narrative    Not on file       Family History   Problem Relation Age of Onset    Cancer Mother     Diabetes Mother     Alzheimer's Disease Father             Review of Systems same as above    Physical Exam:  Blood pressure 116/74, pulse 85, temperature 95.3 °F (35.2 °C), weight 141 lb 12.8 oz (64.3 kg), SpO2 (!) 85 %.'  Multiple pulse oximetry were attempted without any discrete reading because of patient's cold hands and peripheral cyanosis    Constitutional:  No acute distress. HENT:  Oropharynx is clear and moist. No thyromegaly. Eyes:  Conjunctivae arenormal. Pupils equal, round, and reactive to light. No scleral icterus. Neck: . No tracheal deviation present. No obvious thyroid mass. Cardiovascular:Normal rate, regular rhythm, normal heart sounds. No right ventricular heave. minimal lower extremity edema. Pulmonary/Chest: No wheezes. Bibasilar  rales. Chest wall is not dull to percussion. Noaccessory muscle usage or stridor. decreased BSI    Abdominal: Soft. Bowel sounds present. No distension or hernia. Notenderness. Musculoskeletal: No cyanosis. No clubbing. No obvious joint deformity. Lymphadenopathy: No cervical or supraclavicular adenopathy. Skin: Skin is warm and dry. No rash or nodules on the exposed extremities. Peripheral cyanosis of the finger with cold hands   Psychiatric: Normal mood and affect.  Behavior is normal.  No anxiety. Neurologic: Alert, awake and oriented. PERRL. Speech fluent with intermittent sleepiness /tiredness      Data:     Imaging:  I have reviewed radiology images personally. FL MODIFIED BARIUM SWALLOW W VIDEO    (Results Pending)     Xr Chest Portable    Result Date: 3/5/2021  EXAMINATION: ONE XRAY VIEW OF THE CHEST 3/5/2021 2:44 pm COMPARISON: 04/26/2020 HISTORY: ORDERING SYSTEM PROVIDED HISTORY: sob TECHNOLOGIST PROVIDED HISTORY: Reason for exam:->sob Reason for Exam: sob Acuity: Acute Type of Exam: Initial FINDINGS: Left subclavian dual lead pacer. Cardiomegaly. Pulmonary vasculature within normal limits. Left greater than right bibasilar opacities, predominantly strandy in morphology. Costophrenic angles sharp     1. Cardiomegaly with no evidence of pulmonary edema 2. Bibasilar opacities, atelectasis or pneumonia     ECHO-Summary   Technically difficult examination due to patient inability to lay flat. LV systolic function is severely reduced with a visually estimated EF of   25-30 %. EF estimated by Kim's method at 24 %. The left ventricle is normal in size with normal wall thickness. Severe global hypokinesis with more prominent inferior HK on certain views. Elevated left atrial pressures with a septal E/e' ratio of 16.2. Right ventricular systolic function appears moderately reduced. Pacemaker/ICD wire visualized in right-sided chambers. Systolic pulmonary artery pressure (SPAP) is normal and estimated at 29mmHg   (right atrial pressure 8mmHg). Assessment:    1. Oropharyngeal dysphagia    - FL MODIFIED BARIUM SWALLOW W VIDEO; Future    2. Parkinson's disease ([address]    - FL MODIFIED BARIUM SWALLOW W VIDEO; Future    3. Hypoxia    - FL MODIFIED BARIUM SWALLOW W VIDEO; Future  - 6 Minute Walk Test; Future    4. Ischemic cardiomyopathy      5.  Pulmonary infiltrates          Plan:   · Patient's review of system as elucidated by the family was discussed  · Patient and family were told about the clinical findings including auscultation  · Patient recent x-ray chest was reviewed with the family along with findings, differential diagnosis and options  · Patient's echocardiogram done in 2020 was reviewed and patient has significant cardiomyopathy  · Patient also had recent MI  · Patient and family were told about the pathophysiology of the disease process and its modifying factors  · Patient has a shortness of breath which may be a combination of patient having aspiration into the airways/atelectasis/cardiomyopathy along with oropharyngeal dysphagia on top of having parkinsonism  · Patient does not have any obvious wheezing at this time  · Bronchoscopy may not be an option given the patient's current overall clinical status and patient having issues with sedation as discussed with family  · Patient needs a modified barium swallow to assess if patient has any microaspiration and if so then speech therapy can recommend ideal consistency of the diet  · Patient also needs not more than 2000 mg of sodium within 24 hours time and not more than 64 ounces of fluid in 24 hours time  · Patient drinks a lot of Ensure/boost at home as per the family but the fluid restriction needs to be monitored  · Cardiac medications including diuretics as per cardiology and internal medicine team  · Patient has peripheral cyanosis which is intermittent in nature along with a cold hands which may be secondary to decreased circulation and objective pulse oximetry could not be ascertained  · Patient needs a 6-minute walk test with a pulse oximeter probe on the forehead to see if patient has any true hypoxemia which requires supplemental oxygen  · Patient does not need any bronchodilators or steroids or antibiotics for now  · Patient is up-to-date on the COVID-19 vaccines  · Further management depending on patient clinical status and the follow-up on above recommendations along with the test results  · Patient's daughter has [address] discussed about discussing about CODE STATUS

## 2021-03-30 NOTE — PROGRESS NOTES
MA Communication:   The following orders are received by verbal communication from Nila Farias MD    Orders include:    Modified Barium swallow scheduled for April 1, 2021 9:00 am arrival/ 9:30 am test.    6MW scheduled for April 2,2021 at 9:30 am    VV with Dr. Sharri Adams April 14, 2021 9:00 am

## 2021-03-30 NOTE — TELEPHONE ENCOUNTER
Within this Telehealth Consent, the terms you and yours refer to the person using the Telehealth Service (Service), or in the case of a use of the Service by or on behalf of a minor, you and yours refer to and include (i) the parent or legal guardian who provides consent to the use of the Service by such minor or uses the Service on behalf of such minor, and (ii) the minor for whom consent is being provided or on whose behalf the Service is being utilized. When using Service, you will be consulting with your health care providers via the use of Telehealth.   Telehealth involves the delivery of healthcare services using electronic communications, information technology or other means between a healthcare provider and a patient who are not in the same physical location. Telehealth may be used for diagnosis, treatment, follow-up and/or patient education, and may include, but is not limited to, one or more of the following:    Electronic transmission of medical records, photo images, personal health information or other data between a patient and a healthcare provider    Interactions between a patient and healthcare provider via audio, video and/or data communications    Use of output data from medical devices, sound and video files    Anticipated Benefits   The use of Telehealth by your Provider(s) through the Service may have the following possible benefits:    Making it easier and more efficient for you to access medical care and treatment for the conditions treated by such Provider(s) utilizing the Service    Allowing you to obtain medical care and treatment by Provider(s) at times that are convenient for you    Enabling you to interact with Provider(s) without the necessity of an in-office appointment     Possible Risks   While the use of Telehealth can provide potential benefits for you, there are also potential risks associated with the use of Telehealth.  These risks include, but may not be limited to the following:    Your Provider(s) may not able to provide medical treatment for your particular condition and you may be required to seek alternative healthcare or emergency care services.  The electronic systems or other security protocols or safeguards used in the Service could fail, causing a breach of privacy of your medical or other information.  Given regulatory requirements in certain jurisdictions, your Provider(s) diagnosis and/or treatment options, especially pertaining to certain prescriptions, may be limited. Acceptance   1. You understand that Services will be provided via Telehealth. This process involves the use of HIPAA compliant and secure, real-time audio-visual interfacing with a qualified and appropriately trained provider located at [facility]. 2. You understand that, under no circumstances, will this session be recorded. 3. You understand that the Provider(s) at [facility] and other clinical participants will be party to the information obtained during the Telehealth session in accordance with best medical practices. 4. You understand that the information obtained during the Telehealth session will be used to help determine the most appropriate treatment options. 5. You understand that You have the right to revoke this consent at any point in time. 6. You understand that Telehealth is voluntary, and that continued treatment is not dependent upon consent. 7. You understand that, in the event of non-consent to Telehealth services and/or technical difficulties, you will obtain services as typically provided in the absence of Telehealth technology. 8. You understand that this consent will be kept in Your medical record. 9. No potential benefits from the use of Telehealth or specific results can be guaranteed. Your condition may not be cured or improved and, in some cases, may get worse.    10. There are limitations in the provision of medical care and treatment via Telehealth and the Service and you may not be able to receive diagnosis and/or treatment through the Service for every condition for which you seek diagnosis and/or treatment. 11. There are potential risks to the use of Telehealth, including but not limited to the risks described in this Telehealth Consent. 12. Your Provider(s) have discussed the use of Telehealth and the Service with you, including the benefits and risks of such and you have provided oral consent to your Provider(s) for the use of Telehealth and the Service. 15. You understand that it is your duty to provide your Provider(s) truthful, accurate and complete information, including all relevant information regarding care that you may have received or may be receiving from other healthcare providers outside of the Service. 14. You understand that each of your Provider(s) may determine in his or sole discretion that your condition is not suitable for diagnosis and/or treatment using the Service, and that you may need to seek medical care and treatment a specialist or other healthcare provider, outside of the Service. 15. You understand that you are fully responsible for payment for all services provided by Provider(s) or through use of the Service and that you may not be able to use third-party insurance. 16. You represent that (a) you have read this Telehealth Consent carefully, (b) you understand the risks and benefits of the Service and the use of Telehealth in the medical care and treatment provided to you by Provider(s) using the Service, and (c) you have the legal capacity and authority to provide this consent for yourself and/or the minor for which you are consenting under applicable federal and state laws, including laws relating to the age of [de-identified] and/or parental/guardian consent.    17. You give your informed consent to the use of Telehealth by Provider(s) using the Service under the terms described in the Terms of Service and this Telehealth Consent. The patient was read the following statement and has consented to the visit as of 3/30/21. The patient has been scheduled for their first telehealth visit on 04/16/2021 with Dr. Deena Ramírez.

## 2021-04-01 NOTE — PROCEDURES
[address] [place] [address] Pulmonary Function Lab - Six Minute Walk      Test Performed on:   Room Air_X__   Oxygen at _____ lpm via N/C- continuous Oxygen at _____ lpm via N/C- OCD  Assist Device Used During Test:  None______ Cane________ Walker___X____    Modified Fatimah's Scale  0 Nothing at all 5 Strong    0.5 Extremely Weak 6 Stronger (Hard)    1 Very weak 7 Very Strong   2 Weak (light) 8 Very Very Strong   3 Moderate 9 Extremely Strong   4 Somewhat Strong 10 Maximum All out      Time SpO2 Heart Rate Respiratory Rate Dyspnea-  Modified Fatimahs Scale Fatigue- Modified Fatimahs Scale Other Symptoms   Baseline                     99% 89 16 0 0      1 minute                     98% 99 18 0 0    2 minutes                     98% 106 20 0 1      3 minutes                     99% 99 20 1 2    4 minutes                     98% 105 20 1 2    5 minutes                     98% 106 18 2 2    6 minutes                     98% 99 20 2 2    Recovery x 1 minute                     98% 91 16 0 1    Recovery x 2 Minute                     98% 90 16 0 0     Number of Laps___6____ X 100 feet + _________ additional feet = Total Distance __600___feet   Stopped or paused before 6 minutes? No_______ Yes __X____  Total expected 6 MW distance is ___1967__feet. Patient achieved __30__% of expected distance.    Pre Blood Pressure:116/76  Post Blood Pressure:122/77  Other symptoms at the end of exercise:    Nasal SpO2 Sensor used-placed on Left Naris

## 2021-04-07 NOTE — PROCEDURES
[address] [address]                               PULMONARY FUNCTION    PATIENT NAME: Robert Barnett                   :        1935  MED REC NO:   [id number]                          ROOM:  ACCOUNT NO:   [de-identified]                           ADMIT DATE: 2021  PROVIDER:     Earline Persaud MD    DATE OF PROCEDURE:  2021    SIX-MINUTE WALK TEST    A six-minute walk test was performed utilizing standard criteria on  2021. Baseline oxygen saturation was 99%, Fatimah scales were 0  each. The patient walked a total distance of 600 feet, 30% predicted. There was no desaturation, there was mild increase in heart rate and  respiratory rate. Fatimah scales peaked at 2 each. IMPRESSION:  Significantly reduced six-minute walk distance without  desaturation. Clinical correlation is recommended.         Memo Gunter MD    D: 2021 12:45:37       T: 2021 18:58:30     [id number]  Job#: [id number]     Doc#: [id number]    CC:  Zeny Miller MD

## 2021-04-15 PROBLEM — R06.02 SOB (SHORTNESS OF BREATH): Status: ACTIVE | Noted: 2021-01-01

## 2021-04-15 NOTE — PROGRESS NOTES
Chief Complaint/Referring Provider:  Pulmonary follow up     A virtual pulmonary visit was done for the patient to discuss the clinical status and the test results, patient does have shortness of breath which is persistent and more so with exertion from going to the bed to the bathroom as per patient's family, patient states that he does not have any increasing cough or expectoration, patient does not have any chest pain, patient has chronic rhinorrhea, patient does not have any wheezing, patient does not have any pleuritic chest pain, no fever no chills, patient is eating has been down recently, patient also has started chewing his medications and has been told not to do so by his family especially the metoprolol and the Flomax, patient does not have any significant dysphagia at this time, patient also has been having increased water retention in the legs, patient has been given Lasix by the family, patient does not have any increasing confusion lethargy, no other pertinent review of system of concern     Previous HPI: Patient is a 49-year-old male who was referred to the office for hypoxemia  Patient apparently has been having shortness of breath for some time but it was exacerbated recently when he had MI a few weeks back, patient does have history of significant parkinsonism along with some dementia as per family and does not give much review of system but patient as per the family has shortness of breath with exertion, the patient's shortness of breath sometimes improves with rest but sometimes it does not, patient also has been having low oxygen levels at home when checked but it may not be objective because patient was shaking a lot as per family, patient does have a significant rhinorrhea and drips clear liquid fluid from the nasal cavity but has been told it may be secondary to some other medication he takes for parkinsonism, patient does have history of broken vertebral column in the past, patient also has arenormal. Pupils equal, round, and reactive to light. No scleral icterus. Neck: . No tracheal deviation present. No obvious thyroid mass. Cardiovascular:Normal rate, regular rhythm, normal heart sounds. No right ventricular heave. minimal lower extremity edema. Pulmonary/Chest: No wheezes. Bibasilar  rales. Chest wall is not dull to percussion. Noaccessory muscle usage or stridor. decreased BSI    Abdominal: Soft. Bowel sounds present. No distension or hernia. Notenderness. Musculoskeletal: No cyanosis. No clubbing. No obvious joint deformity. Lymphadenopathy: No cervical or supraclavicular adenopathy. Skin: Skin is warm and dry. No rash or nodules on the exposed extremities. Peripheral cyanosis of the finger with cold hands   Psychiatric: Normal mood and affect. Behavior is normal.  No anxiety. Neurologic: Alert, awake and oriented. PERRL. Speech fluent with intermittent sleepiness /tiredness  (deferred)    Data:     Imaging:  I have reviewed radiology images personally. No orders to display     Xr Chest Portable    Result Date: 3/5/2021  EXAMINATION: ONE XRAY VIEW OF THE CHEST 3/5/2021 2:44 pm COMPARISON: 04/26/2020 HISTORY: ORDERING SYSTEM PROVIDED HISTORY: sob TECHNOLOGIST PROVIDED HISTORY: Reason for exam:->sob Reason for Exam: sob Acuity: Acute Type of Exam: Initial FINDINGS: Left subclavian dual lead pacer. Cardiomegaly. Pulmonary vasculature within normal limits. Left greater than right bibasilar opacities, predominantly strandy in morphology. Costophrenic angles sharp     1. Cardiomegaly with no evidence of pulmonary edema 2. Bibasilar opacities, atelectasis or pneumonia     ECHO-Summary   Technically difficult examination due to patient inability to lay flat. LV systolic function is severely reduced with a visually estimated EF of   25-30 %. EF estimated by Kim's method at 24 %. The left ventricle is normal in size with normal wall thickness.    Severe global hypokinesis with more prominent inferior HK on certain views. Elevated left atrial pressures with a septal E/e' ratio of 16.2. Right ventricular systolic function appears moderately reduced. Pacemaker/ICD wire visualized in right-sided chambers. Systolic pulmonary artery pressure (SPAP) is normal and estimated at 29mmHg   (right atrial pressure 8mmHg). Patient 6-minute walk test does not qualify him for any supplemental oxygen      Patient modified barium swallow does not show any jef aspiration  Patient 6-minute walk test did not support any need for supplemental oxygen    Assessment:    1. Shortness of breath      2. Parkinson's disease ([address]        3.4. Ischemic cardiomyopathy      5. Protein calorie malnutrition          Plan:   · Patient's review of system were discussed  · Patient's modified barium swallow does not show any jef aspiration  · Patient has to chew his food slowly  · Patient should avoid chewing metoprolol as it is XR  · Patient does not need any supplemental oxygen on the base of second and walk test  · Patient's echocardiogram done in 2020 was reviewed and patient has significant cardiomyopathy  · Patient also had recent MI  · Patient and family were told about the pathophysiology of the disease process and its modifying factors  · Patient does not need any bronchodilators from pulmonary standpoint of view  · Speech follow-up  · Patient also needs not more than 2000 mg of sodium within 24 hours time and not more than 64 ounces of fluid in 24 hours time  · Patient drinks a lot of Ensure/boost at home as per the family but the fluid restriction needs to be monitored  · Diuretics as per cardiology  · Daily weights for titration of the diuretics  · Patient is up-to-date on the COVID-19 vaccines  · Patient's daughter has [address] discussed about discussing about [employer], was evaluated through a synchronous (real-time) audio-video encounter.  The patient (or guardian if applicable) is aware that this is a billable service. Verbal consent to proceed has been obtained within the past 12 months. The visit was conducted pursuant to the emergency declaration under the [address] [place] [address] authority and the [employer] and [employer] General Act. Patient identification was verified, and a caregiver was present when appropriate. The patient was located in a state where the provider was credentialed to provide care. Total time spent for this encounter: Not billed by time    --Flaco Pelletier MD on 4/15/2021 at 10:35 AM    An electronic signature was used to authenticate this note.

## 2021-04-15 NOTE — PROGRESS NOTES
MA Communication: The following orders are received by verbal communication from Gaston Lange MD    Orders include:     Follow up: PRN

## 2021-04-15 NOTE — PATIENT INSTRUCTIONS
Remember to bring a list of pulmonary medications and any CPAP or BiPAP machines to your next appointment with the office. Please keep all of your future appointments scheduled by [address], [facility] Pulmonary office. Out of respect for other patients and providers, you may be asked to reschedule your appointment if you arrive later than your scheduled appointment time. Appointments cancelled less than 24hrs in advance will be considered a no show. Patients with three missed appointments within 1 year or four missed appointments within 2 years can be dismissed from the practice. Please be aware that our physicians are required to work in the Intensive Care Unit at [facility].  Your appointment may need to be rescheduled if they are designated to work during your appointment time. You may receive a survey regarding the care you received during your visit. Your input is valuable to us. We encourage you to complete and return your survey. We hope you will choose us in the future for your healthcare needs. Pt instructed of all future appointment dates & times, including radiology, labs, procedures & referrals. If procedures were scheduled preparation instructions provided. Instructions on future appointments with [facility] Pulmonary were given.

## 2021-04-16 PROBLEM — R53.1 WEAKNESS: Status: ACTIVE | Noted: 2021-01-01

## 2021-04-16 NOTE — ACP (ADVANCE CARE PLANNING)
ADVANCED CARE PLANNING    Name:Aldo Naranjo       :  1935              MRN:  [id number]  Admission Date: 2021 12:12 PM  Date of Discussion:  21      Purpose of Encounter: Advanced care planning in light of dementia, parkinsons, cardiomyopathy, near syncope. Parties in attendance: :Mir José MD, Family members: daughter. Decisional Capacity:No      Objective/Medical Story:  81 yo WM with dementia, parkinsons, signif cardiomyopathy p/w near syncope and concern for cva/tia. Discussed pt's case with daughter and code status adjusted per family request.    Active Diagnoses: Active Hospital Problems    Diagnosis Date Noted    Weakness [R53.1] 2021       These active diagnoses are of sufficient risk that focused discussion on advance care planning is indicated in order to allow the patient to thoughtfully consider personal goals of care; and if situations arise that prevent the ability to personally give input, to ensure appropriate representation of their personal desires for different levels and agressiveness of care. Goals of Care Determinations: Patient wishes to focus on aggressive but not heroic care. Code Status: At this time patient wishes to be LIMITED. Time Spent on Advanced Planning Documents: 16 mins. The following items were considered in medical decision making:  Independent review of images , Review / order clinical lab tests. Review / order radiology tests, Decision to obtain old records. Advanced Care Planning Documents:   Completed advances directives, advanced directives in chart. Electronically signed by Carson Palacios MD on 2021 at 5:24 PM    Thank you TEMITOPE Holman CNP for the opportunity to be involved in this patient's care. If you have any questions or concerns please feel free to contact me at [phone].

## 2021-04-16 NOTE — PLAN OF CARE
[address] [place] [address]    Name:  Wendy Crain (26 y.o., male)  : 1935  MRN:  [id number]  Today's Date: 2021    Stroke team contacted at: 12:10  History obtained from: emergency physician    Wendy Crain is a 80 y.o. male who presented with LOC episode. Briefly, 80 y.o. with a history of dementia and recent NSTEMI. I understand he was ambulating with a walker when he had an episode of loss of consciousness and fall. He was more confused when he regained consciousness, and there concern on scene for possible facial droop. In ED, pt awake with possible facial assymetry, but no other deficits, for NIHSS 1. NCCT: No acute process  CTA: No LVO    Acute Ischemic Stroke Clinical Decision Making:    (1) Intravenous tPA:    The patient is not a candidate for iv TPA based on:  Symptoms nondisabling  Other (see below)    Additional Information: At this point, with broad differential for episode today in context of low NIHSS - and unclear if this is changed from baseline - would consider risks of tPA to outweigh any potential benefits, and would not be a candidate for tPA. (2) Angiography / Thrombectomy:  The patient does not have evidence of a proximal large vessel occlusion on CTA, so not an EVT candidate. For any additional questions regarding acute stroke care, please feel free to contact the  Stroke Team at [phone].      Abdoul Vela MD   Stroke Team   2021 1:28 PM

## 2021-04-16 NOTE — TELEPHONE ENCOUNTER
FYI-Patient is being discharged from home care, patient is back to baseline that he was at a year ago     [address]

## 2021-04-16 NOTE — ED PROVIDER NOTES
Emergency Department provider note    CHIEF COMPLAINT  Cerebrovascular Accident      HISTORY OF PRESENT ILLNESS  Diana Littlejohn is a 80 y.o. male  who presents to the ED after Parkinson's, dementia with complaints of near syncope and possible CVA. Patient was reportedly ambulating with assistance when he leaned forward on his walker and became unresponsive. EMS was contacted and found patient still standing but with confusion. In route, it was noted that patient has a right facial droop. This was not previously reported by caretaker. No other complaints, modifying factors or associated symptoms. I have reviewed the following from the nursing documentation. 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) ([facility] [address]     Pacer at end of battery life     Parkinson disease ([facility] [address]     Post PTCA     S/P lens implant      Past Surgical History:   Procedure Laterality Date    COLONOSCOPY      EYE SURGERY      HERNIA REPAIR       Family History   Problem Relation Age of Onset    Cancer Mother     Diabetes Mother     Alzheimer's Disease Father      Social History     Socioeconomic History    Marital status:       Spouse name: Not on file    Number of children: Not on file    Years of education: Not on file    Highest education level: Not on file   Occupational History    Not on file   Social Needs    Financial resource strain: Not on file    Food insecurity     Worry: Not on file     Inability: Not on file    Transportation needs     Medical: Not on file     Non-medical: Not on file   Tobacco Use    Smoking status: Former Smoker     Packs/day: 0.50     Types: Cigarettes, Pipe, Cigars     Quit date: 1/1/1956     Years since quittin.3    Smokeless tobacco: Never Used   Substance and Sexual Activity    Alcohol use: Not Currently     Comment: 1 GLASS WINE  A DAY    Drug use: No    Sexual activity: Not on file   Lifestyle    Physical activity     Days per week: Not on file     Minutes per session: Not on file    Stress: Not on file   Relationships    Social connections     Talks on phone: Not on file     Gets together: Not on file     Attends Anabaptist service: Not on file     Active member of club or organization: Not on file     Attends meetings of clubs or organizations: Not on file     Relationship status: Not on file    Intimate partner violence     Fear of current or ex partner: Not on file     Emotionally abused: Not on file     Physically abused: Not on file     Forced sexual activity: Not on file   Other Topics Concern    Not on file   Social History Narrative    Not on file     No current facility-administered medications for this encounter.       Current Outpatient Medications   Medication Sig Dispense Refill    metFORMIN (GLUCOPHAGE) 500 MG tablet 2 tablets twice daily with meals, BLOOD SUGARS 360 tablet 3    donepezil (ARICEPT) 10 MG tablet TAKE 1 TABLET BY MOUTH NIGHTLY FOR  MEMORY 90 tablet 3    pravastatin (PRAVACHOL) 40 MG tablet Take 1 tablet by mouth once daily 90 tablet 1    lisinopril (PRINIVIL;ZESTRIL) 2.5 MG tablet Take 1 tablet by mouth once daily 90 tablet 1    clopidogrel (PLAVIX) 75 MG tablet Take 1 tablet by mouth once daily 90 tablet 1    potassium chloride (KLOR-CON) 20 MEQ packet Take 20 mEq by mouth daily 30 each 3    furosemide (LASIX) 20 MG tablet Take 20 mg by mouth daily      tamsulosin (FLOMAX) 0.4 MG capsule Take 1 capsule by mouth once daily 90 capsule 3    aspirin 81 MG chewable tablet Take 1 tablet by mouth daily 30 tablet 0    metoprolol succinate (TOPROL XL) 25 MG extended release tablet Take 0.5 tablets by mouth daily 30 tablet 3    carbidopa-levodopa (SINEMET)  MG per tablet Take 1.5 tablets by mouth 3 times daily       therapeutic multivitamin-minerals (THERAGRAN-M) tablet Take 1 tablet by mouth daily. Allergies   Allergen Reactions    Pcn [Penicillins]      rash       REVIEW OF SYSTEMS  10 systems reviewed, pertinent positives per HPI otherwise noted to be negative. PHYSICAL EXAM  BP 96/67   Pulse 89   Temp 97.5 °F (36.4 °C)   Resp 26   Ht 6' 2\" (1.88 m)   Wt 146 lb 12.8 oz (66.6 kg)   SpO2 95%   BMI 18.85 kg/m²   GENERAL APPEARANCE: Awake and alert. Cooperative. No distress. HEAD: Normocephalic. Atraumatic. EYES: PERRL. EOM's grossly intact. ENT: Mucous membranes are moist.   NECK: Supple. HEART: RRR. No murmurs. LUNGS: Respirations unlabored. CTAB. Good air exchange. Speaking comfortably in full sentences. ABDOMEN: Soft. Non-distended. Non-tender. No masses. No organomegaly. No guarding or rebound. EXTREMITIES: No peripheral edema. Moves all extremities equally. All extremities neurovascularly intact. SKIN: Warm and dry. No acute rashes. NEUROLOGICAL: Alert and oriented. Please see NIH Stroke Scale below. PSYCHIATRIC: Normal mood and affect. NIH Stroke Scale     Interval: Baseline  Time: 3414  Person Administering Scale: Reg Joe,    Administer stroke scale items in the order listed. Record performance in each category after each subscale exam. Do not go back and change scores. Follow directions provided for each exam technique. Scores should reflect what the patient does, not what the clinician thinks the patient can do. The clinician should record answers while administering the exam and work quickly. Except where indicated, the patient should not be coached (i.e., repeated requests to patient to make a special effort). 1a  Level of consciousness: 0=alert; keenly responsive   1b. LOC questions:  0=Performs both tasks correctly   1c. LOC commands: 0=Performs both tasks correctly   2. Best Gaze: 0=normal   3.  Visual: 1=Partial hemianopia   4. Facial Palsy: 0=Normal symmetric movement   5a. Motor left arm: 0=No drift, limb holds 90 (or 45) degrees for full 10 seconds   5b. Motor right arm: 0=No drift, limb holds 90 (or 45) degrees for full 10 seconds   6a. motor left le=No drift, limb holds 90 (or 45) degrees for full 10 seconds   6b  Motor right le=No drift, limb holds 90 (or 45) degrees for full 10 seconds   7. Limb Ataxia: 0=Absent   8. Sensory: 0=Normal; no sensory loss   9. Best Language:  0=No aphasia, normal   10. Dysarthria: 0=Normal   11. Extinction and Inattention: 0=No abnormality   12. Distal motor function: 0=Normal    Total:  1     Modified Polk Score - Assessing Disability From Stroke    Score: 1 - No significant disability despite symptoms; able to carry out all usual duties and activities    LABS  I have reviewed all labs for this visit.    Results for orders placed or performed during the hospital encounter of 21   CBC Auto Differential   Result Value Ref Range    WBC 4.2 4.0 - 11.0 K/uL    RBC 3.86 (L) 4.20 - 5.90 M/uL    Hemoglobin 12.3 (L) 13.5 - 17.5 g/dL    Hematocrit 36.5 (L) 40.5 - 52.5 %    MCV 94.7 80.0 - 100.0 fL    MCH 31.8 26.0 - 34.0 pg    MCHC 33.5 31.0 - 36.0 g/dL    RDW 14.5 12.4 - 15.4 %    Platelets 473 708 - 054 K/uL    MPV 9.8 5.0 - 10.5 fL   Comprehensive Metabolic Panel w/ Reflex to MG   Result Value Ref Range    Sodium 136 136 - 145 mmol/L    Potassium reflex Magnesium 4.3 3.5 - 5.1 mmol/L    Chloride 96 (L) 99 - 110 mmol/L    CO2 21 21 - 32 mmol/L    Anion Gap 19 (H) 3 - 16    Glucose 219 (H) 70 - 99 mg/dL    BUN 46 (H) 7 - 20 mg/dL    CREATININE 1.0 0.8 - 1.3 mg/dL    GFR Non-African American >60 >60    GFR African American >60 >60    Calcium 9.4 8.3 - 10.6 mg/dL    Total Protein 6.5 6.4 - 8.2 g/dL    Albumin 3.9 3.4 - 5.0 g/dL    Albumin/Globulin Ratio 1.5 1.1 - 2.2    Total Bilirubin 0.6 0.0 - 1.0 mg/dL    Alkaline Phosphatase 162 (H) 40 - 129 U/L    ALT 51 (H) 10 - 40 U/L    AST 98 (H) 15 - 37 U/L    Globulin 2.6 g/dL   Troponin   Result Value Ref Range    Troponin 0.20 (H) <0.01 ng/mL   Protime-INR   Result Value Ref Range    Protime 15.0 (H) 10.0 - 13.2 sec    INR 1.29 (H) 0.86 - 1.14   POCT Glucose   Result Value Ref Range    Glucose 210 mg/dL    QC OK? yes    POCT Glucose   Result Value Ref Range    POC Glucose 210 (H) 70 - 99 mg/dl    Performed on ACCU-CHEK    EKG 12 Lead   Result Value Ref Range    Ventricular Rate 83 BPM    Atrial Rate 83 BPM    P-R Interval 178 ms    QRS Duration 182 ms    Q-T Interval 478 ms    QTc Calculation (Bazett) 561 ms    P Axis 36 degrees    R Axis -27 degrees    T Axis 167 degrees    Diagnosis       Normal sinus rhythmLeft bundle branch blockAbnormal ECGWhen compared with ECG of 06-MAR-2021 08:18,Premature atrial complexes are no longer Present       EKG Interpretation  Normal sinus rhythm with a rate of 83. QTc 561. Left bundle branch block. RADIOLOGY    XR CHEST PORTABLE   Final Result   Moderate but stable cardiomegaly. Suspect basilar atelectasis, both findings accentuated by low lung volume. CTA HEAD NECK W CONTRAST   Preliminary Result   Mild atherosclerotic changes in the thoracic aorta. Mild atherosclerotic   disease at the origin of the left vertebral artery with approximately 50%   stenosis of the origin of the left vert. No other significant   atherosclerotic disease or narrowing of the internal carotid arteries or   intracranial circulation. CT HEAD WO CONTRAST   Final Result   No acute intracranial abnormality. No significant change from prior study      Findings were discussed with Riely LOPEZ at 12:28 pm on 4/16/2021. I discussed the CT imaging results with the radiologist,  at [id number] Sebastian:  Last Known Well:  1200  Arrival to ED: 1234  Stroke Team: Case discussed with  Stroke Team, at [phone].     Reason for Delays:  [] Social/Evangelical  [] Initial refusal of testing or treatment  [] Care team unable to identify eligibility  [] Hypertension requiring aggressive control with IV medications  [] Further diagnostic evaluation to confirm stroke for patients with hypoglycemia (blood glucose <50), seizures, or major metabolic disorders  [] Management of concomitant emergent/acute conditions such as cardiopulmonary arrest, respiratory failure (requiring intubation)  [] Investigational or experimental protocol for thrombolysis    ED COURSE/MDM  Patient seen and evaluated. Old records reviewed. Labs and imaging reviewed and results discussed. Patient was stable throughout his stay. Family was not present upon arrival or during time of episode. It was initially unclear if the facial asymmetry was new versus old. (Family later was at bedside and confirmed that this appeared to be new to them.)  I considered orthostatic hypotension, dehydration, cardiogenic arrhythmia, intracranial hemorrhage, CVA. I spoke with the hospitalist. We thoroughly discussed the history, physical exam, laboratory and imaging studies, as well as, emergency department course. Based upon that discussion, we've decided to admit Shailesh Wilson for further observation and evaluation of Kenyatta Geronimo CVA-like symptoms. As I have deemed necessary from their history, physical and studies, I have considered and evaluated Shailesh Wilson for the following diagnoses:  DIABETES, INTRACRANIAL HEMORRHAGE, MENINGITIS, SUBARACHNOID HEMORRHAGE, SUBDURAL HEMATOMA, & STROKE.     t-PA NOT given due to the following EXCLUSION CRITERIA (only those checked):  [] Pregnancy  [] Symptoms > 3 hours of onset  [x] Minor or isolated neurological signs  [x] Non-disabling stroke  [] Seizure at the same time of stroke symptoms  [] Active bleeding or acute trauma (fracture)  [] Presentation consistent with acute MI or post-MI pericarditis  [] Known intracranial neoplasm, AV malformation or aneurysm  [] CT evidence of intracranial hemorrhage  [] Any prior history of intracranial hemorrhage  [] Symptoms suggestive of subarachnoid hemorrhage (even if head CT normal)  [] Persistent hypertension (SBP>185 or DBP>110)  [] Glucose < 50 or > 400. [] Bleeding diathesis, including but not limited to:   -Platelets < 529,760   -Heparin within 48 hours with PTT > normal range   -Current or recent use of anticoagulants (dabagtran, rivaroxaban, or warfarin with     INR > 1.7)  [] Lumbar puncture in past 7 days  [] Arterial puncture at a noncompressible site in past 7 days  [] Major surgery in past 14 days  [] Gastrointestinal or urinary tract hemorrhage in past 21 days  [] Myocardial infarction in past 3 months  [] Stroke, intracranial surgery or serious head trauma in past 3 months                  CLINICAL IMPRESSION  1. Near syncope    2. Elevated troponin    3. Facial droop    4. Non-intractable vomiting with nausea, unspecified vomiting type        Blood pressure 96/67, pulse 89, temperature 97.5 °F (36.4 °C), resp. rate 26, height 6' 2\" (1.88 m), weight 146 lb 12.8 oz (66.6 kg), SpO2 95 %. DISPOSITION  Michel Gallego was admitted in stable condition. CRITICAL CARE TIME:  Total critical care time today provided was 30 minutes. This excludes seperately billable procedures and family discussion time. Provided for multiple rechecks and evaluation/discussion with family for potential intervention with concern for potential decompensation. Jacki Urena DO    Comment: Please note this report has been produced using speech recognition software and may contain errors related to that system including errors in grammar, punctuation, and spelling, as well as words and phrases that may be inappropriate. If there are any questions or concerns please feel free to contact the dictating provider for clarification.        Jacki Urena DO  04/16/21 [address] [place] [address], DO  04/16/21 [id number]

## 2021-04-16 NOTE — DISCHARGE SUMMARY
Hospital Medicine Discharge Summary    Patient ID: Lawerncjuan Helms      Patient's PCP: Ahmed Alpers, APRN - CNP    Admitting Physician: Sukhdev Ward MD  Discharge Physician: Sukhdev Ward MD     Admit Date: 2021     Disposition:     Discharge Diagnoses: Active Hospital Problems    Diagnosis    Weakness [R53.1]       Date of Death:21  Time of Death:1708 PM    Immediate Cause of Death: Respiratory failure  Underlying Conditions:severe cardiomyopathy, dementia, parkinsons  Other Contributing Conditions: HLD    Hospital Course:   History Of Present Illness:     80 y.o. male with parkinsons, dementia, cardiomyopathy, pacemaker who presented to [facility] with near syncope. Pt was at home working with pt/ot. He was also with his caregiver and walking into the living room with his walker when he slumped forward on it, but remained standing. He was brought to the chair and remained minimally responsive. EMS was called. He had episode of emesis in ambulance. There was also some concern for left facial droop but difficult to determine.     Pt at baseline is alert and talking and moves(with walker) w/o issues.     ER course: CT/CTA obtained, stroke team was called, voice is not sharp per daughter,           Near syncope/weakness- unclear etiology, concern for cva vs tia, pt does have hx of cardiomyopathy, CT/CTA were w/o acute pathology, orthostatics in ER were unremarkable  -tele  -supportive care  -will need to consider interrogating pacer  -pt unable to get MRI per daughter   -pt suddenly and rapidly decompensated in the afternoon, daughter was at bedside, pt was noted to have agonal breathing. Discussed case with daughter and given pt was DNR/DNI, decision was made for comfort measures. Pt passed away at above time.     Elevated troponin -chronic, appears stable and similar to recent admission, pt w/o complaints of chest pain  -tele  -will need to trend out     Dementia/parkinsons-defer to outpt mgmt  -resume meds in AM     HLD- defer to outpt mgmt    Consults:  IP CONSULT TO PHARMACY  PHARMACY TO CHANGE BASE FLUIDS  IP CONSULT TO HOSPITALIST    Signed:  Mickey Buck MD   4/18/2021    Thank you TEMITOPE Funk - CATARINA for the opportunity to be involved in this patient's care. If you have any questions or concerns please feel free to contact me at [phone].

## 2021-04-16 NOTE — ED NOTES
Called  Stroke Team @ [phone]  Re: CVA  Dr Zachary Dodson responded @ [facility]  04/16/21 [phone] [id number]

## 2021-04-16 NOTE — PROGRESS NOTES
Admitted to room 351 with family at bedside. Pt oriented to self and situation only, and forgetful. Tele box #35 in place, pt aware of placement and reason.

## 2021-04-16 NOTE — H&P
Hospital Medicine History & Physical      PCP: TEMITOPE Valadez CNP    Date of Admission: 4/16/2021    Date of Service: Pt seen/examined on 4/16/21 and Admitted to Inpatient with expected LOS greater than two midnights due to medical therapy. Chief Complaint:  Near syncope      History Of Present Illness:      80 y.o. male with parkinsons, dementia, cardiomyopathy, pacemaker who presented to [facility] with near syncope. Pt was at home working with pt/ot. He was also with his caregiver and walking into the living room with his walker when he slumped forward on it, but remained standing. He was brought to the chair and remained minimally responsive. EMS was called. He had episode of emesis in ambulance. There was also some concern for left facial droop but difficult to determine. Pt at baseline is alert and talking and moves(with walker) w/o issues. 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  Authorizing Provider   metFORMIN (GLUCOPHAGE) 500 MG tablet 2 tablets twice daily with meals, BLOOD SUGARS 4/5/21  Yes TEMITOPE Green - CNP   donepezil (ARICEPT) 10 MG tablet TAKE 1 TABLET BY MOUTH NIGHTLY FOR  MEMORY 4/5/21  Yes TEMITOPE Green - CNP   pravastatin (PRAVACHOL) 40 MG tablet Take 1 tablet by mouth once daily 1/20/21  Yes TEMITOPE Green CNP   lisinopril (PRINIVIL;ZESTRIL) 2.5 MG tablet Take 1 tablet by mouth once daily 1/20/21  Yes TEMITOPE Foy CNP   clopidogrel (PLAVIX) 75 MG tablet Take 1 tablet by mouth once daily 1/20/21  Yes TEMITOPE Foy CNP   potassium chloride (KLOR-CON) 20 MEQ packet Take 20 mEq by mouth daily 1/13/21  Yes TEMITOPE Foy CNP   furosemide (LASIX) 20 MG tablet Take 20 mg by mouth daily 9/28/20  Yes Historical Provider, MD   tamsulosin (FLOMAX) 0.4 MG capsule Take 1 capsule by mouth once daily 7/1/20  Yes TEMITOPE Foy CNP   aspirin 81 MG chewable tablet Take 1 tablet by mouth daily 5/1/20  Yes JD Lux   metoprolol succinate (TOPROL XL) 25 MG extended release tablet Take 0.5 tablets by mouth daily 5/1/20  Yes JD Lux   carbidopa-levodopa (SINEMET)  MG per tablet Take 1.5 tablets by mouth 3 times daily  5/31/17  Yes Historical Provider, MD   therapeutic multivitamin-minerals (THERAGRAN-M) tablet Take 1 tablet by mouth daily. Yes Historical Provider, MD       Allergies:  Pcn [penicillins]    Social History:      The patient currently lives at home    TOBACCO:   reports that he quit smoking about 65 years ago. His smoking use included cigarettes, pipe, and cigars. He smoked 0.50 packs per day. He has never used smokeless tobacco.  ETOH:   reports previous alcohol use. E-Cigarettes/Vaping Use     Questions Responses    E-Cigarette/Vaping Use Never User    Start Date     Passive Exposure     Quit Date     Counseling Given     Comments             Family History:       Reviewed in detail and negative for DM, CAD, Cancer, CVA. Positive as follows:        Problem Relation Age of Onset    Cancer Mother     Diabetes Mother     Alzheimer's Disease Father        REVIEW OF SYSTEMS:   Pertinent positives as noted in the HPI. All other systems reviewed and negative.     PHYSICAL EXAM PERFORMED:    BP (!) 62/36   Pulse 96 Temp 97.4 °F (36.3 °C) (Oral)   Resp 16   Ht 6' 1\" (1.854 m)   Wt 140 lb 6.9 oz (63.7 kg)   SpO2 98%   BMI 18.53 kg/m²     General appearance:  No apparent distress, appears stated age and cooperative. Frail appearing, tremors noted  HEENT:  Normal cephalic, atraumatic without obvious deformity. Pupils equal, round, and reactive to light. Extra ocular muscles intact. Conjunctivae/corneas clear. Neck: Supple, with full range of motion. No jugular venous distention. Trachea midline. Respiratory:  Normal respiratory effort. Clear to auscultation, bilaterally without Rales/Wheezes/Rhonchi. Cardiovascular:  Regular rate and rhythm with normal S1/S2 without murmurs, rubs or gallops. Abdomen: Soft, non-tender, non-distended with normal bowel sounds. Musculoskeletal:  No clubbing, cyanosis or edema bilaterally. Full range of motion without deformity. Skin: Skin color, texture, turgor normal.  No rashes or lesions. Neurologic:  Neurovascularly intact without any focal sensory/motor deficits.  Cranial nerves: II-XII intact, grossly non-focal.  Psychiatric:  Alert and oriented x 1, thought content not appropriate, not normal insight  Capillary Refill: Brisk,< 3 seconds   Peripheral Pulses: +2 palpable, equal bilaterally       Labs:     Recent Labs     04/16/21  1240   WBC 4.2   HGB 12.3*   HCT 36.5*        Recent Labs     04/16/21  1240      K 4.3   CL 96*   CO2 21   BUN 46*   CREATININE 1.0   CALCIUM 9.4     Recent Labs     04/16/21  1240   AST 98*   ALT 51*   BILITOT 0.6   ALKPHOS 162*     Recent Labs     04/16/21  1240   INR 1.29*     Recent Labs     04/16/21  1240   TROPONINI 0.20*       Urinalysis:      Lab Results   Component Value Date    NITRU Negative 03/05/2021    WBCUA 0-2 03/05/2021    RBCUA >100 03/05/2021    BLOODU LARGE 03/05/2021    SPECGRAV 1.015 03/05/2021    GLUCOSEU 100 03/05/2021       Radiology:     EKG:  I have reviewed the EKG with the following interpretation: nsr, 83, left axis, LBBB    XR CHEST PORTABLE   Final Result   Moderate but stable cardiomegaly. Suspect basilar atelectasis, both findings accentuated by low lung volume. CTA HEAD NECK W CONTRAST   Preliminary Result   Mild atherosclerotic changes in the thoracic aorta. Mild atherosclerotic   disease at the origin of the left vertebral artery with approximately 50%   stenosis of the origin of the left vertebral artery. No other significant   atherosclerotic disease or narrowing of the internal carotid arteries or   intracranial circulation. CT HEAD WO CONTRAST   Final Result   No acute intracranial abnormality. No significant change from prior study      Findings were discussed with Hansel LOPEZ at 12:28 pm on 4/16/2021. ASSESSMENT:    Active Hospital Problems    Diagnosis Date Noted    Weakness [R53.1] 04/16/2021         PLAN:    Near syncope/weakness- unclear etiology, concern for cva vs tia, pt does have hx of cardiomyopathy, CT/CTA were w/o acute pathology, orthostatics in ER were unremarkable  -tele  -supportive care  -will need to consider interrogating pacer  -pt unable to get MRI per daughter    Elevated troponin -chronic, appears stable and similar to recent admission, pt w/o complaints of chest pain  -tele  -will need to trend out    Dementia/parkinsons-defer to outpt mgmt  -resume meds in AM    HLD- defer to outpt mgmt    DVT Prophylaxis: lovenox  Diet: Diet NPO Effective Now  Code Status: Limited(Discussed at length with daughter)    PT/OT Eval Status: not ordered yet    Dispo - pending workup       Slime Knapp MD    Thank you TEMITOPE Morgan - CATARINA for the opportunity to be involved in this patient's care. If you have any questions or concerns please feel free to contact me at [phone].

## 2021-05-15 NOTE — PROGRESS NOTES
Physician Progress Note      PATIENT:               Francia Palomo  CSN #:                  [id number]  :                       1935  ADMIT DATE:       2021 12:12 PM  [address] [place] DATE:        2021 10:38 PM  RESPONDING  PROVIDER #:        Kristina Zavala MD          QUERY TEXT:    Pt admitted with weakness, had sudden decline and . Pt noted to have   \"near syncope/weakness - unclear etiology, concern for CVA vs TIA\" in D[facility]. If   possible, please document in progress notes and discharge summary if you are   evaluating and/or treating the following: The medical record reflects the following:  Risk Factors: h/o cardiomyopathy, HLD, Parkinsons  Clinical Indicators: CT/CTA and orthostatics unremarkable, pt unable to get   MRI, \"pt suddenly and rapidly decompensated in the afternoon, daughter was at   bedside, pt was noted to have agonal breathing. \" H&P \"Pt was at home working   with pt/ot. He was also with his caregiver and walking into the living room   with his walker when he slumped forward on it, but remained standing. He was   brought to the chair and remained minimally responsive. EMS was called. He   had episode of emesis in ambulance. There was also some concern for left   facial droop but difficult to determine. \"  Treatment: stroke team called, imaging/labs, telemetry, orthostatics,   monitoring and supportive care - pt had rapid decline, respiratory failure and       Thank you  Shaila Mora RN, CCDS, CRCR  [email or web address]  Options provided:  -- Weakness d/t possible CVA  -- Weakness likely d/t (please specify), (please specify). -- Other - I will add my own diagnosis  -- Disagree - Not applicable / Not valid  -- Disagree - Clinically unable to determine / Unknown  -- Refer to Clinical Documentation Reviewer    PROVIDER RESPONSE TEXT:    Provider is clinically unable to determine a response to this query.     Query created by: Priscilla Lange on 2021 8:53 AM      Electronically